# Patient Record
Sex: FEMALE | Race: WHITE | Employment: OTHER | URBAN - NONMETROPOLITAN AREA
[De-identification: names, ages, dates, MRNs, and addresses within clinical notes are randomized per-mention and may not be internally consistent; named-entity substitution may affect disease eponyms.]

---

## 2017-08-02 ENCOUNTER — HOSPITAL ENCOUNTER (OUTPATIENT)
Dept: CT IMAGING | Age: 67
Discharge: HOME | End: 2017-08-02
Payer: MEDICARE

## 2017-08-02 DIAGNOSIS — R10.32 LEFT LOWER QUADRANT PAIN: ICD-10-CM

## 2017-08-02 LAB
ALT SERPL-CCNC: 12 U/L (ref 11–66)
AMORPHOUS: ABNORMAL
ANION GAP SERPL CALCULATED.3IONS-SCNC: 11 MEQ/L (ref 8–16)
ANISOCYTOSIS: ABNORMAL
AST SERPL-CCNC: 12 U/L (ref 5–40)
BACTERIA: ABNORMAL
BASOPHILS # BLD: 0.7 %
BASOPHILS ABSOLUTE: 0 THOU/MM3 (ref 0–0.1)
BILIRUBIN URINE: NEGATIVE
BLOOD, URINE: NEGATIVE
BUN BLDV-MCNC: 15 MG/DL (ref 7–22)
CALCIUM SERPL-MCNC: 9.1 MG/DL (ref 8.5–10.5)
CASTS: ABNORMAL /LPF
CASTS: ABNORMAL /LPF
CHARACTER, URINE: ABNORMAL
CHLORIDE BLD-SCNC: 102 MEQ/L (ref 98–111)
CHOLESTEROL, TOTAL: 164 MG/DL (ref 100–199)
CO2: 28 MEQ/L (ref 23–33)
COLOR: ABNORMAL
CREAT SERPL-MCNC: 0.8 MG/DL (ref 0.4–1.2)
CRYSTALS: ABNORMAL
EOSINOPHIL # BLD: 2.5 %
EOSINOPHILS ABSOLUTE: 0.2 THOU/MM3 (ref 0–0.4)
EPITHELIAL CELLS, UA: ABNORMAL /HPF
GFR SERPL CREATININE-BSD FRML MDRD: 71 ML/MIN/1.73M2
GLUCOSE BLD-MCNC: 133 MG/DL (ref 70–108)
GLUCOSE, URINE: NEGATIVE MG/DL
HCT VFR BLD CALC: 40.1 % (ref 37–47)
HDLC SERPL-MCNC: 39 MG/DL
HEMOGLOBIN: 13.4 GM/DL (ref 12–16)
KETONES, URINE: NEGATIVE
LDL CHOLESTEROL CALCULATED: 78 MG/DL
LEUKOCYTE EST, POC: NEGATIVE
LYMPHOCYTES # BLD: 29.6 %
LYMPHOCYTES ABSOLUTE: 2 THOU/MM3 (ref 1–4.8)
MCH RBC QN AUTO: 27.8 PG (ref 27–31)
MCHC RBC AUTO-ENTMCNC: 33.3 GM/DL (ref 33–37)
MCV RBC AUTO: 83.4 FL (ref 81–99)
MISCELLANEOUS LAB TEST RESULT: ABNORMAL
MONOCYTES # BLD: 7.4 %
MONOCYTES ABSOLUTE: 0.5 THOU/MM3 (ref 0.4–1.3)
MUCUS: ABNORMAL
NITRITE, URINE: NEGATIVE
NUCLEATED RED BLOOD CELLS: 0 /100 WBC
PDW BLD-RTO: 15.1 % (ref 11.5–14.5)
PH UA: 5.5
PLATELET # BLD: 239 THOU/MM3 (ref 130–400)
PMV BLD AUTO: 8.7 MCM (ref 7.4–10.4)
POC CREATININE WHOLE BLOOD: 0.9 MG/DL (ref 0.5–1.2)
POTASSIUM SERPL-SCNC: 4.2 MEQ/L (ref 3.5–5.2)
PROTEIN UA: NEGATIVE MG/DL
RBC # BLD: 4.81 MILL/MM3 (ref 4.2–5.4)
RBC # BLD: NORMAL 10*6/UL
RBC URINE: ABNORMAL /HPF
RENAL EPITHELIAL, UA: ABNORMAL
SEG NEUTROPHILS: 59.8 %
SEGMENTED NEUTROPHILS ABSOLUTE COUNT: 4.1 THOU/MM3 (ref 1.8–7.7)
SODIUM BLD-SCNC: 141 MEQ/L (ref 135–145)
SPECIFIC GRAVITY UA: 1.02 (ref 1–1.03)
TRIGL SERPL-MCNC: 235 MG/DL (ref 0–199)
UROBILINOGEN, URINE: 0.2 EU/DL
WBC # BLD: 6.8 THOU/MM3 (ref 4.8–10.8)
WBC UA: ABNORMAL /HPF
YEAST: ABNORMAL

## 2017-08-02 PROCEDURE — 36415 COLL VENOUS BLD VENIPUNCTURE: CPT

## 2017-08-02 PROCEDURE — 6360000004 HC RX CONTRAST MEDICATION: Performed by: FAMILY MEDICINE

## 2017-08-02 PROCEDURE — 80061 LIPID PANEL: CPT

## 2017-08-02 PROCEDURE — 82565 ASSAY OF CREATININE: CPT

## 2017-08-02 PROCEDURE — 85025 COMPLETE CBC W/AUTO DIFF WBC: CPT

## 2017-08-02 PROCEDURE — 84450 TRANSFERASE (AST) (SGOT): CPT

## 2017-08-02 PROCEDURE — 74177 CT ABD & PELVIS W/CONTRAST: CPT

## 2017-08-02 PROCEDURE — 80048 BASIC METABOLIC PNL TOTAL CA: CPT

## 2017-08-02 PROCEDURE — 84460 ALANINE AMINO (ALT) (SGPT): CPT

## 2017-08-02 PROCEDURE — 81001 URINALYSIS AUTO W/SCOPE: CPT

## 2017-08-07 ENCOUNTER — HOSPITAL ENCOUNTER (OUTPATIENT)
Dept: GENERAL RADIOLOGY | Age: 67
Discharge: HOME | End: 2017-08-07
Payer: MEDICARE

## 2017-08-07 ENCOUNTER — HOSPITAL ENCOUNTER (OUTPATIENT)
Age: 67
Discharge: HOME | End: 2017-08-07
Payer: MEDICARE

## 2017-08-07 DIAGNOSIS — R06.02 SOB (SHORTNESS OF BREATH): ICD-10-CM

## 2017-08-07 PROCEDURE — 71020 XR CHEST STANDARD TWO VW: CPT

## 2017-08-08 ENCOUNTER — TELEPHONE (OUTPATIENT)
Dept: CARDIOLOGY CLINIC | Age: 67
End: 2017-08-08

## 2017-08-10 ENCOUNTER — HOSPITAL ENCOUNTER (OUTPATIENT)
Dept: NON INVASIVE DIAGNOSTICS | Age: 67
Discharge: HOME | End: 2017-08-10
Payer: MEDICARE

## 2017-08-10 LAB
EKG ATRIAL RATE: 67 BPM
EKG P AXIS: 57 DEGREES
EKG P-R INTERVAL: 142 MS
EKG Q-T INTERVAL: 450 MS
EKG QRS DURATION: 142 MS
EKG QTC CALCULATION (BAZETT): 475 MS
EKG R AXIS: -20 DEGREES
EKG T AXIS: 23 DEGREES
EKG VENTRICULAR RATE: 67 BPM
LV EF: 60 %
LVEF MODALITY: NORMAL

## 2017-08-10 PROCEDURE — 93005 ELECTROCARDIOGRAM TRACING: CPT

## 2017-08-10 PROCEDURE — 93306 TTE W/DOPPLER COMPLETE: CPT

## 2017-08-18 ENCOUNTER — OFFICE VISIT (OUTPATIENT)
Dept: CARDIOLOGY CLINIC | Age: 67
End: 2017-08-18
Payer: MEDICARE

## 2017-08-18 VITALS
HEART RATE: 108 BPM | WEIGHT: 293 LBS | DIASTOLIC BLOOD PRESSURE: 90 MMHG | BODY MASS INDEX: 43.4 KG/M2 | HEIGHT: 69 IN | SYSTOLIC BLOOD PRESSURE: 156 MMHG

## 2017-08-18 DIAGNOSIS — I10 ESSENTIAL HYPERTENSION: ICD-10-CM

## 2017-08-18 DIAGNOSIS — R10.9 FLANK PAIN: ICD-10-CM

## 2017-08-18 DIAGNOSIS — E78.01 FAMILIAL HYPERCHOLESTEROLEMIA: ICD-10-CM

## 2017-08-18 DIAGNOSIS — R06.09 DOE (DYSPNEA ON EXERTION): Primary | ICD-10-CM

## 2017-08-18 PROCEDURE — 93000 ELECTROCARDIOGRAM COMPLETE: CPT | Performed by: NUCLEAR MEDICINE

## 2017-08-18 PROCEDURE — 99204 OFFICE O/P NEW MOD 45 MIN: CPT | Performed by: NUCLEAR MEDICINE

## 2017-08-18 ASSESSMENT — ENCOUNTER SYMPTOMS
CHEST TIGHTNESS: 0
BLOOD IN STOOL: 0
DIARRHEA: 0
ANAL BLEEDING: 0
ABDOMINAL DISTENTION: 0
PHOTOPHOBIA: 0
ABDOMINAL PAIN: 0
BACK PAIN: 0
CONSTIPATION: 0
SHORTNESS OF BREATH: 1
RECTAL PAIN: 0
VOMITING: 0
NAUSEA: 0
COLOR CHANGE: 0

## 2017-09-08 ENCOUNTER — HOSPITAL ENCOUNTER (OUTPATIENT)
Dept: PULMONOLOGY | Age: 67
Discharge: HOME | End: 2017-09-08
Payer: MEDICARE

## 2017-09-08 PROCEDURE — 94060 EVALUATION OF WHEEZING: CPT

## 2017-09-08 PROCEDURE — 94729 DIFFUSING CAPACITY: CPT

## 2017-09-08 PROCEDURE — 94726 PLETHYSMOGRAPHY LUNG VOLUMES: CPT

## 2017-12-08 ENCOUNTER — TELEPHONE (OUTPATIENT)
Dept: CARDIOLOGY CLINIC | Age: 67
End: 2017-12-08

## 2017-12-20 ENCOUNTER — OFFICE VISIT (OUTPATIENT)
Dept: CARDIOLOGY CLINIC | Age: 67
End: 2017-12-20
Payer: MEDICARE

## 2017-12-20 ENCOUNTER — HOSPITAL ENCOUNTER (OUTPATIENT)
Dept: NON INVASIVE DIAGNOSTICS | Age: 67
Discharge: HOME | End: 2017-12-20
Payer: MEDICARE

## 2017-12-20 VITALS
DIASTOLIC BLOOD PRESSURE: 67 MMHG | HEART RATE: 103 BPM | BODY MASS INDEX: 44.41 KG/M2 | HEIGHT: 68 IN | SYSTOLIC BLOOD PRESSURE: 124 MMHG | WEIGHT: 293 LBS

## 2017-12-20 DIAGNOSIS — I10 ESSENTIAL HYPERTENSION: ICD-10-CM

## 2017-12-20 DIAGNOSIS — R42 DIZZINESS: ICD-10-CM

## 2017-12-20 DIAGNOSIS — R06.09 DOE (DYSPNEA ON EXERTION): Primary | ICD-10-CM

## 2017-12-20 DIAGNOSIS — E78.5 HYPERLIPIDEMIA, UNSPECIFIED HYPERLIPIDEMIA TYPE: ICD-10-CM

## 2017-12-20 DIAGNOSIS — R06.09 DOE (DYSPNEA ON EXERTION): ICD-10-CM

## 2017-12-20 PROCEDURE — 99214 OFFICE O/P EST MOD 30 MIN: CPT | Performed by: NUCLEAR MEDICINE

## 2017-12-20 PROCEDURE — 93308 TTE F-UP OR LMTD: CPT

## 2017-12-20 PROCEDURE — 93000 ELECTROCARDIOGRAM COMPLETE: CPT | Performed by: NUCLEAR MEDICINE

## 2017-12-20 RX ORDER — NIACIN 250 MG
250 TABLET, EXTENDED RELEASE ORAL NIGHTLY
COMMUNITY
End: 2018-06-01 | Stop reason: ALTCHOICE

## 2017-12-20 NOTE — PROGRESS NOTES
[facility]  [facility]  [address]  [address]  [facility] [place] [address]  Dept: [phone]  Dept Fax: [phone]  Loc: [phone]    Visit Date: 12/20/2017    Clearance Doing is a 79 y.o. female who presents today for:  Chief Complaint   Patient presents with    Check-Up     dizziness    Dizziness    Hypertension    Obesity     Here due to dizziness  Seems orthostatic   Had orthostatic hypotension at PCP  Drop BP by 50 points  Ended up with compression hose  No difference  Mainly orthostatic  No chest pain  Some dyspnea  No syncope  Cath 2014  Known small pericardiac effusion that we are following   Obesity issues     HPI:  HPI  Past Medical History:   Diagnosis Date    Cancer ([address]     skin    SCOTT (dyspnea on exertion)     false positive stress test 2014 with normal cardiac cath 2014.     GERD (gastroesophageal reflux disease)     Headache     Hyperglycemia 2015    Hyperlipidemia     Migraine headache     Morbid obesity (HCC)     Osteopenia     RLS (restless legs syndrome)     UTI (urinary tract infection)     history of      Past Surgical History:   Procedure Laterality Date   Laci Area  3181'Y    CARPAL TUNNEL RELEASE Bilateral 1990 2008 1991 2016     x2 right x2 left    HIP ARTHROPLASTY Left 02/2015    HYSTERECTOMY  1980    KNEE ARTHROPLASTY Left 2007    KNEE ARTHROPLASTY Right 2014     Family History   Problem Relation Age of Onset    Arthritis Mother     High Blood Pressure Mother     Cancer Sister      ovarian    Cancer Maternal Grandmother      breast    Arthritis Maternal Grandmother     Cancer Maternal Grandfather      colon    Arthritis Maternal Grandfather      Social History   Substance Use Topics    Smoking status: Never Smoker    Smokeless tobacco: Never Used    Alcohol use No      Current Outpatient Prescriptions   Medication Sig Dispense Refill    niacin (SLO-NIACIN) 250 MG extended release tablet Take 250 mg by mouth nightly  verapamil (CALAN SR) 180 MG extended release tablet Take 180 mg by mouth daily      metFORMIN (GLUCOPHAGE) 500 MG tablet Take 500 mg by mouth daily (with breakfast)      DULoxetine (CYMBALTA) 30 MG extended release capsule Take 30 mg by mouth daily      SUMAtriptan (IMITREX) 50 MG tablet Take 50 mg by mouth once as needed for Migraine      naproxen (NAPROSYN) 500 MG tablet Take 500 mg by mouth as needed for Pain      alendronate (FOSAMAX) 35 MG tablet Take 35 mg by mouth every 7 days       atorvastatin (LIPITOR) 10 MG tablet Take 10 mg by mouth daily.  rOPINIRole (REQUIP) 0.5 MG tablet Take 0.5 mg by mouth nightly.  omeprazole (PRILOSEC) 20 MG capsule Take 20 mg by mouth daily. No current facility-administered medications for this visit. No Known Allergies  Health Maintenance   Topic Date Due    Hepatitis C screen  1950    DTaP/Tdap/Td vaccine (1 - Tdap) 03/25/1969    Colon cancer screen colonoscopy  03/25/2000    Zostavax vaccine  03/25/2010    DEXA (modify frequency per FRAX score)  03/25/2015    Pneumococcal low/med risk (1 of 2 - PCV13) 03/25/2015    Flu vaccine (1) 09/01/2017    Diabetes screen  09/11/2018    Breast cancer screen  01/09/2019    Lipid screen  08/02/2022       Subjective:  Review of Systems  General:   No fever, no chills, No fatigue or weight loss  Pulmonary:    some dyspnea, no wheezing  Cardiac:    Denies recent chest pain,   GI:     No nausea or vomiting, no abdominal pain  Neuro:     some dizziness or light headedness,   Musculoskeletal:  No recent active issues  Extremities:   No edema, good peripheral pulses      Objective:  Physical Exam  /67 (Position: Standing)   Pulse 103   Ht 5' 7.5\" (1.715 m)   Wt 297 lb (134.7 kg)   BMI 45.83 kg/m²   General:   Well developed, well nourished  Lungs:    Clear to auscultation  Heart:    Normal S1 S2, Slight murmur.  no rubs, no gallops  Abdomen:   Soft, non tender, no organomegalies, positive

## 2017-12-20 NOTE — PROGRESS NOTES
Pt here for check up   States she has had issues with dizziness, more when going from laying to standing   Had positive orthostatic BP at PCP office   Also has been having some SOB more on exertion    Denies chest pain

## 2018-01-29 ENCOUNTER — HOSPITAL ENCOUNTER (OUTPATIENT)
Dept: WOMENS IMAGING | Age: 68
Discharge: HOME | End: 2018-01-29
Payer: MEDICARE

## 2018-01-29 DIAGNOSIS — Z12.31 VISIT FOR SCREENING MAMMOGRAM: ICD-10-CM

## 2018-01-29 PROCEDURE — 77067 SCR MAMMO BI INCL CAD: CPT

## 2018-01-29 PROCEDURE — G0202 SCR MAMMO BI INCL CAD: HCPCS

## 2018-04-18 ENCOUNTER — OFFICE VISIT (OUTPATIENT)
Dept: CARDIOLOGY CLINIC | Age: 68
End: 2018-04-18
Payer: MEDICARE

## 2018-04-18 VITALS
DIASTOLIC BLOOD PRESSURE: 65 MMHG | WEIGHT: 293 LBS | BODY MASS INDEX: 44.41 KG/M2 | HEART RATE: 98 BPM | SYSTOLIC BLOOD PRESSURE: 103 MMHG | HEIGHT: 68 IN

## 2018-04-18 DIAGNOSIS — R06.09 DOE (DYSPNEA ON EXERTION): Primary | ICD-10-CM

## 2018-04-18 DIAGNOSIS — E78.5 HYPERLIPIDEMIA, UNSPECIFIED HYPERLIPIDEMIA TYPE: ICD-10-CM

## 2018-04-18 DIAGNOSIS — I25.10 CORONARY ARTERY DISEASE INVOLVING NATIVE CORONARY ARTERY OF NATIVE HEART WITHOUT ANGINA PECTORIS: ICD-10-CM

## 2018-04-18 DIAGNOSIS — E78.01 FAMILIAL HYPERCHOLESTEROLEMIA: ICD-10-CM

## 2018-04-18 PROCEDURE — 99214 OFFICE O/P EST MOD 30 MIN: CPT | Performed by: NUCLEAR MEDICINE

## 2018-04-18 PROCEDURE — 93000 ELECTROCARDIOGRAM COMPLETE: CPT | Performed by: NUCLEAR MEDICINE

## 2018-04-18 RX ORDER — ATENOLOL 25 MG/1
12.5 TABLET ORAL DAILY
Qty: 45 TABLET | Refills: 3 | Status: SHIPPED | OUTPATIENT
Start: 2018-04-18 | End: 2019-04-26 | Stop reason: SDUPTHER

## 2018-04-18 RX ORDER — ATENOLOL 25 MG/1
12.5 TABLET ORAL DAILY
COMMUNITY
End: 2018-04-18 | Stop reason: SDUPTHER

## 2018-04-23 ENCOUNTER — HOSPITAL ENCOUNTER (OUTPATIENT)
Age: 68
Discharge: HOME | End: 2018-04-23
Payer: MEDICARE

## 2018-04-23 LAB
AVERAGE GLUCOSE: 117 MG/DL (ref 70–126)
BUN BLDV-MCNC: 13 MG/DL (ref 7–22)
CREAT SERPL-MCNC: 0.8 MG/DL (ref 0.4–1.2)
GFR SERPL CREATININE-BSD FRML MDRD: 71 ML/MIN/1.73M2
HBA1C MFR BLD: 5.9 % (ref 4.4–6.4)

## 2018-04-23 PROCEDURE — 83036 HEMOGLOBIN GLYCOSYLATED A1C: CPT

## 2018-04-23 PROCEDURE — 82565 ASSAY OF CREATININE: CPT

## 2018-04-23 PROCEDURE — 84520 ASSAY OF UREA NITROGEN: CPT

## 2018-04-23 PROCEDURE — 36415 COLL VENOUS BLD VENIPUNCTURE: CPT

## 2018-05-22 ENCOUNTER — HOSPITAL ENCOUNTER (OUTPATIENT)
Dept: NON INVASIVE DIAGNOSTICS | Age: 68
Discharge: HOME | End: 2018-05-22
Payer: MEDICARE

## 2018-05-22 ENCOUNTER — TELEPHONE (OUTPATIENT)
Dept: CARDIOLOGY CLINIC | Age: 68
End: 2018-05-22

## 2018-05-22 VITALS — BODY MASS INDEX: 43.95 KG/M2 | HEIGHT: 68 IN | WEIGHT: 290 LBS

## 2018-05-22 DIAGNOSIS — R06.09 DOE (DYSPNEA ON EXERTION): ICD-10-CM

## 2018-05-22 DIAGNOSIS — E78.5 HYPERLIPIDEMIA, UNSPECIFIED HYPERLIPIDEMIA TYPE: ICD-10-CM

## 2018-05-22 PROCEDURE — 93660 TILT TABLE EVALUATION: CPT | Performed by: NUCLEAR MEDICINE

## 2018-06-01 ENCOUNTER — OFFICE VISIT (OUTPATIENT)
Dept: CARDIOLOGY CLINIC | Age: 68
End: 2018-06-01
Payer: MEDICARE

## 2018-06-01 VITALS
DIASTOLIC BLOOD PRESSURE: 60 MMHG | HEART RATE: 80 BPM | BODY MASS INDEX: 44.41 KG/M2 | HEIGHT: 68 IN | SYSTOLIC BLOOD PRESSURE: 132 MMHG | WEIGHT: 293 LBS

## 2018-06-01 DIAGNOSIS — I25.10 CORONARY ARTERY DISEASE INVOLVING NATIVE CORONARY ARTERY OF NATIVE HEART WITHOUT ANGINA PECTORIS: ICD-10-CM

## 2018-06-01 DIAGNOSIS — I10 ESSENTIAL HYPERTENSION: ICD-10-CM

## 2018-06-01 DIAGNOSIS — E78.01 FAMILIAL HYPERCHOLESTEROLEMIA: ICD-10-CM

## 2018-06-01 DIAGNOSIS — R42 DIZZINESS: Primary | ICD-10-CM

## 2018-06-01 PROCEDURE — 99213 OFFICE O/P EST LOW 20 MIN: CPT | Performed by: NUCLEAR MEDICINE

## 2018-07-19 ENCOUNTER — HOSPITAL ENCOUNTER (OUTPATIENT)
Age: 68
Discharge: HOME | End: 2018-07-19
Payer: MEDICARE

## 2018-07-19 LAB
AVERAGE GLUCOSE: 123 MG/DL (ref 70–126)
HBA1C MFR BLD: 6.1 % (ref 4.4–6.4)

## 2018-07-19 PROCEDURE — 36415 COLL VENOUS BLD VENIPUNCTURE: CPT

## 2018-07-19 PROCEDURE — 83036 HEMOGLOBIN GLYCOSYLATED A1C: CPT

## 2018-08-30 ENCOUNTER — HOSPITAL ENCOUNTER (OUTPATIENT)
Age: 68
Discharge: HOME | End: 2018-08-30
Payer: MEDICARE

## 2018-08-30 LAB
ALBUMIN SERPL-MCNC: 3.9 G/DL (ref 3.5–5.1)
ALP BLD-CCNC: 75 U/L (ref 38–126)
ALT SERPL-CCNC: 8 U/L (ref 11–66)
ANION GAP SERPL CALCULATED.3IONS-SCNC: 12 MEQ/L (ref 8–16)
AST SERPL-CCNC: 11 U/L (ref 5–40)
BASOPHILS # BLD: 0.4 %
BASOPHILS ABSOLUTE: 0 THOU/MM3 (ref 0–0.1)
BILIRUB SERPL-MCNC: 0.6 MG/DL (ref 0.3–1.2)
BUN BLDV-MCNC: 13 MG/DL (ref 7–22)
CALCIUM SERPL-MCNC: 9 MG/DL (ref 8.5–10.5)
CHLORIDE BLD-SCNC: 103 MEQ/L (ref 98–111)
CHOLESTEROL, TOTAL: 174 MG/DL (ref 100–199)
CO2: 23 MEQ/L (ref 23–33)
CREAT SERPL-MCNC: 0.8 MG/DL (ref 0.4–1.2)
EOSINOPHIL # BLD: 2.1 %
EOSINOPHILS ABSOLUTE: 0.1 THOU/MM3 (ref 0–0.4)
ERYTHROCYTE [DISTWIDTH] IN BLOOD BY AUTOMATED COUNT: 13.8 % (ref 11.5–14.5)
ERYTHROCYTE [DISTWIDTH] IN BLOOD BY AUTOMATED COUNT: 42.6 FL (ref 35–45)
GFR SERPL CREATININE-BSD FRML MDRD: 71 ML/MIN/1.73M2
GLUCOSE BLD-MCNC: 151 MG/DL (ref 70–108)
HCT VFR BLD CALC: 40.9 % (ref 37–47)
HDLC SERPL-MCNC: 40 MG/DL
HEMOGLOBIN: 13.6 GM/DL (ref 12–16)
IMMATURE GRANS (ABS): 0.01 THOU/MM3 (ref 0–0.07)
IMMATURE GRANULOCYTES: 0.2 %
LDL CHOLESTEROL CALCULATED: 97 MG/DL
LYMPHOCYTES # BLD: 27.5 %
LYMPHOCYTES ABSOLUTE: 1.6 THOU/MM3 (ref 1–4.8)
MCH RBC QN AUTO: 28.2 PG (ref 26–33)
MCHC RBC AUTO-ENTMCNC: 33.3 GM/DL (ref 32.2–35.5)
MCV RBC AUTO: 84.9 FL (ref 81–99)
MONOCYTES # BLD: 7.6 %
MONOCYTES ABSOLUTE: 0.4 THOU/MM3 (ref 0.4–1.3)
NUCLEATED RED BLOOD CELLS: 0 /100 WBC
PLATELET # BLD: 252 THOU/MM3 (ref 130–400)
PMV BLD AUTO: 9.8 FL (ref 9.4–12.4)
POTASSIUM SERPL-SCNC: 4.3 MEQ/L (ref 3.5–5.2)
RBC # BLD: 4.82 MILL/MM3 (ref 4.2–5.4)
SEG NEUTROPHILS: 62.2 %
SEGMENTED NEUTROPHILS ABSOLUTE COUNT: 3.5 THOU/MM3 (ref 1.8–7.7)
SODIUM BLD-SCNC: 138 MEQ/L (ref 135–145)
T4 FREE: 0.93 NG/DL (ref 0.93–1.76)
TOTAL PROTEIN: 6.9 G/DL (ref 6.1–8)
TRIGL SERPL-MCNC: 183 MG/DL (ref 0–199)
TSH SERPL DL<=0.05 MIU/L-ACNC: 1.84 UIU/ML (ref 0.4–4.2)
WBC # BLD: 5.7 THOU/MM3 (ref 4.8–10.8)

## 2018-08-30 PROCEDURE — 36415 COLL VENOUS BLD VENIPUNCTURE: CPT

## 2018-08-30 PROCEDURE — 84439 ASSAY OF FREE THYROXINE: CPT

## 2018-08-30 PROCEDURE — 85025 COMPLETE CBC W/AUTO DIFF WBC: CPT

## 2018-08-30 PROCEDURE — 80061 LIPID PANEL: CPT

## 2018-08-30 PROCEDURE — 80053 COMPREHEN METABOLIC PANEL: CPT

## 2018-08-30 PROCEDURE — 84443 ASSAY THYROID STIM HORMONE: CPT

## 2018-09-24 ENCOUNTER — OFFICE VISIT (OUTPATIENT)
Dept: SURGERY | Age: 68
End: 2018-09-24
Payer: MEDICARE

## 2018-09-24 VITALS
DIASTOLIC BLOOD PRESSURE: 62 MMHG | HEIGHT: 68 IN | HEART RATE: 80 BPM | TEMPERATURE: 97 F | RESPIRATION RATE: 18 BRPM | SYSTOLIC BLOOD PRESSURE: 112 MMHG | OXYGEN SATURATION: 97 % | BODY MASS INDEX: 44.41 KG/M2 | WEIGHT: 293 LBS

## 2018-09-24 DIAGNOSIS — K44.9 HIATAL HERNIA: Primary | ICD-10-CM

## 2018-09-24 DIAGNOSIS — K21.9 GASTROESOPHAGEAL REFLUX DISEASE, ESOPHAGITIS PRESENCE NOT SPECIFIED: ICD-10-CM

## 2018-09-24 PROCEDURE — 99204 OFFICE O/P NEW MOD 45 MIN: CPT | Performed by: SURGERY

## 2018-09-24 NOTE — PROGRESS NOTES
She had a colonoscopy by Dr. Pastor Cardona in 2017. She states she has never had an EGD. CT scan of the abdomen and pelvis performed at [facility] which revealed a moderate sized hiatal hernia with half of her stomach in an intrathoracic location. She denies any recent change in weight. She does complain of chronic fatigue lower back pain and vaginal pain chronic reflux symptoms, malaise, nausea and early satiety. She denies urinary or fecal incontinence issues. She denies any prolapse pelvic contents. Past Medical History  Past Medical History:   Diagnosis Date    Cancer ([address]     skin    SCOTT (dyspnea on exertion)     false positive stress test 2014 with normal cardiac cath 2014.     GERD (gastroesophageal reflux disease)     Headache     Migraines    Hyperglycemia 2015    borderline takes Metformin    Hyperlipidemia     Migraine headache     Morbid obesity (HCC)     Osteopenia     RLS (restless legs syndrome)     UTI (urinary tract infection)     history of       Past Surgical History  Past Surgical History:   Procedure Laterality Date    BUNIONECTOMY Right 1980's    CARPAL TUNNEL RELEASE Bilateral 1990 2008 1991 2016     x2 right x2 left    [facility]    HIP ARTHROPLASTY Left 02/2015    HYSTERECTOMY  1980    JOINT REPLACEMENT Right     KNEE ARTHROPLASTY Left 2007    KNEE ARTHROPLASTY Right 2014       Medications  Current Outpatient Prescriptions   Medication Sig Dispense Refill    atenolol (TENORMIN) 25 MG tablet Take 0.5 tablets by mouth daily 45 tablet 3    metFORMIN (GLUCOPHAGE) 500 MG tablet Take 500 mg by mouth daily (with breakfast)      DULoxetine (CYMBALTA) 30 MG extended release capsule Take 30 mg by mouth daily      SUMAtriptan (IMITREX) 50 MG tablet Take 50 mg by mouth once as needed for Migraine      naproxen (NAPROSYN) 500 MG tablet Take 500 mg by mouth as needed for Pain      alendronate (FOSAMAX) 35 MG tablet Take 35 mg by

## 2018-09-25 ASSESSMENT — ENCOUNTER SYMPTOMS
SORE THROAT: 0
VOMITING: 0
NAUSEA: 0
TROUBLE SWALLOWING: 0
ABDOMINAL PAIN: 0
VOICE CHANGE: 0
SHORTNESS OF BREATH: 0
BACK PAIN: 1
WHEEZING: 0
COUGH: 0
BLOOD IN STOOL: 0
COLOR CHANGE: 0

## 2018-10-16 ENCOUNTER — HOSPITAL ENCOUNTER (OUTPATIENT)
Age: 68
Setting detail: OUTPATIENT SURGERY
Discharge: HOME | End: 2018-10-16
Attending: INTERNAL MEDICINE | Admitting: INTERNAL MEDICINE
Payer: MEDICARE

## 2018-10-16 VITALS
SYSTOLIC BLOOD PRESSURE: 132 MMHG | RESPIRATION RATE: 18 BRPM | WEIGHT: 293 LBS | HEIGHT: 68 IN | OXYGEN SATURATION: 97 % | BODY MASS INDEX: 44.41 KG/M2 | HEART RATE: 94 BPM | DIASTOLIC BLOOD PRESSURE: 97 MMHG

## 2018-10-16 ASSESSMENT — PAIN - FUNCTIONAL ASSESSMENT: PAIN_FUNCTIONAL_ASSESSMENT: 0-10

## 2018-10-30 ENCOUNTER — HOSPITAL ENCOUNTER (OUTPATIENT)
Dept: GENERAL RADIOLOGY | Age: 68
Discharge: HOME | End: 2018-10-30
Payer: MEDICARE

## 2018-10-30 DIAGNOSIS — K21.9 GASTROESOPHAGEAL REFLUX DISEASE, ESOPHAGITIS PRESENCE NOT SPECIFIED: ICD-10-CM

## 2018-10-30 PROCEDURE — A4641 RADIOPHARM DX AGENT NOC: HCPCS | Performed by: SURGERY

## 2018-10-30 PROCEDURE — 74240 X-RAY XM UPR GI TRC 1CNTRST: CPT

## 2018-10-30 PROCEDURE — 6370000000 HC RX 637 (ALT 250 FOR IP): Performed by: SURGERY

## 2018-10-30 PROCEDURE — 74220 X-RAY XM ESOPHAGUS 1CNTRST: CPT

## 2018-10-30 PROCEDURE — 6360000004 HC RX CONTRAST MEDICATION: Performed by: SURGERY

## 2018-10-30 PROCEDURE — 2500000003 HC RX 250 WO HCPCS: Performed by: SURGERY

## 2018-10-30 RX ADMIN — ANTACID/ANTIFLATULENT 1 EACH: 380; 550; 10; 10 GRANULE, EFFERVESCENT ORAL at 08:55

## 2018-10-30 RX ADMIN — BARIUM SULFATE 140 ML: 0.6 SUSPENSION ORAL at 08:55

## 2018-10-30 RX ADMIN — BARIUM SULFATE 140 ML: 980 POWDER, FOR SUSPENSION ORAL at 08:55

## 2018-11-13 ENCOUNTER — ANESTHESIA (OUTPATIENT)
Dept: ENDOSCOPY | Age: 68
End: 2018-11-13
Payer: MEDICARE

## 2018-11-13 ENCOUNTER — HOSPITAL ENCOUNTER (OUTPATIENT)
Age: 68
Setting detail: OUTPATIENT SURGERY
Discharge: HOME | End: 2018-11-13
Attending: INTERNAL MEDICINE | Admitting: INTERNAL MEDICINE
Payer: MEDICARE

## 2018-11-13 ENCOUNTER — ANESTHESIA EVENT (OUTPATIENT)
Dept: ENDOSCOPY | Age: 68
End: 2018-11-13
Payer: MEDICARE

## 2018-11-13 VITALS
WEIGHT: 293 LBS | HEART RATE: 78 BPM | DIASTOLIC BLOOD PRESSURE: 72 MMHG | TEMPERATURE: 97.9 F | RESPIRATION RATE: 18 BRPM | OXYGEN SATURATION: 98 % | HEIGHT: 68 IN | BODY MASS INDEX: 44.41 KG/M2 | SYSTOLIC BLOOD PRESSURE: 148 MMHG

## 2018-11-13 VITALS
OXYGEN SATURATION: 89 % | DIASTOLIC BLOOD PRESSURE: 88 MMHG | RESPIRATION RATE: 10 BRPM | SYSTOLIC BLOOD PRESSURE: 160 MMHG

## 2018-11-13 PROCEDURE — 2580000003 HC RX 258: Performed by: INTERNAL MEDICINE

## 2018-11-13 PROCEDURE — 3700000001 HC ADD 15 MINUTES (ANESTHESIA): Performed by: INTERNAL MEDICINE

## 2018-11-13 PROCEDURE — 6360000002 HC RX W HCPCS: Performed by: NURSE ANESTHETIST, CERTIFIED REGISTERED

## 2018-11-13 PROCEDURE — 7100000000 HC PACU RECOVERY - FIRST 15 MIN: Performed by: INTERNAL MEDICINE

## 2018-11-13 PROCEDURE — 3700000000 HC ANESTHESIA ATTENDED CARE: Performed by: INTERNAL MEDICINE

## 2018-11-13 PROCEDURE — 3609012800 HC EGD DIAGNOSTIC ONLY: Performed by: INTERNAL MEDICINE

## 2018-11-13 PROCEDURE — 2500000003 HC RX 250 WO HCPCS: Performed by: NURSE ANESTHETIST, CERTIFIED REGISTERED

## 2018-11-13 RX ORDER — SODIUM CHLORIDE 450 MG/100ML
INJECTION, SOLUTION INTRAVENOUS CONTINUOUS
Status: DISCONTINUED | OUTPATIENT
Start: 2018-11-13 | End: 2018-11-13 | Stop reason: HOSPADM

## 2018-11-13 RX ORDER — LIDOCAINE HYDROCHLORIDE 20 MG/ML
INJECTION, SOLUTION EPIDURAL; INFILTRATION; INTRACAUDAL; PERINEURAL PRN
Status: DISCONTINUED | OUTPATIENT
Start: 2018-11-13 | End: 2018-11-13 | Stop reason: SDUPTHER

## 2018-11-13 RX ORDER — PROPOFOL 10 MG/ML
INJECTION, EMULSION INTRAVENOUS PRN
Status: DISCONTINUED | OUTPATIENT
Start: 2018-11-13 | End: 2018-11-13 | Stop reason: SDUPTHER

## 2018-11-13 RX ORDER — PANTOPRAZOLE SODIUM 40 MG/1
40 TABLET, DELAYED RELEASE ORAL DAILY
Qty: 30 TABLET | Refills: 3 | Status: SHIPPED | OUTPATIENT
Start: 2018-11-13 | End: 2019-10-20

## 2018-11-13 RX ADMIN — PROPOFOL 50 MG: 10 INJECTION, EMULSION INTRAVENOUS at 09:42

## 2018-11-13 RX ADMIN — LIDOCAINE HYDROCHLORIDE 100 MG: 20 INJECTION, SOLUTION EPIDURAL; INFILTRATION; INTRACAUDAL; PERINEURAL at 09:40

## 2018-11-13 RX ADMIN — SODIUM CHLORIDE: 4.5 INJECTION, SOLUTION INTRAVENOUS at 08:19

## 2018-11-13 RX ADMIN — PROPOFOL 50 MG: 10 INJECTION, EMULSION INTRAVENOUS at 09:40

## 2018-11-13 ASSESSMENT — ENCOUNTER SYMPTOMS: SHORTNESS OF BREATH: 1

## 2018-11-13 ASSESSMENT — PAIN - FUNCTIONAL ASSESSMENT: PAIN_FUNCTIONAL_ASSESSMENT: 0-10

## 2018-11-13 NOTE — OP NOTE
[facility]  EGD Procedure Note    Patient: Jaswinder España  : 1950      Procedure: Esophagogastroduodenoscopy          Date:  2018     Endoscopist:   Danielle Salas MD    Referring Physician:  TIMOTHY Foote CNP, MD    Indications: This is a 76y.o. year old female who presents with dysphagia, GERD. Anesthesia: MAC per Anesthesia. Please see anesthesia report. Consent:  The patient or their legal guardian has signed a consent, and is aware of the potential risks, benefits, alternatives, and potential complications of this procedure. These include, but are not limited to hemorrhage, bleeding, post procedural pain, perforation, phlebitis, aspiration, hypotension, hypoxia, cardiovascular events such as arrhythmia, and possibly death. Description of Procedure: The patient was then taken to the endoscopy suite, placed in the left lateral decubitus position and the above IV sedation was administered. The Olympus video endoscope was placed through the patient's oropharynx without difficulty to the extent of the 2nd portion of the duodenum. Both forward and retroflexed views of the stomach were obtained. Findings:  Esophagus: The GE junction was noted to be at 35cm. The esophagus appeared normal without evidence of Lopez's esophagus or reflux esophagitis. However a 4cm hiatal hernia was noted. Stomach: The stomach appeared normal on forward and retroflexed views excepted for localized erythema in the antrum  Duodenum: The first and 2nd portions of the duodenum appeared normal with normal villous pattern    The scope was then withdrawn back into the stomach, it was decompressed, and the scope was completely withdrawn.       Recommendations:   - Follow up with primary care physician as previously scheduled  - Follow up with Xiomara Ambrocio CNP as previously scheduled  - Will start patient on pantoprazole 40mg daily, 1/2 hour before heaviest meal on an

## 2018-11-13 NOTE — PROGRESS NOTES
Discharge instructions provided, understanding verbalized. Discharged via wheelchair to private vehicle.

## 2018-11-13 NOTE — H&P
[facility]   Pre-Operative History and Physical: EGD    Patient: Griselda Ordoñez  : 1950     History Obtained From:  patient, electronic medical record    HISTORY OF PRESENT ILLNESS:    The patient is a 76 y.o. female who presents for an EGD for dysphagia and GERD     Past Medical History:        Diagnosis Date    [address] [address] [facility]     skin    SCOTT (dyspnea on exertion)     false positive stress test  with normal cardiac cath .  GERD (gastroesophageal reflux disease)     Headache     Migraines    Hyperglycemia 2015    borderline takes Metformin    Hyperlipidemia     Migraine headache     Morbid obesity (HCC)     Osteopenia     RLS (restless legs syndrome)     UTI (urinary tract infection)     history of     Past Surgical History:        Procedure Laterality Date    BUNIONECTOMY Right     CARPAL TUNNEL RELEASE Bilateral 1990 Obdulia Quarry 2016     x2 right x2 left    [facility]    HIP ARTHROPLASTY Left 2015    HYSTERECTOMY  1980    JOINT REPLACEMENT Right     KNEE ARTHROPLASTY Left 2007    KNEE ARTHROPLASTY Right 2014    UPPER GASTROINTESTINAL ENDOSCOPY         Medications Prior to Admission:   No current facility-administered medications on file prior to encounter. Current Outpatient Prescriptions on File Prior to Encounter   Medication Sig Dispense Refill    atenolol (TENORMIN) 25 MG tablet Take 0.5 tablets by mouth daily 45 tablet 3    metFORMIN (GLUCOPHAGE) 500 MG tablet Take 500 mg by mouth daily (with breakfast)      DULoxetine (CYMBALTA) 30 MG extended release capsule Take 30 mg by mouth daily      SUMAtriptan (IMITREX) 50 MG tablet Take 50 mg by mouth once as needed for Migraine      naproxen (NAPROSYN) 500 MG tablet Take 500 mg by mouth as needed for Pain      alendronate (FOSAMAX) 35 MG tablet Take 35 mg by mouth every 7 days       atorvastatin (LIPITOR) 10 MG tablet Take 10 mg by mouth daily.      

## 2018-11-26 ENCOUNTER — OFFICE VISIT (OUTPATIENT)
Dept: SURGERY | Age: 68
End: 2018-11-26
Payer: MEDICARE

## 2018-11-26 VITALS
HEIGHT: 68 IN | TEMPERATURE: 97.8 F | HEART RATE: 80 BPM | SYSTOLIC BLOOD PRESSURE: 138 MMHG | BODY MASS INDEX: 44.41 KG/M2 | WEIGHT: 293 LBS | DIASTOLIC BLOOD PRESSURE: 78 MMHG | RESPIRATION RATE: 18 BRPM

## 2018-11-26 DIAGNOSIS — Z01.818 PRE-OP TESTING: ICD-10-CM

## 2018-11-26 DIAGNOSIS — E66.01 MORBID OBESITY (HCC): ICD-10-CM

## 2018-11-26 DIAGNOSIS — K44.9 HIATAL HERNIA: Primary | ICD-10-CM

## 2018-11-26 DIAGNOSIS — K21.9 GASTROESOPHAGEAL REFLUX DISEASE, ESOPHAGITIS PRESENCE NOT SPECIFIED: ICD-10-CM

## 2018-11-26 PROCEDURE — 99215 OFFICE O/P EST HI 40 MIN: CPT | Performed by: SURGERY

## 2018-11-26 ASSESSMENT — ENCOUNTER SYMPTOMS
WHEEZING: 0
VOICE CHANGE: 0
VOMITING: 0
COUGH: 0
COLOR CHANGE: 0
BACK PAIN: 1
NAUSEA: 0
SHORTNESS OF BREATH: 0
BLOOD IN STOOL: 0
ABDOMINAL PAIN: 0
TROUBLE SWALLOWING: 0
SORE THROAT: 0

## 2018-11-26 NOTE — PROGRESS NOTES
She denies any vomiting any undigested food. She doesn't really describe any chest pressure. She states she has difficulty and is unable to sleep as well. She had a colonoscopy by Dr. Ilia Kaur in 2017. She states she has never had an EGD. CT scan of the abdomen and pelvis performed at [facility] which revealed a moderate sized hiatal hernia with half of her stomach in an intrathoracic location. She denies any recent change in weight. She does complain of chronic fatigue lower back pain and vaginal pain chronic reflux symptoms, malaise, nausea and early satiety. She denies urinary or fecal incontinence issues. She denies any prolapse pelvic contents. Interval history: Deion Roland was unable to have manometry her pH testing performed as they could not get the probe down. She did have an EGD as well as an esophagram and upper GI. She states she has yet to see gynecology. Medication was changed to Protonix and she continues to complain of reflux symptoms and chest discomfort. Swallowing was normal.  She had distention of the distal esophagus and a large hiatal hernia. There were some tertiary peristaltic contractions. Greater than 25% of the stomach was herniated into the chest on exam and did not reduce. There was no evidence of ulcer in the bulbar sweep was normal.  EGD was performed by Dr. Oscar Marshall. The GE junction was at 35 cm. The esophagus appeared normal there was a 4 cm hiatal hernia. There was no signs of Lopez's esophagus. No biopsies were taken. Patient complains of persistent symptoms. She did express a desire to consider a bariatric procedure. However after discussion she declined. She wishes to proceed with repair of her hiatal hernia and fundoplication for persistent symptoms. Past Medical History  Past Medical History:   Diagnosis Date    Cancer ([facility] [address] [facility]     skin    SCOTT (dyspnea on exertion)     false positive stress test 2014 with normal cardiac cath 2014.     GERD Maternal Grandmother         breast    Arthritis Maternal Grandmother     Cancer Maternal Grandfather         colon    Arthritis Maternal Grandfather        Social History  Social History     Social History    Marital status:      Spouse name: N/A    Number of children: N/A    Years of education: N/A     Occupational History    Not on file. Social History Main Topics    Smoking status: Never Smoker    Smokeless tobacco: Never Used    Alcohol use No    Drug use: No    Sexual activity: Not on file     Other Topics Concern    Not on file     Social History Narrative    No narrative on file           Review of Systems  Review of Systems   Constitutional: Positive for fatigue. Negative for chills, fever and unexpected weight change. HENT: Negative for sore throat, trouble swallowing and voice change. Eyes: Negative for visual disturbance. Respiratory: Negative for cough, shortness of breath and wheezing. Cardiovascular: Negative for chest pain and palpitations. Gastrointestinal: Negative for abdominal pain, blood in stool, nausea and vomiting. Endocrine: Negative for cold intolerance, heat intolerance and polydipsia. Genitourinary: Positive for vaginal pain. Negative for dysuria, flank pain and hematuria. Musculoskeletal: Positive for back pain. Negative for gait problem, joint swelling and myalgias. Skin: Negative for color change and rash. Allergic/Immunologic: Negative for immunocompromised state. Neurological: Negative for dizziness, tremors, seizures, speech difficulty, light-headedness and headaches. Hematological: Does not bruise/bleed easily. Psychiatric/Behavioral: Positive for sleep disturbance. Negative for behavioral problems, confusion and suicidal ideas.        OBJECTIVE     /78 (Site: Right Upper Arm, Position: Sitting, Cuff Size: Medium Adult)   Pulse 80   Temp 97.8 °F (36.6 °C) (Tympanic)   Resp 18   Ht 5' 8\" (1.727 m)   Wt (!) 324 lb (147 Images: 18       FINDINGS: Esophagus   Swallowing is normal. Piriform fossa and vallecula are well outlined and no masses are seen. There is a large hilar hernia present. There is a distention of The distal esophagus. There are tertiary peristaltic contractions. There is mucosal irregularity    and enlargement of The mucosal fold pattern compatible with reflux esophagitis. Spontaneous gastroesophageal reflux was seen to distal third esophagus. No strictures are identified. Upper GI   25% of stomach is herniated into The chest this does not reduce during exam. Is marked irregularity of The mucosa, at this upper portion. No evidence of ulcers seen there is normal peristalsis. Duodenal bulb was normally distensible and shows no ulcer. Duodenal sweep is normal.           Impression   1. Large hiatal hernia 25% of stomach within The hemithorax which does not reduce. 2. Marked tertiary peristaltic contractions   3. Mucosal irregularity at The distal esophagus and gastric fundus suggesting reflux esophagitis and gastritis with no evidence of ulcer. 4. Spontaneous gastroesophageal reflux was observed to be distal third esophagus.               **This report has been created using voice recognition software.  It may contain minor errors which are inherent in voice recognition technology. **       Final report electronically signed by Dr. Stephen Ac on 10/30/2018 10:35 AM

## 2018-11-29 ENCOUNTER — HOSPITAL ENCOUNTER (OUTPATIENT)
Age: 68
Discharge: HOME | End: 2018-11-29
Payer: MEDICARE

## 2018-11-29 DIAGNOSIS — Z01.818 PRE-OP TESTING: ICD-10-CM

## 2018-11-29 DIAGNOSIS — K44.9 HIATAL HERNIA: ICD-10-CM

## 2018-11-29 LAB
ANION GAP SERPL CALCULATED.3IONS-SCNC: 14 MEQ/L (ref 8–16)
BUN BLDV-MCNC: 17 MG/DL (ref 7–22)
CALCIUM SERPL-MCNC: 9.3 MG/DL (ref 8.5–10.5)
CHLORIDE BLD-SCNC: 101 MEQ/L (ref 98–111)
CO2: 25 MEQ/L (ref 23–33)
CREAT SERPL-MCNC: 0.9 MG/DL (ref 0.4–1.2)
GFR SERPL CREATININE-BSD FRML MDRD: 62 ML/MIN/1.73M2
GLUCOSE BLD-MCNC: 126 MG/DL (ref 70–108)
HCT VFR BLD CALC: 38.1 % (ref 37–47)
HEMOGLOBIN: 12.4 GM/DL (ref 12–16)
POTASSIUM SERPL-SCNC: 4.2 MEQ/L (ref 3.5–5.2)
SODIUM BLD-SCNC: 140 MEQ/L (ref 135–145)

## 2018-11-29 PROCEDURE — 85014 HEMATOCRIT: CPT

## 2018-11-29 PROCEDURE — 85018 HEMOGLOBIN: CPT

## 2018-11-29 PROCEDURE — 80048 BASIC METABOLIC PNL TOTAL CA: CPT

## 2018-11-29 PROCEDURE — 36415 COLL VENOUS BLD VENIPUNCTURE: CPT

## 2018-12-03 ENCOUNTER — OFFICE VISIT (OUTPATIENT)
Dept: CARDIOLOGY CLINIC | Age: 68
End: 2018-12-03
Payer: MEDICARE

## 2018-12-03 VITALS
DIASTOLIC BLOOD PRESSURE: 82 MMHG | WEIGHT: 293 LBS | HEIGHT: 68 IN | SYSTOLIC BLOOD PRESSURE: 144 MMHG | BODY MASS INDEX: 44.41 KG/M2 | HEART RATE: 73 BPM

## 2018-12-03 DIAGNOSIS — R06.09 DOE (DYSPNEA ON EXERTION): Primary | ICD-10-CM

## 2018-12-03 DIAGNOSIS — I10 ESSENTIAL HYPERTENSION: ICD-10-CM

## 2018-12-03 DIAGNOSIS — Z01.818 PRE-OP EVALUATION: ICD-10-CM

## 2018-12-03 DIAGNOSIS — I25.10 CORONARY ARTERY DISEASE INVOLVING NATIVE CORONARY ARTERY OF NATIVE HEART WITHOUT ANGINA PECTORIS: ICD-10-CM

## 2018-12-03 PROCEDURE — 93000 ELECTROCARDIOGRAM COMPLETE: CPT | Performed by: NUCLEAR MEDICINE

## 2018-12-03 PROCEDURE — 99213 OFFICE O/P EST LOW 20 MIN: CPT | Performed by: NUCLEAR MEDICINE

## 2018-12-03 RX ORDER — LANOLIN ALCOHOL/MO/W.PET/CERES
3 CREAM (GRAM) TOPICAL DAILY
COMMUNITY
End: 2019-05-20 | Stop reason: ALTCHOICE

## 2018-12-03 NOTE — PROGRESS NOTES
[address] CARDIOLOGY  [address]  [address] [facility]  Dept: [phone]  Dept Fax: [phone]  Loc: [phone]    Visit Date: 12/3/2018    Nancy Martin is a 76 y.o. female who presents todayfor:  Chief Complaint   Patient presents with    Check-Up    Pre-op Exam    Hypertension    Coronary Artery Disease    Diabetes   going for hernia surgery   Known hiatal hernia and symptoms  Cath 2014 mild CAd  No more dizziness  No chest pain  No changes in breathing  Severe obesity   DM is fair        HPI:  HPI  Past Medical History:   Diagnosis Date    Cancer ([address]     skin    SCOTT (dyspnea on exertion)     false positive stress test 2014 with normal cardiac cath 2014.     GERD (gastroesophageal reflux disease)     Headache     Migraines    Hyperglycemia 2015    borderline takes Metformin    Hyperlipidemia     Migraine headache     Morbid obesity (HCC)     Osteopenia     RLS (restless legs syndrome)     UTI (urinary tract infection)     history of      Past Surgical History:   Procedure Laterality Date    BUNIONECTOMY Right 1980's    CARPAL TUNNEL RELEASE Bilateral 1990 2008 1991 2016     x2 right x2 left    [facility]  [facility]    [facility]    HIP ARTHROPLASTY Left 02/2015    HYSTERECTOMY  1980    JOINT REPLACEMENT Right     KNEE ARTHROPLASTY Left 2007    KNEE ARTHROPLASTY Right 2014    UPPER GASTROINTESTINAL ENDOSCOPY      UPPER GASTROINTESTINAL ENDOSCOPY N/A 11/13/2018    EGD DIAGNOSTIC ONLY performed by Lambert Ledesma MD at [facility]     Family History   Problem Relation Age of Onset    Arthritis Mother     High Blood Pressure Mother     Cancer Sister         ovarian    Cancer Maternal Grandmother         breast    Arthritis Maternal Grandmother     Cancer Maternal Grandfather         colon    Arthritis Maternal Grandfather      Social History   Substance Use Topics    Smoking status: Never Smoker    pulses      Objective:  Physical Exam  BP (!) 144/82   Pulse 73   Ht 5' 8\" (1.727 m)   Wt (!) 320 lb (145.2 kg)   BMI 48.66 kg/m²   General:   Well developed, well nourished  Lungs:   Clear to auscultation  Heart:    Normal S1 S2, Slight murmur. no rubs, no gallops  Abdomen:   Soft, non tender, no organomegalies, positive bowel sounds  Extremities:   No edema, no cyanosis, good peripheral pulses  Neurological:   Awake, alert, oriented. No obvious focal deficits  Musculoskelatal:  No obvious deformities    Assessment:      Diagnosis Orders   1. SCOTT (dyspnea on exertion)  EKG 12 lead   2. Essential hypertension     3. Coronary artery disease involving native coronary artery of native heart without angina pectoris     4. Pre-op evaluation     cardiac fair for now   No indication to repeat testing   Plan:  No Follow-up on file. As above  Clear for surgery   Continue risk factor modification and medical management  Thank you for allowing me to participate in the care of your patient. Please don't hesitate to contact me regarding any further issues related to the patient care    Orders Placed:  Orders Placed This Encounter   Procedures    EKG 12 lead     Order Specific Question:   Reason for Exam?     Answer: Other       Medications Prescribed:  No orders of the defined types were placed in this encounter. Discussed use, benefit, and side effects of prescribed medications. All patient questions answered. Pt voicedunderstanding. Instructed to continue current medications, diet and exercise. Continue risk factor modification and medical management. Patient agreed with treatment plan. Follow up as directed.     Electronically signedby Stacy Haynes MD on 12/3/2018 at 12:39 PM

## 2018-12-05 ENCOUNTER — ANESTHESIA EVENT (OUTPATIENT)
Dept: OPERATING ROOM | Age: 68
DRG: 327 | End: 2018-12-05
Payer: MEDICARE

## 2018-12-05 ENCOUNTER — ANESTHESIA (OUTPATIENT)
Dept: OPERATING ROOM | Age: 68
DRG: 327 | End: 2018-12-05
Payer: MEDICARE

## 2018-12-05 ENCOUNTER — HOSPITAL ENCOUNTER (INPATIENT)
Age: 68
LOS: 2 days | Discharge: HOME | DRG: 327 | End: 2018-12-07
Attending: SURGERY | Admitting: SURGERY
Payer: MEDICARE

## 2018-12-05 VITALS
RESPIRATION RATE: 6 BRPM | TEMPERATURE: 96.4 F | OXYGEN SATURATION: 90 % | DIASTOLIC BLOOD PRESSURE: 103 MMHG | SYSTOLIC BLOOD PRESSURE: 182 MMHG

## 2018-12-05 DIAGNOSIS — K44.9 HIATAL HERNIA: Primary | ICD-10-CM

## 2018-12-05 LAB
GLUCOSE BLD-MCNC: 131 MG/DL (ref 70–108)
GLUCOSE BLD-MCNC: 170 MG/DL (ref 70–108)
GLUCOSE BLD-MCNC: 201 MG/DL (ref 70–108)

## 2018-12-05 PROCEDURE — 2780000010 HC IMPLANT OTHER: Performed by: SURGERY

## 2018-12-05 PROCEDURE — 2500000003 HC RX 250 WO HCPCS: Performed by: NURSE ANESTHETIST, CERTIFIED REGISTERED

## 2018-12-05 PROCEDURE — 7100000001 HC PACU RECOVERY - ADDTL 15 MIN: Performed by: SURGERY

## 2018-12-05 PROCEDURE — 3600000019 HC SURGERY ROBOT ADDTL 15MIN: Performed by: SURGERY

## 2018-12-05 PROCEDURE — 7100000000 HC PACU RECOVERY - FIRST 15 MIN: Performed by: SURGERY

## 2018-12-05 PROCEDURE — 6370000000 HC RX 637 (ALT 250 FOR IP): Performed by: ANESTHESIOLOGY

## 2018-12-05 PROCEDURE — 6360000002 HC RX W HCPCS: Performed by: NURSE ANESTHETIST, CERTIFIED REGISTERED

## 2018-12-05 PROCEDURE — 2580000003 HC RX 258: Performed by: NURSE ANESTHETIST, CERTIFIED REGISTERED

## 2018-12-05 PROCEDURE — C9113 INJ PANTOPRAZOLE SODIUM, VIA: HCPCS | Performed by: SURGERY

## 2018-12-05 PROCEDURE — 3700000000 HC ANESTHESIA ATTENDED CARE: Performed by: SURGERY

## 2018-12-05 PROCEDURE — 6360000002 HC RX W HCPCS: Performed by: SURGERY

## 2018-12-05 PROCEDURE — 2709999900 HC NON-CHARGEABLE SUPPLY: Performed by: SURGERY

## 2018-12-05 PROCEDURE — C1781 MESH (IMPLANTABLE): HCPCS | Performed by: SURGERY

## 2018-12-05 PROCEDURE — 2700000000 HC OXYGEN THERAPY PER DAY

## 2018-12-05 PROCEDURE — 2500000003 HC RX 250 WO HCPCS: Performed by: SURGERY

## 2018-12-05 PROCEDURE — 2500000003 HC RX 250 WO HCPCS: Performed by: ANESTHESIOLOGY

## 2018-12-05 PROCEDURE — 0DV44ZZ RESTRICTION OF ESOPHAGOGASTRIC JUNCTION, PERCUTANEOUS ENDOSCOPIC APPROACH: ICD-10-PCS | Performed by: SURGERY

## 2018-12-05 PROCEDURE — 2580000003 HC RX 258: Performed by: SURGERY

## 2018-12-05 PROCEDURE — 94760 N-INVAS EAR/PLS OXIMETRY 1: CPT

## 2018-12-05 PROCEDURE — 3700000001 HC ADD 15 MINUTES (ANESTHESIA): Performed by: SURGERY

## 2018-12-05 PROCEDURE — 6370000000 HC RX 637 (ALT 250 FOR IP): Performed by: SURGERY

## 2018-12-05 PROCEDURE — 8E0W4CZ ROBOTIC ASSISTED PROCEDURE OF TRUNK REGION, PERCUTANEOUS ENDOSCOPIC APPROACH: ICD-10-PCS | Performed by: SURGERY

## 2018-12-05 PROCEDURE — 82948 REAGENT STRIP/BLOOD GLUCOSE: CPT

## 2018-12-05 PROCEDURE — 0BUT4JZ SUPPLEMENT DIAPHRAGM WITH SYNTHETIC SUBSTITUTE, PERCUTANEOUS ENDOSCOPIC APPROACH: ICD-10-PCS | Performed by: SURGERY

## 2018-12-05 PROCEDURE — S2900 ROBOTIC SURGICAL SYSTEM: HCPCS | Performed by: SURGERY

## 2018-12-05 PROCEDURE — 43282 LAP PARAESOPH HER RPR W/MESH: CPT | Performed by: SURGERY

## 2018-12-05 PROCEDURE — 94010 BREATHING CAPACITY TEST: CPT

## 2018-12-05 PROCEDURE — 1200000003 HC TELEMETRY R&B

## 2018-12-05 PROCEDURE — 3600000009 HC SURGERY ROBOT BASE: Performed by: SURGERY

## 2018-12-05 PROCEDURE — 6360000002 HC RX W HCPCS: Performed by: ANESTHESIOLOGY

## 2018-12-05 DEVICE — SEALANT HEMSTAT 5ML HUM FIBRIN THROM 2 VI APPL DEV EVICEL: Type: IMPLANTABLE DEVICE | Status: FUNCTIONAL

## 2018-12-05 DEVICE — MESH HERN W10XL10CM MFIL RESRB SQ W/ HYDRGEL BARR PHASIX ST: Type: IMPLANTABLE DEVICE | Status: FUNCTIONAL

## 2018-12-05 RX ORDER — DEXTROSE MONOHYDRATE 50 MG/ML
100 INJECTION, SOLUTION INTRAVENOUS PRN
Status: DISCONTINUED | OUTPATIENT
Start: 2018-12-05 | End: 2018-12-07 | Stop reason: HOSPADM

## 2018-12-05 RX ORDER — ONDANSETRON 2 MG/ML
INJECTION INTRAMUSCULAR; INTRAVENOUS PRN
Status: DISCONTINUED | OUTPATIENT
Start: 2018-12-05 | End: 2018-12-05 | Stop reason: SDUPTHER

## 2018-12-05 RX ORDER — DEXAMETHASONE SODIUM PHOSPHATE 4 MG/ML
INJECTION, SOLUTION INTRA-ARTICULAR; INTRALESIONAL; INTRAMUSCULAR; INTRAVENOUS; SOFT TISSUE PRN
Status: DISCONTINUED | OUTPATIENT
Start: 2018-12-05 | End: 2018-12-06 | Stop reason: SDUPTHER

## 2018-12-05 RX ORDER — LANOLIN ALCOHOL/MO/W.PET/CERES
3 CREAM (GRAM) TOPICAL NIGHTLY
Status: DISCONTINUED | OUTPATIENT
Start: 2018-12-05 | End: 2018-12-07 | Stop reason: HOSPADM

## 2018-12-05 RX ORDER — HYDROCODONE BITARTRATE AND ACETAMINOPHEN 5; 325 MG/1; MG/1
2 TABLET ORAL EVERY 4 HOURS PRN
Status: DISCONTINUED | OUTPATIENT
Start: 2018-12-05 | End: 2018-12-07 | Stop reason: HOSPADM

## 2018-12-05 RX ORDER — SODIUM CHLORIDE 9 MG/ML
INJECTION, SOLUTION INTRAVENOUS CONTINUOUS
Status: DISCONTINUED | OUTPATIENT
Start: 2018-12-05 | End: 2018-12-05

## 2018-12-05 RX ORDER — MORPHINE SULFATE 4 MG/ML
4 INJECTION, SOLUTION INTRAMUSCULAR; INTRAVENOUS
Status: DISCONTINUED | OUTPATIENT
Start: 2018-12-05 | End: 2018-12-07 | Stop reason: HOSPADM

## 2018-12-05 RX ORDER — LABETALOL HYDROCHLORIDE 5 MG/ML
5 INJECTION, SOLUTION INTRAVENOUS EVERY 5 MIN PRN
Status: DISCONTINUED | OUTPATIENT
Start: 2018-12-05 | End: 2018-12-05 | Stop reason: HOSPADM

## 2018-12-05 RX ORDER — PROPOFOL 10 MG/ML
INJECTION, EMULSION INTRAVENOUS PRN
Status: DISCONTINUED | OUTPATIENT
Start: 2018-12-05 | End: 2018-12-06 | Stop reason: SDUPTHER

## 2018-12-05 RX ORDER — 0.9 % SODIUM CHLORIDE 0.9 %
10 VIAL (ML) INJECTION DAILY
Status: DISCONTINUED | OUTPATIENT
Start: 2018-12-05 | End: 2018-12-07 | Stop reason: HOSPADM

## 2018-12-05 RX ORDER — DULOXETIN HYDROCHLORIDE 30 MG/1
30 CAPSULE, DELAYED RELEASE ORAL DAILY
Status: DISCONTINUED | OUTPATIENT
Start: 2018-12-05 | End: 2018-12-07 | Stop reason: HOSPADM

## 2018-12-05 RX ORDER — SODIUM CHLORIDE 9 MG/ML
INJECTION, SOLUTION INTRAVENOUS CONTINUOUS
Status: DISCONTINUED | OUTPATIENT
Start: 2018-12-05 | End: 2018-12-07

## 2018-12-05 RX ORDER — ATENOLOL 25 MG/1
12.5 TABLET ORAL DAILY
Status: DISCONTINUED | OUTPATIENT
Start: 2018-12-06 | End: 2018-12-07 | Stop reason: HOSPADM

## 2018-12-05 RX ORDER — NEOSTIGMINE METHYLSULFATE 5 MG/5 ML
SYRINGE (ML) INTRAVENOUS PRN
Status: DISCONTINUED | OUTPATIENT
Start: 2018-12-05 | End: 2018-12-06 | Stop reason: SDUPTHER

## 2018-12-05 RX ORDER — ACETAMINOPHEN 325 MG/1
650 TABLET ORAL EVERY 4 HOURS PRN
Status: DISCONTINUED | OUTPATIENT
Start: 2018-12-05 | End: 2018-12-07 | Stop reason: HOSPADM

## 2018-12-05 RX ORDER — ATORVASTATIN CALCIUM 10 MG/1
10 TABLET, FILM COATED ORAL DAILY
Status: DISCONTINUED | OUTPATIENT
Start: 2018-12-05 | End: 2018-12-07 | Stop reason: HOSPADM

## 2018-12-05 RX ORDER — MORPHINE SULFATE 2 MG/ML
2 INJECTION, SOLUTION INTRAMUSCULAR; INTRAVENOUS
Status: DISCONTINUED | OUTPATIENT
Start: 2018-12-05 | End: 2018-12-07 | Stop reason: HOSPADM

## 2018-12-05 RX ORDER — SODIUM CHLORIDE 0.9 % (FLUSH) 0.9 %
10 SYRINGE (ML) INJECTION PRN
Status: DISCONTINUED | OUTPATIENT
Start: 2018-12-05 | End: 2018-12-05 | Stop reason: SDUPTHER

## 2018-12-05 RX ORDER — ROPINIROLE 0.5 MG/1
0.5 TABLET, FILM COATED ORAL NIGHTLY
Status: DISCONTINUED | OUTPATIENT
Start: 2018-12-05 | End: 2018-12-07 | Stop reason: HOSPADM

## 2018-12-05 RX ORDER — FENTANYL CITRATE 50 UG/ML
50 INJECTION, SOLUTION INTRAMUSCULAR; INTRAVENOUS EVERY 5 MIN PRN
Status: DISCONTINUED | OUTPATIENT
Start: 2018-12-05 | End: 2018-12-05 | Stop reason: HOSPADM

## 2018-12-05 RX ORDER — SODIUM CHLORIDE 9 MG/ML
INJECTION, SOLUTION INTRAVENOUS CONTINUOUS PRN
Status: DISCONTINUED | OUTPATIENT
Start: 2018-12-05 | End: 2018-12-06 | Stop reason: SDUPTHER

## 2018-12-05 RX ORDER — FENTANYL CITRATE 50 UG/ML
INJECTION, SOLUTION INTRAMUSCULAR; INTRAVENOUS
Status: DISPENSED
Start: 2018-12-05 | End: 2018-12-06

## 2018-12-05 RX ORDER — FENTANYL CITRATE 50 UG/ML
INJECTION, SOLUTION INTRAMUSCULAR; INTRAVENOUS PRN
Status: DISCONTINUED | OUTPATIENT
Start: 2018-12-05 | End: 2018-12-06 | Stop reason: SDUPTHER

## 2018-12-05 RX ORDER — DEXTROSE MONOHYDRATE 25 G/50ML
12.5 INJECTION, SOLUTION INTRAVENOUS PRN
Status: DISCONTINUED | OUTPATIENT
Start: 2018-12-05 | End: 2018-12-07 | Stop reason: HOSPADM

## 2018-12-05 RX ORDER — NICOTINE POLACRILEX 4 MG
15 LOZENGE BUCCAL PRN
Status: DISCONTINUED | OUTPATIENT
Start: 2018-12-05 | End: 2018-12-07 | Stop reason: HOSPADM

## 2018-12-05 RX ORDER — ROCURONIUM BROMIDE 10 MG/ML
INJECTION, SOLUTION INTRAVENOUS PRN
Status: DISCONTINUED | OUTPATIENT
Start: 2018-12-05 | End: 2018-12-06 | Stop reason: SDUPTHER

## 2018-12-05 RX ORDER — ACETAMINOPHEN 650 MG/1
650 SUPPOSITORY RECTAL EVERY 4 HOURS PRN
Status: DISCONTINUED | OUTPATIENT
Start: 2018-12-05 | End: 2018-12-07 | Stop reason: HOSPADM

## 2018-12-05 RX ORDER — HYDROCODONE BITARTRATE AND ACETAMINOPHEN 5; 325 MG/1; MG/1
1 TABLET ORAL EVERY 4 HOURS PRN
Status: DISCONTINUED | OUTPATIENT
Start: 2018-12-05 | End: 2018-12-07 | Stop reason: HOSPADM

## 2018-12-05 RX ORDER — GLYCOPYRROLATE 1 MG/5 ML
SYRINGE (ML) INTRAVENOUS PRN
Status: DISCONTINUED | OUTPATIENT
Start: 2018-12-05 | End: 2018-12-06 | Stop reason: SDUPTHER

## 2018-12-05 RX ORDER — LABETALOL HYDROCHLORIDE 5 MG/ML
5 INJECTION, SOLUTION INTRAVENOUS EVERY 10 MIN PRN
Status: DISCONTINUED | OUTPATIENT
Start: 2018-12-05 | End: 2018-12-05 | Stop reason: HOSPADM

## 2018-12-05 RX ORDER — SUCCINYLCHOLINE/SOD CL,ISO/PF 100 MG/5ML
SYRINGE (ML) INTRAVENOUS PRN
Status: DISCONTINUED | OUTPATIENT
Start: 2018-12-05 | End: 2018-12-06 | Stop reason: SDUPTHER

## 2018-12-05 RX ORDER — PANTOPRAZOLE SODIUM 40 MG/10ML
40 INJECTION, POWDER, LYOPHILIZED, FOR SOLUTION INTRAVENOUS DAILY
Status: DISCONTINUED | OUTPATIENT
Start: 2018-12-05 | End: 2018-12-07 | Stop reason: HOSPADM

## 2018-12-05 RX ORDER — MEPERIDINE HYDROCHLORIDE 25 MG/ML
12.5 INJECTION INTRAMUSCULAR; INTRAVENOUS; SUBCUTANEOUS EVERY 5 MIN PRN
Status: DISCONTINUED | OUTPATIENT
Start: 2018-12-05 | End: 2018-12-05 | Stop reason: HOSPADM

## 2018-12-05 RX ORDER — SUMATRIPTAN 50 MG/1
50 TABLET, FILM COATED ORAL
Status: DISPENSED | OUTPATIENT
Start: 2018-12-05 | End: 2018-12-05

## 2018-12-05 RX ORDER — ONDANSETRON 2 MG/ML
4 INJECTION INTRAMUSCULAR; INTRAVENOUS EVERY 6 HOURS PRN
Status: DISCONTINUED | OUTPATIENT
Start: 2018-12-05 | End: 2018-12-07 | Stop reason: HOSPADM

## 2018-12-05 RX ORDER — ONDANSETRON 2 MG/ML
4 INJECTION INTRAMUSCULAR; INTRAVENOUS
Status: DISCONTINUED | OUTPATIENT
Start: 2018-12-05 | End: 2018-12-05 | Stop reason: HOSPADM

## 2018-12-05 RX ORDER — SODIUM CHLORIDE 0.9 % (FLUSH) 0.9 %
10 SYRINGE (ML) INJECTION EVERY 12 HOURS SCHEDULED
Status: DISCONTINUED | OUTPATIENT
Start: 2018-12-05 | End: 2018-12-05 | Stop reason: SDUPTHER

## 2018-12-05 RX ORDER — EPHEDRINE SULFATE/0.9% NACL/PF 50 MG/5 ML
SYRINGE (ML) INTRAVENOUS PRN
Status: DISCONTINUED | OUTPATIENT
Start: 2018-12-05 | End: 2018-12-06 | Stop reason: SDUPTHER

## 2018-12-05 RX ORDER — BUPIVACAINE HYDROCHLORIDE AND EPINEPHRINE 5; 5 MG/ML; UG/ML
INJECTION, SOLUTION EPIDURAL; INTRACAUDAL; PERINEURAL PRN
Status: DISCONTINUED | OUTPATIENT
Start: 2018-12-05 | End: 2018-12-05 | Stop reason: HOSPADM

## 2018-12-05 RX ORDER — SODIUM CHLORIDE 0.9 % (FLUSH) 0.9 %
10 SYRINGE (ML) INJECTION EVERY 12 HOURS SCHEDULED
Status: DISCONTINUED | OUTPATIENT
Start: 2018-12-05 | End: 2018-12-07 | Stop reason: HOSPADM

## 2018-12-05 RX ORDER — SODIUM CHLORIDE 0.9 % (FLUSH) 0.9 %
10 SYRINGE (ML) INJECTION PRN
Status: DISCONTINUED | OUTPATIENT
Start: 2018-12-05 | End: 2018-12-07 | Stop reason: HOSPADM

## 2018-12-05 RX ADMIN — FENTANYL CITRATE 100 MCG: 50 INJECTION, SOLUTION INTRAMUSCULAR; INTRAVENOUS at 12:44

## 2018-12-05 RX ADMIN — Medication 3 MG: at 21:59

## 2018-12-05 RX ADMIN — ONDANSETRON HYDROCHLORIDE 4 MG: 2 SOLUTION INTRAMUSCULAR; INTRAVENOUS at 14:43

## 2018-12-05 RX ADMIN — CEFOXITIN SODIUM 2 G: 1 POWDER, FOR SOLUTION INTRAVENOUS at 12:59

## 2018-12-05 RX ADMIN — Medication 10 MG: at 13:03

## 2018-12-05 RX ADMIN — DEXAMETHASONE SODIUM PHOSPHATE 12 MG: 4 INJECTION, SOLUTION INTRAMUSCULAR; INTRAVENOUS at 13:15

## 2018-12-05 RX ADMIN — PANTOPRAZOLE SODIUM 40 MG: 40 INJECTION, POWDER, FOR SOLUTION INTRAVENOUS at 17:44

## 2018-12-05 RX ADMIN — FENTANYL CITRATE 50 MCG: 50 INJECTION, SOLUTION INTRAMUSCULAR; INTRAVENOUS at 14:30

## 2018-12-05 RX ADMIN — Medication 1 MG: at 14:30

## 2018-12-05 RX ADMIN — ATORVASTATIN CALCIUM 10 MG: 10 TABLET, FILM COATED ORAL at 17:44

## 2018-12-05 RX ADMIN — Medication 10 MG: at 12:56

## 2018-12-05 RX ADMIN — INSULIN HUMAN 4 UNITS: 100 INJECTION, SOLUTION PARENTERAL at 15:40

## 2018-12-05 RX ADMIN — SODIUM CHLORIDE: 9 INJECTION, SOLUTION INTRAVENOUS at 17:27

## 2018-12-05 RX ADMIN — Medication 5 MG: at 14:30

## 2018-12-05 RX ADMIN — PROPOFOL 200 MG: 10 INJECTION, EMULSION INTRAVENOUS at 12:44

## 2018-12-05 RX ADMIN — SODIUM CHLORIDE: 9 INJECTION, SOLUTION INTRAVENOUS at 11:33

## 2018-12-05 RX ADMIN — Medication 10 MG: at 13:05

## 2018-12-05 RX ADMIN — HYDROCODONE BITARTRATE AND ACETAMINOPHEN 2 TABLET: 5; 325 TABLET ORAL at 21:59

## 2018-12-05 RX ADMIN — Medication 10 ML: at 17:44

## 2018-12-05 RX ADMIN — FENTANYL CITRATE 50 MCG: 50 INJECTION, SOLUTION INTRAMUSCULAR; INTRAVENOUS at 15:33

## 2018-12-05 RX ADMIN — ROPINIROLE HYDROCHLORIDE 0.5 MG: 0.5 TABLET, FILM COATED ORAL at 19:59

## 2018-12-05 RX ADMIN — SODIUM CHLORIDE: 9 INJECTION, SOLUTION INTRAVENOUS at 16:02

## 2018-12-05 RX ADMIN — LABETALOL 20 MG/4 ML (5 MG/ML) INTRAVENOUS SYRINGE 5 MG: at 15:26

## 2018-12-05 RX ADMIN — Medication 10 MG: at 12:45

## 2018-12-05 RX ADMIN — ROCURONIUM BROMIDE 10 MG: 10 INJECTION, SOLUTION INTRAVENOUS at 14:05

## 2018-12-05 RX ADMIN — Medication 20 MG: at 13:20

## 2018-12-05 RX ADMIN — DULOXETINE HYDROCHLORIDE 30 MG: 30 CAPSULE, DELAYED RELEASE ORAL at 17:44

## 2018-12-05 RX ADMIN — ROCURONIUM BROMIDE 50 MG: 10 INJECTION, SOLUTION INTRAVENOUS at 12:55

## 2018-12-05 RX ADMIN — HYDROCODONE BITARTRATE AND ACETAMINOPHEN 2 TABLET: 5; 325 TABLET ORAL at 17:33

## 2018-12-05 RX ADMIN — SODIUM CHLORIDE: 9 INJECTION, SOLUTION INTRAVENOUS at 12:48

## 2018-12-05 RX ADMIN — Medication 10 MG: at 13:10

## 2018-12-05 RX ADMIN — Medication 160 MG: at 12:44

## 2018-12-05 RX ADMIN — CEFOXITIN SODIUM 1 G: 1 POWDER, FOR SOLUTION INTRAVENOUS at 19:59

## 2018-12-05 ASSESSMENT — PAIN SCALES - GENERAL
PAINLEVEL_OUTOF10: 5
PAINLEVEL_OUTOF10: 7
PAINLEVEL_OUTOF10: 3
PAINLEVEL_OUTOF10: 7
PAINLEVEL_OUTOF10: 3
PAINLEVEL_OUTOF10: 7

## 2018-12-05 ASSESSMENT — PULMONARY FUNCTION TESTS
PIF_VALUE: 27
PIF_VALUE: 20
PIF_VALUE: 27
PIF_VALUE: 25
PIF_VALUE: 21
PIF_VALUE: 23
PIF_VALUE: 25
PIF_VALUE: 1
PIF_VALUE: 27
PIF_VALUE: 25
PIF_VALUE: 23
PIF_VALUE: 25
PIF_VALUE: 23
PIF_VALUE: 23
PIF_VALUE: 27
PIF_VALUE: 25
PIF_VALUE: 24
PIF_VALUE: 27
PIF_VALUE: 25
PIF_VALUE: 25
PIF_VALUE: 23
PIF_VALUE: 23
PIF_VALUE: 5
PIF_VALUE: 25
PIF_VALUE: 27
PIF_VALUE: 25
PIF_VALUE: 23
PIF_VALUE: 2
PIF_VALUE: 23
PIF_VALUE: 25
PIF_VALUE: 23
PIF_VALUE: 27
PIF_VALUE: 25
PIF_VALUE: 27
PIF_VALUE: 25
PIF_VALUE: 23
PIF_VALUE: 12
PIF_VALUE: 23
PIF_VALUE: 27
PIF_VALUE: 25
PIF_VALUE: 25
PIF_VALUE: 27
PIF_VALUE: 23
PIF_VALUE: 25
PIF_VALUE: 27
PIF_VALUE: 23
PIF_VALUE: 25
PIF_VALUE: 27
PIF_VALUE: 23
PIF_VALUE: 25
PIF_VALUE: 25
PIF_VALUE: 20
PIF_VALUE: 25
PIF_VALUE: 27
PIF_VALUE: 25
PIF_VALUE: 27
PIF_VALUE: 27
PIF_VALUE: 25
PIF_VALUE: 27
PIF_VALUE: 25
PIF_VALUE: 25
PIF_VALUE: 23
PIF_VALUE: 27
PIF_VALUE: 25
PIF_VALUE: 27
PIF_VALUE: 2
PIF_VALUE: 25
PIF_VALUE: 26
PIF_VALUE: 23
PIF_VALUE: 25
PIF_VALUE: 23
PIF_VALUE: 25
PIF_VALUE: 23
PIF_VALUE: 27
PIF_VALUE: 25
PIF_VALUE: 27
PIF_VALUE: 25
PIF_VALUE: 10
PIF_VALUE: 2
PIF_VALUE: 27
PIF_VALUE: 25
PIF_VALUE: 27
PIF_VALUE: 27
PIF_VALUE: 23
PIF_VALUE: 27
PIF_VALUE: 23
PIF_VALUE: 23
PIF_VALUE: 25
PIF_VALUE: 23
PIF_VALUE: 27
PIF_VALUE: 25
PIF_VALUE: 25
PIF_VALUE: 2
PIF_VALUE: 25
PIF_VALUE: 27
PIF_VALUE: 25
PIF_VALUE: 25
PIF_VALUE: 23
PIF_VALUE: 27
PIF_VALUE: 25
PIF_VALUE: 2
PIF_VALUE: 27
PIF_VALUE: 8
PIF_VALUE: 10
PIF_VALUE: 24
PIF_VALUE: 25
PIF_VALUE: 23
PIF_VALUE: 25
PIF_VALUE: 23
PIF_VALUE: 2
PIF_VALUE: 25
PIF_VALUE: 24
PIF_VALUE: 24
PIF_VALUE: 23
PIF_VALUE: 25
PIF_VALUE: 23
PIF_VALUE: 25
PIF_VALUE: 25
PIF_VALUE: 27
PIF_VALUE: 27
PIF_VALUE: 25
PIF_VALUE: 25
PIF_VALUE: 24
PIF_VALUE: 25
PIF_VALUE: 2

## 2018-12-05 ASSESSMENT — PAIN DESCRIPTION - PROGRESSION
CLINICAL_PROGRESSION: NOT CHANGED
CLINICAL_PROGRESSION: GRADUALLY WORSENING

## 2018-12-05 ASSESSMENT — PAIN SCALES - WONG BAKER
WONGBAKER_NUMERICALRESPONSE: 0
WONGBAKER_NUMERICALRESPONSE: 0

## 2018-12-05 ASSESSMENT — PAIN DESCRIPTION - DESCRIPTORS
DESCRIPTORS: CONSTANT;DISCOMFORT
DESCRIPTORS: DISCOMFORT;CONSTANT

## 2018-12-05 ASSESSMENT — PAIN DESCRIPTION - ONSET
ONSET: ON-GOING
ONSET: PROGRESSIVE

## 2018-12-05 ASSESSMENT — ENCOUNTER SYMPTOMS
DYSPNEA ACTIVITY LEVEL: AFTER AMBULATING 1 FLIGHT OF STAIRS
SHORTNESS OF BREATH: 1

## 2018-12-05 ASSESSMENT — PAIN DESCRIPTION - LOCATION
LOCATION: ABDOMEN

## 2018-12-05 ASSESSMENT — PAIN DESCRIPTION - FREQUENCY
FREQUENCY: CONTINUOUS
FREQUENCY: CONTINUOUS

## 2018-12-05 ASSESSMENT — PAIN DESCRIPTION - PAIN TYPE
TYPE: ACUTE PAIN;SURGICAL PAIN
TYPE: ACUTE PAIN;SURGICAL PAIN

## 2018-12-05 NOTE — PROGRESS NOTES
1507: Patient arrived to PACU. Report received from Titi Diaz and Christelle Corona. Patient placed on cardiac monitor. Patient drowsy, but wakes up when name is called. 1525: notified Dr. Anne Vazquez that patient is having pain and that blood pressures are elevated. He will place orders. Ok to give labetalol, but not to drop pressure too much. 1533: patient starting to grimace and states she is having \"bad pain\". Pt medicated with fentanyl. See mar. 1535: patient's blood sugar 201. Notified Dr. Anne Vazquez. Orders to cover SS per NPO which is 4 units. 1555: patient denies nausea. Given ice chips which she tolerated. 1605: patient meets pacu discharge criteria. Report was given to [facility], RN on [facility]. Patient transported to [facility] in stable condition on 2 liters. Patient's family notified. Dentures and chart on bed.

## 2018-12-05 NOTE — ANESTHESIA PRE PROCEDURE
injection 10 mL  10 mL Intravenous PRN Margarita Davalos MD        cefOXitin (MEFOXIN) 2 g in dextrose 5% 50 mL (mini-bag)  2 g Intravenous On Call to [address] [place] [address] [place], MD           Allergies:  No Known Allergies    Problem List:    Patient Active Problem List   Diagnosis Code    Hyperglycemia R73.9    GERD (gastroesophageal reflux disease) K21.9    SCOTT (dyspnea on exertion) R06.09    Hyperlipidemia E78.5    Morbid obesity (HCC) E66.01    RLS (restless legs syndrome) G25.81    Osteopenia M85.80    Hiatal hernia K44.9       Past Medical History:        Diagnosis Date    Cancer ([facility] [address]     skin    SCOTT (dyspnea on exertion)     false positive stress test 2014 with normal cardiac cath 2014.     GERD (gastroesophageal reflux disease)     Headache     Migraines    Hyperglycemia 2015    borderline takes Metformin    Hyperlipidemia     Migraine headache     Morbid obesity (HCC)     Osteopenia     RLS (restless legs syndrome)     UTI (urinary tract infection)     history of       Past Surgical History:        Procedure Laterality Date    BUNIONECTOMY Right 1980's    CARPAL TUNNEL RELEASE Bilateral 1990 2008 [address] 2016     x2 right x2 left    [facility]  [facility]    [facility]    HIP ARTHROPLASTY Left 02/2015    HYSTERECTOMY  1980    JOINT REPLACEMENT Right     KNEE ARTHROPLASTY Left 2007    KNEE ARTHROPLASTY Right 2014    UPPER GASTROINTESTINAL ENDOSCOPY      UPPER GASTROINTESTINAL ENDOSCOPY N/A 11/13/2018    EGD DIAGNOSTIC ONLY performed by Luis Chavez MD at [facility]       Social History:    Social History   Substance Use Topics    Smoking status: Never Smoker    Smokeless tobacco: Never Used    Alcohol use No                                Counseling given: Not Answered      Vital Signs (Current):   Vitals:    12/05/18 1059   BP: (!) 164/89   Pulse: 75   Resp: 18   Temp: 97.2 °F (36.2 °C)   TempSrc: Temporal   SpO2: 96%   Weight: (!) 320 lb 5.3 oz (145.3 kg)   Height: 5' 8\" (1.727

## 2018-12-05 NOTE — H&P
[facility]  History and Physical Update    Pt Name: Juancarlos Narvaez  MRN: [id number]  YOB: 1950  Date of evaluation: 12/5/2018    [x] I have examined the patient and reviewed the H&P/Consult and there are no changes to the patient or plans.     [] I have examined the patient and reviewed the H&P/Consult and have noted the following changes:        Angus Casas MD  Electronically signed 12/5/2018 at 12:23 PM

## 2018-12-06 LAB
ANION GAP SERPL CALCULATED.3IONS-SCNC: 12 MEQ/L (ref 8–16)
BASOPHILS # BLD: 0.1 %
BASOPHILS ABSOLUTE: 0 THOU/MM3 (ref 0–0.1)
BUN BLDV-MCNC: 20 MG/DL (ref 7–22)
CALCIUM SERPL-MCNC: 7.9 MG/DL (ref 8.5–10.5)
CHLORIDE BLD-SCNC: 105 MEQ/L (ref 98–111)
CO2: 19 MEQ/L (ref 23–33)
CREAT SERPL-MCNC: 0.9 MG/DL (ref 0.4–1.2)
EOSINOPHIL # BLD: 0 %
EOSINOPHILS ABSOLUTE: 0 THOU/MM3 (ref 0–0.4)
ERYTHROCYTE [DISTWIDTH] IN BLOOD BY AUTOMATED COUNT: 14.4 % (ref 11.5–14.5)
ERYTHROCYTE [DISTWIDTH] IN BLOOD BY AUTOMATED COUNT: 47.8 FL (ref 35–45)
GFR SERPL CREATININE-BSD FRML MDRD: 62 ML/MIN/1.73M2
GLUCOSE BLD-MCNC: 118 MG/DL (ref 70–108)
GLUCOSE BLD-MCNC: 122 MG/DL (ref 70–108)
GLUCOSE BLD-MCNC: 139 MG/DL (ref 70–108)
GLUCOSE BLD-MCNC: 181 MG/DL (ref 70–108)
GLUCOSE BLD-MCNC: 185 MG/DL (ref 70–108)
HCT VFR BLD CALC: 38 % (ref 37–47)
HEMOGLOBIN: 11.9 GM/DL (ref 12–16)
IMMATURE GRANS (ABS): 0.06 THOU/MM3 (ref 0–0.07)
IMMATURE GRANULOCYTES: 0.5 %
LYMPHOCYTES # BLD: 6 %
LYMPHOCYTES ABSOLUTE: 0.8 THOU/MM3 (ref 1–4.8)
MCH RBC QN AUTO: 28.4 PG (ref 26–33)
MCHC RBC AUTO-ENTMCNC: 31.3 GM/DL (ref 32.2–35.5)
MCV RBC AUTO: 90.7 FL (ref 81–99)
MONOCYTES # BLD: 3.6 %
MONOCYTES ABSOLUTE: 0.5 THOU/MM3 (ref 0.4–1.3)
NUCLEATED RED BLOOD CELLS: 0 /100 WBC
PLATELET # BLD: 233 THOU/MM3 (ref 130–400)
PMV BLD AUTO: 9.6 FL (ref 9.4–12.4)
POTASSIUM SERPL-SCNC: 4.6 MEQ/L (ref 3.5–5.2)
POTASSIUM SERPL-SCNC: 5.3 MEQ/L (ref 3.5–5.2)
RBC # BLD: 4.19 MILL/MM3 (ref 4.2–5.4)
SEG NEUTROPHILS: 89.8 %
SEGMENTED NEUTROPHILS ABSOLUTE COUNT: 11.6 THOU/MM3 (ref 1.8–7.7)
SODIUM BLD-SCNC: 136 MEQ/L (ref 135–145)
WBC # BLD: 12.9 THOU/MM3 (ref 4.8–10.8)

## 2018-12-06 PROCEDURE — 6360000002 HC RX W HCPCS: Performed by: SURGERY

## 2018-12-06 PROCEDURE — 85025 COMPLETE CBC W/AUTO DIFF WBC: CPT

## 2018-12-06 PROCEDURE — 84132 ASSAY OF SERUM POTASSIUM: CPT

## 2018-12-06 PROCEDURE — C9113 INJ PANTOPRAZOLE SODIUM, VIA: HCPCS | Performed by: SURGERY

## 2018-12-06 PROCEDURE — 6370000000 HC RX 637 (ALT 250 FOR IP): Performed by: SURGERY

## 2018-12-06 PROCEDURE — 99024 POSTOP FOLLOW-UP VISIT: CPT | Performed by: SURGERY

## 2018-12-06 PROCEDURE — 80048 BASIC METABOLIC PNL TOTAL CA: CPT

## 2018-12-06 PROCEDURE — 36415 COLL VENOUS BLD VENIPUNCTURE: CPT

## 2018-12-06 PROCEDURE — 1200000003 HC TELEMETRY R&B

## 2018-12-06 PROCEDURE — 82948 REAGENT STRIP/BLOOD GLUCOSE: CPT

## 2018-12-06 PROCEDURE — 2700000000 HC OXYGEN THERAPY PER DAY

## 2018-12-06 PROCEDURE — 94760 N-INVAS EAR/PLS OXIMETRY 1: CPT

## 2018-12-06 PROCEDURE — 2580000003 HC RX 258: Performed by: SURGERY

## 2018-12-06 RX ORDER — POLYETHYLENE GLYCOL 3350 17 G/17G
17 POWDER, FOR SOLUTION ORAL DAILY
Status: DISCONTINUED | OUTPATIENT
Start: 2018-12-06 | End: 2018-12-07 | Stop reason: HOSPADM

## 2018-12-06 RX ORDER — MORPHINE SULFATE 2 MG/ML
2 INJECTION, SOLUTION INTRAMUSCULAR; INTRAVENOUS ONCE
Status: DISCONTINUED | OUTPATIENT
Start: 2018-12-06 | End: 2018-12-06

## 2018-12-06 RX ADMIN — CEFOXITIN SODIUM 1 G: 1 POWDER, FOR SOLUTION INTRAVENOUS at 06:37

## 2018-12-06 RX ADMIN — CEFOXITIN SODIUM 1 G: 1 POWDER, FOR SOLUTION INTRAVENOUS at 13:11

## 2018-12-06 RX ADMIN — DULOXETINE HYDROCHLORIDE 30 MG: 30 CAPSULE, DELAYED RELEASE ORAL at 11:09

## 2018-12-06 RX ADMIN — HYDROCODONE BITARTRATE AND ACETAMINOPHEN 1 TABLET: 5; 325 TABLET ORAL at 18:02

## 2018-12-06 RX ADMIN — CEFOXITIN SODIUM 1 G: 1 POWDER, FOR SOLUTION INTRAVENOUS at 19:49

## 2018-12-06 RX ADMIN — HYDROCODONE BITARTRATE AND ACETAMINOPHEN 2 TABLET: 5; 325 TABLET ORAL at 06:36

## 2018-12-06 RX ADMIN — ATENOLOL 12.5 MG: 25 TABLET ORAL at 11:08

## 2018-12-06 RX ADMIN — PANTOPRAZOLE SODIUM 40 MG: 40 INJECTION, POWDER, FOR SOLUTION INTRAVENOUS at 11:10

## 2018-12-06 RX ADMIN — SODIUM CHLORIDE: 9 INJECTION, SOLUTION INTRAVENOUS at 02:00

## 2018-12-06 RX ADMIN — ENOXAPARIN SODIUM 40 MG: 40 INJECTION SUBCUTANEOUS at 11:09

## 2018-12-06 RX ADMIN — ENOXAPARIN SODIUM 40 MG: 40 INJECTION SUBCUTANEOUS at 19:51

## 2018-12-06 RX ADMIN — Medication 3 MG: at 19:52

## 2018-12-06 RX ADMIN — POLYETHYLENE GLYCOL 3350 17 G: 17 POWDER, FOR SOLUTION ORAL at 11:09

## 2018-12-06 RX ADMIN — SODIUM CHLORIDE: 9 INJECTION, SOLUTION INTRAVENOUS at 15:09

## 2018-12-06 RX ADMIN — Medication 10 ML: at 11:10

## 2018-12-06 RX ADMIN — ROPINIROLE HYDROCHLORIDE 0.5 MG: 0.5 TABLET, FILM COATED ORAL at 19:50

## 2018-12-06 RX ADMIN — CEFOXITIN SODIUM 1 G: 1 POWDER, FOR SOLUTION INTRAVENOUS at 00:30

## 2018-12-06 RX ADMIN — ATORVASTATIN CALCIUM 10 MG: 10 TABLET, FILM COATED ORAL at 11:08

## 2018-12-06 ASSESSMENT — PAIN SCALES - GENERAL
PAINLEVEL_OUTOF10: 0
PAINLEVEL_OUTOF10: 0
PAINLEVEL_OUTOF10: 4
PAINLEVEL_OUTOF10: 0
PAINLEVEL_OUTOF10: 7
PAINLEVEL_OUTOF10: 0
PAINLEVEL_OUTOF10: 4
PAINLEVEL_OUTOF10: 0
PAINLEVEL_OUTOF10: 4

## 2018-12-06 NOTE — CARE COORDINATION
12/6/18, 7:31 AM      Clover Horner       Admitted from: PACU 12/5/2018/ [address] day: 1   Location: [facility]24/024-A Reason for admit: Hiatal hernia [K44.9]  Hiatal hernia [K44.9] Status: IP  Admit order signed?: yes  PMH:  has a past medical history of Cancer ([facility] [address] SCOTT (dyspnea on exertion); GERD (gastroesophageal reflux disease); Headache; Hyperglycemia; Hyperlipidemia; Migraine headache; Morbid obesity ([address] Osteopenia; RLS (restless legs syndrome); and UTI (urinary tract infection). Procedure: 12/5/2018 PROCEDURE:  Robotic-assisted laparoscopic repair of      Esophageal hiatal hernia with  Ventrio ST absorbable mesh and Nissen fundoplication.     Pertinent abnormal Imaging:none  Medications:  Scheduled Meds:   atenolol  12.5 mg Oral Daily    DULoxetine  30 mg Oral Daily    atorvastatin  10 mg Oral Daily    melatonin  3 mg Oral Nightly    rOPINIRole  0.5 mg Oral Nightly    sodium chloride flush  10 mL Intravenous 2 times per day    enoxaparin  40 mg Subcutaneous Daily    pantoprazole  40 mg Intravenous Daily    And    sodium chloride (PF)  10 mL Intravenous Daily    cefOXitin  1 g Intravenous Q6H    insulin lispro  0-6 Units Subcutaneous TID WC    insulin lispro  0-3 Units Subcutaneous Nightly     Continuous Infusions:   sodium chloride 100 mL/hr at 12/06/18 0200    dextrose        Pertinent Info/Orders/Treatment Plan:  IV fluids, diabetes management, pain control. Diet: Diet NPO Effective Now Exceptions are: Sips with Meds, Ice Chips, Popsicles   Smoking status:  reports that she has never smoked.  She has never used smokeless tobacco.   PCP: TIMOTHY Lawson - CNP  Readmission: no  Readmission Risk Score: 7%    Discharge Planning  Current Residence:  Private Residence  Living Arrangements:  Spouse/Significant Other   Support Systems:  Spouse/Significant Other, Children, Family Members  Current Services PTA:     Potential Assistance Needed:  N/A  Potential Assistance Purchasing Medications:  No  Does patient want to participate in local refill/ meds to beds program?  Yes  Type of Home Care Services:  None  Patient expects to be discharged to:  Home with   Expected Discharge date:  12/06/18  Follow Up Appointment: Best Day/ Time: Tuesday AM    Discharge Plan: Met with Isabel Mcneal. She currently lives at home with her . Plan is to return home at discharge. Denies need for DME or HH.      Evaluation: no

## 2018-12-06 NOTE — PLAN OF CARE
Problem: Pain:  Goal: Pain level will decrease  Pain level will decrease   Outcome: Ongoing  Patient has PRN pain medications if needed    Problem: Safety:  Goal: Free from accidental physical injury  Free from accidental physical injury   Outcome: Ongoing  Fall interventions in place    Problem: Skin Integrity:  Goal: Skin integrity will stabilize  Skin integrity will stabilize   Outcome: Ongoing  Pt with 6 incision sites that are glued, well approximated     Problem: Discharge Planning:  Goal: Patients continuum of care needs are met  Patients continuum of care needs are met   Outcome: Ongoing  Pt plans to discharge home with     Comments: Care plan reviewed with patient. Patient verbalize understanding of the plan of care and contribute to goal setting.

## 2018-12-07 VITALS
HEART RATE: 81 BPM | OXYGEN SATURATION: 97 % | DIASTOLIC BLOOD PRESSURE: 66 MMHG | BODY MASS INDEX: 44.41 KG/M2 | SYSTOLIC BLOOD PRESSURE: 144 MMHG | RESPIRATION RATE: 17 BRPM | TEMPERATURE: 98.2 F | HEIGHT: 68 IN | WEIGHT: 293 LBS

## 2018-12-07 LAB
GLUCOSE BLD-MCNC: 123 MG/DL (ref 70–108)
GLUCOSE BLD-MCNC: 129 MG/DL (ref 70–108)

## 2018-12-07 PROCEDURE — 6370000000 HC RX 637 (ALT 250 FOR IP): Performed by: SURGERY

## 2018-12-07 PROCEDURE — 2580000003 HC RX 258: Performed by: SURGERY

## 2018-12-07 PROCEDURE — 82948 REAGENT STRIP/BLOOD GLUCOSE: CPT

## 2018-12-07 PROCEDURE — C9113 INJ PANTOPRAZOLE SODIUM, VIA: HCPCS | Performed by: SURGERY

## 2018-12-07 PROCEDURE — 99024 POSTOP FOLLOW-UP VISIT: CPT | Performed by: SURGERY

## 2018-12-07 PROCEDURE — 6360000002 HC RX W HCPCS: Performed by: SURGERY

## 2018-12-07 RX ORDER — HYDROCODONE BITARTRATE AND ACETAMINOPHEN 5; 325 MG/1; MG/1
1 TABLET ORAL EVERY 4 HOURS PRN
Qty: 32 TABLET | Refills: 0 | Status: SHIPPED | OUTPATIENT
Start: 2018-12-07 | End: 2018-12-10

## 2018-12-07 RX ADMIN — POLYETHYLENE GLYCOL 3350 17 G: 17 POWDER, FOR SOLUTION ORAL at 08:06

## 2018-12-07 RX ADMIN — DULOXETINE HYDROCHLORIDE 30 MG: 30 CAPSULE, DELAYED RELEASE ORAL at 08:06

## 2018-12-07 RX ADMIN — Medication 10 ML: at 08:08

## 2018-12-07 RX ADMIN — HYDROCODONE BITARTRATE AND ACETAMINOPHEN 1 TABLET: 5; 325 TABLET ORAL at 00:48

## 2018-12-07 RX ADMIN — CEFOXITIN SODIUM 1 G: 1 POWDER, FOR SOLUTION INTRAVENOUS at 06:36

## 2018-12-07 RX ADMIN — ATORVASTATIN CALCIUM 10 MG: 10 TABLET, FILM COATED ORAL at 08:06

## 2018-12-07 RX ADMIN — ENOXAPARIN SODIUM 40 MG: 40 INJECTION SUBCUTANEOUS at 08:06

## 2018-12-07 RX ADMIN — CEFOXITIN SODIUM 1 G: 1 POWDER, FOR SOLUTION INTRAVENOUS at 00:55

## 2018-12-07 RX ADMIN — Medication 10 ML: at 08:07

## 2018-12-07 RX ADMIN — ATENOLOL 12.5 MG: 25 TABLET ORAL at 08:06

## 2018-12-07 RX ADMIN — PANTOPRAZOLE SODIUM 40 MG: 40 INJECTION, POWDER, FOR SOLUTION INTRAVENOUS at 08:07

## 2018-12-07 RX ADMIN — SODIUM CHLORIDE: 9 INJECTION, SOLUTION INTRAVENOUS at 05:26

## 2018-12-07 ASSESSMENT — PAIN SCALES - GENERAL
PAINLEVEL_OUTOF10: 0
PAINLEVEL_OUTOF10: 0
PAINLEVEL_OUTOF10: 4

## 2018-12-07 ASSESSMENT — PAIN DESCRIPTION - LOCATION: LOCATION: ABDOMEN

## 2018-12-07 ASSESSMENT — PAIN DESCRIPTION - PAIN TYPE: TYPE: SURGICAL PAIN

## 2018-12-07 ASSESSMENT — PAIN DESCRIPTION - DESCRIPTORS: DESCRIPTORS: ACHING;DISCOMFORT

## 2018-12-07 NOTE — CARE COORDINATION
12/7/18, 2:00 PM    Discharge plan discussed by  and . Discharge plan reviewed with patient/ family. Patient/ family verbalize understanding of discharge plan and are in agreement with plan. Understanding was demonstrated using the teach back method. Patient planning home with . Denies discharge needs.         IMM Letter  IMM Letter date given[de-identified] 12/06/18

## 2018-12-07 NOTE — PLAN OF CARE
Problem: Pain:  Goal: Pain level will decrease  Pain level will decrease   Outcome: Met This Shift  Patient with pain this shift. Pain medication given per MAR. Non-pharmacological pain mgmt - reposition, rest, distraction, elevation, emotional support, ice provided. Pain goal: no pain. Patient satisfied with pain mgmt. Will continue to monitor for pain. Problem: Safety:  Goal: Free from accidental physical injury  Free from accidental physical injury   Outcome: Met This Shift  Patient remained free from accidental physical injury this shift. Used call light appropriately. Wore nonskid socks when ambulating with assistance. Bed alarm on. Pathway clear. Goal: Free from intentional harm  Free from intentional harm   Outcome: Met This Shift  Patient remained free from intentional harm this shift. Denies feelings of self harm. Problem: Skin Integrity:  Goal: Skin integrity will stabilize  Skin integrity will stabilize   Outcome: Met This Shift  Patient with no skin breakdown noted. Patient with 6 surgical incisions from abdominal surgery. Closed with surgical glue, open to air. No redness noted. Healing well. Patient turns self in bed. Pillow support provided. Will continue to monitor skin integrity and encourage repositioning Q2H and PRN to prevent skin breakdown. Problem: Discharge Planning:  Goal: Patients continuum of care needs are met  Patients continuum of care needs are met   Outcome: Ongoing  Patient from home with family. Plans to return home with family at discharge. Possible discharge 1-2 days. Will continue to monitor for discharge needs. Problem: Bowel/Gastric:  Goal: Ability to achieve a regular elimination pattern will improve  Ability to achieve a regular elimination pattern will improve   Outcome: Ongoing  Patient denies having BM this shift. Bowel sounds active. Patient denies passing any gas. Denies nausea or vomiting. Tolerating a full liquid diet.  Patient ambulated in room this

## 2018-12-07 NOTE — DISCHARGE SUMMARY
tablet  Take 500 mg by mouth daily (with breakfast)             naproxen (NAPROSYN) 500 MG tablet  Take 500 mg by mouth as needed for Pain             pantoprazole (PROTONIX) 40 MG tablet  Take 1 tablet by mouth daily 30-60\" before heaviest meal on an empty stomach             rOPINIRole (REQUIP) 0.5 MG tablet  Take 0.5 mg by mouth nightly. SUMAtriptan (IMITREX) 50 MG tablet  Take 50 mg by mouth once as needed for Migraine                 Patient Instructions:    Discharge lab work: None  Activity: no heavy lifting for 12 weeks  Diet: DIET FULL LIQUID;    Code Status: Full Code    Follow-up visits:   José Miguel Garcia, APRN - CNP  [id number] [address]          Karri Gomez MD  [address] [facility].  [place] [phone]    Schedule an appointment as soon as possible for a visit in 2 weeks         Procedures: Robotic-assisted laparoscopic repair of paraesophageal hernia with mesh and Nissen fundoplication    Consults:   None    Examination:  Vitals:  Vitals:    12/06/18 1945 12/07/18 0045 12/07/18 0345 12/07/18 0759   BP: (!) 158/69 (!) 140/63 (!) 166/77 (!) 162/70   Pulse: 77 68 84 83   Resp: 18 18 18 18   Temp: 98.3 °F (36.8 °C) 97.8 °F (36.6 °C) 97.7 °F (36.5 °C) 97.8 °F (36.6 °C)   TempSrc: Oral Oral Oral Oral   SpO2: 95% 92% 93% 95%   Weight:   (!) 326 lb 9.6 oz (148.1 kg)    Height:         Weight: Weight: (!) 326 lb 9.6 oz (148.1 kg)     24 hour intake/output:  Intake/Output Summary (Last 24 hours) at 12/07/18 1211  Last data filed at 12/07/18 5176   Gross per 24 hour   Intake           2426.5 ml   Output                0 ml   Net           2426.5 ml       General appearance - alert, well appearing, and in no distress  Chest - clear to auscultation, no wheezes, rales or rhonchi, symmetric air entry  Heart - normal rate and regular rhythm  Abdomen - soft, nontender, nondistended, no masses or organomegaly  Expected incisional tenderness  Obese:

## 2018-12-09 DIAGNOSIS — Z98.890 S/P NISSEN FUNDOPLICATION (WITHOUT GASTROSTOMY TUBE) PROCEDURE: Primary | ICD-10-CM

## 2018-12-09 RX ORDER — HYDROCODONE BITARTRATE AND ACETAMINOPHEN 5; 325 MG/1; MG/1
1 TABLET ORAL EVERY 4 HOURS PRN
Qty: 32 TABLET | Refills: 0 | Status: SHIPPED | OUTPATIENT
Start: 2018-12-09 | End: 2018-12-12

## 2018-12-20 ENCOUNTER — OFFICE VISIT (OUTPATIENT)
Dept: SURGERY | Age: 68
End: 2018-12-20

## 2018-12-20 VITALS
HEIGHT: 68 IN | BODY MASS INDEX: 44.41 KG/M2 | HEART RATE: 94 BPM | RESPIRATION RATE: 18 BRPM | SYSTOLIC BLOOD PRESSURE: 136 MMHG | WEIGHT: 293 LBS | DIASTOLIC BLOOD PRESSURE: 82 MMHG | OXYGEN SATURATION: 97 % | TEMPERATURE: 98.3 F

## 2018-12-20 DIAGNOSIS — E66.01 MORBID OBESITY (HCC): ICD-10-CM

## 2018-12-20 DIAGNOSIS — K44.9 HIATAL HERNIA: ICD-10-CM

## 2018-12-20 DIAGNOSIS — K21.9 GASTROESOPHAGEAL REFLUX DISEASE, ESOPHAGITIS PRESENCE NOT SPECIFIED: ICD-10-CM

## 2018-12-20 DIAGNOSIS — Z98.890 POSTOPERATIVE STATE: ICD-10-CM

## 2018-12-20 DIAGNOSIS — Z98.890 S/P NISSEN FUNDOPLICATION (WITHOUT GASTROSTOMY TUBE) PROCEDURE: Primary | ICD-10-CM

## 2018-12-20 PROCEDURE — 99024 POSTOP FOLLOW-UP VISIT: CPT | Performed by: SURGERY

## 2018-12-20 NOTE — PROGRESS NOTES
by mouth daily, Disp: 45 tablet, Rfl: 3    metFORMIN (GLUCOPHAGE) 500 MG tablet, Take 500 mg by mouth daily (with breakfast), Disp: , Rfl:     DULoxetine (CYMBALTA) 30 MG extended release capsule, Take 30 mg by mouth daily, Disp: , Rfl:     SUMAtriptan (IMITREX) 50 MG tablet, Take 50 mg by mouth once as needed for Migraine, Disp: , Rfl:     naproxen (NAPROSYN) 500 MG tablet, Take 500 mg by mouth as needed for Pain, Disp: , Rfl:     alendronate (FOSAMAX) 35 MG tablet, Take 35 mg by mouth every 7 days , Disp: , Rfl:     atorvastatin (LIPITOR) 10 MG tablet, Take 10 mg by mouth daily. , Disp: , Rfl:     rOPINIRole (REQUIP) 0.5 MG tablet, Take 0.5 mg by mouth nightly., Disp: , Rfl:     Allergies  No Known Allergies    Review of Systems  History obtained from the patient. Constitutional: Denies any fever, chills, fatigue. Wound: Denies any rash, skin color changes or wound problems. Resp: Denies any cough, shortness of breath. CV: Denies any chest pain, orthopnea or syncope. GI: Positive for incisional discomfort only. Denies any nausea, vomiting, blood in the stool, constipation or diarrhea. : Denies any hematuria, hesitancy or dysuria. OBJECTIVE     VITALS: /82 (Site: Left Lower Arm, Position: Sitting, Cuff Size: Medium Adult)   Pulse 94   Temp 98.3 °F (36.8 °C) (Tympanic)   Resp 18   Ht 5' 8\" (1.727 m)   Wt (!) 324 lb (147 kg)   SpO2 97%   BMI 49.26 kg/m²     CONSTITUTIONAL: Alert and oriented times 3, no acute distress and cooperative to examination. SKIN: Skin color, texture, turgor normal. No rashes or lesions. INCISION: wound margins intact and healing well. No signs of infection. No drainage. LUNGS: Lungs Clear  CARDIOVASCULAR: Normal Rate  ABDOMEN: soft, nontender, nondistended, no masses or organomegaly  Incisional tenderness only  NEUROLOGIC: No gross deficits.

## 2019-01-08 ENCOUNTER — HOSPITAL ENCOUNTER (OUTPATIENT)
Age: 69
Discharge: HOME | End: 2019-01-08
Payer: MEDICARE

## 2019-01-08 LAB
AVERAGE GLUCOSE: 132 MG/DL (ref 70–126)
HBA1C MFR BLD: 6.4 % (ref 4.4–6.4)

## 2019-01-08 PROCEDURE — 36415 COLL VENOUS BLD VENIPUNCTURE: CPT

## 2019-01-08 PROCEDURE — 83036 HEMOGLOBIN GLYCOSYLATED A1C: CPT

## 2019-04-29 RX ORDER — ATENOLOL 25 MG/1
TABLET ORAL
Qty: 45 TABLET | Refills: 3 | Status: SHIPPED | OUTPATIENT
Start: 2019-04-29 | End: 2020-07-13 | Stop reason: SDUPTHER

## 2019-05-20 ENCOUNTER — OFFICE VISIT (OUTPATIENT)
Dept: SURGERY | Age: 69
End: 2019-05-20
Payer: MEDICARE

## 2019-05-20 ENCOUNTER — HOSPITAL ENCOUNTER (OUTPATIENT)
Dept: WOMENS IMAGING | Age: 69
Discharge: HOME | End: 2019-05-20
Payer: MEDICARE

## 2019-05-20 VITALS
TEMPERATURE: 97 F | BODY MASS INDEX: 44.41 KG/M2 | WEIGHT: 293 LBS | SYSTOLIC BLOOD PRESSURE: 120 MMHG | OXYGEN SATURATION: 96 % | DIASTOLIC BLOOD PRESSURE: 66 MMHG | HEART RATE: 85 BPM | HEIGHT: 68 IN | RESPIRATION RATE: 18 BRPM

## 2019-05-20 DIAGNOSIS — Z98.890 S/P NISSEN FUNDOPLICATION (WITHOUT GASTROSTOMY TUBE) PROCEDURE: ICD-10-CM

## 2019-05-20 DIAGNOSIS — Z12.31 VISIT FOR SCREENING MAMMOGRAM: ICD-10-CM

## 2019-05-20 DIAGNOSIS — K44.9 HIATAL HERNIA: Primary | ICD-10-CM

## 2019-05-20 DIAGNOSIS — E66.01 MORBID OBESITY (HCC): ICD-10-CM

## 2019-05-20 PROCEDURE — 77063 BREAST TOMOSYNTHESIS BI: CPT

## 2019-05-20 PROCEDURE — 99213 OFFICE O/P EST LOW 20 MIN: CPT | Performed by: SURGERY

## 2019-05-20 NOTE — PROGRESS NOTES
Terell Bowen MD   General Surgery  Follow up Patient Evaluation in Office  Pt Name: Soheila Zaldivar  Date of Birth 1950   Today's Date: 5/20/2019  Medical Record Number: [id number]  Referring Provider: No ref. provider found  Primary Care Provider: TIMOTHY Mart CNP  Chief Complaint:  Chief Complaint   Patient presents with    Follow-up     5 month f/u--s/p Robotic-assisted laparoscopic repair of  Esophageal hiatal hernia with mesh and Nissen fundoplication 63/0/93       ASSESSMENT      1. Hiatal hernia    2. S/P Nissen fundoplication (without gastrostomy tube) procedure    3. Morbid obesity ([address]    4. Reflux symptoms resolved     PLANS      1. Return to surgical clinic as needed. 2.  [facility] She will inquire if she still needs to take her Protonix from their standpoint. Patrick Brunner is a 71y.o. year old female who is presenting today in the office for follow-up evaluation after robotic-assisted laparoscopic hiatal hernia repair and Nissen fundoplication for reflux disease. She is doing well. Her incisions are all well-healed. She has no reflux symptoms whatsoever at the present time. She has been continuing her proton except present. She has no dysphagia and no nausea or vomiting. She denies any change in bowel habits. Past Medical History  Past Medical History:   Diagnosis Date    Cancer ([address]     skin    SCOTT (dyspnea on exertion)     false positive stress test 2014 with normal cardiac cath 2014.     GERD (gastroesophageal reflux disease)     Headache     Migraines    Hyperglycemia 2015    borderline takes Metformin    Hyperlipidemia     Migraine headache     Morbid obesity (HCC)     Osteopenia     RLS (restless legs syndrome)     UTI (urinary tract infection)     history of       Past Surgical History  Past Surgical History:   Procedure Laterality Date    BUNIONECTOMY Right 1980's    CARPAL TUNNEL RELEASE Bilateral 1990 2008 1991 2016     x2 right x2 left    [facility]OS[facility]  2017    [facility]    GASTRIC FUNDOPLICATION N/A 34/9/1912    ROBOTIC HIATAL HERNIA WITH NISSEN FUNDOPLICATION performed by Juma Cadena [facility] at [facility] Left 02/2015    HYSTERECTOMY  1980    JOINT REPLACEMENT Right     KNEE ARTHROPLASTY Left 2007    KNEE ARTHROPLASTY Right 2014    UPPER GASTROINTESTINAL ENDOSCOPY      UPPER GASTROINTESTINAL ENDOSCOPY N/A 11/13/2018    EGD DIAGNOSTIC ONLY performed by Kyrie Roberts MD at [facility]       Medications  Current Outpatient Medications   Medication Sig Dispense Refill    atenolol (TENORMIN) 25 MG tablet take 1/2 tablet by mouth once daily 45 tablet 3    linagliptin (TRADJENTA) 5 MG tablet Take 5 mg by mouth daily      pantoprazole (PROTONIX) 40 MG tablet Take 1 tablet by mouth daily 30-60\" before heaviest meal on an empty stomach 30 tablet 3    metFORMIN (GLUCOPHAGE) 500 MG tablet Take 500 mg by mouth daily (with breakfast)      DULoxetine (CYMBALTA) 30 MG extended release capsule Take 30 mg by mouth daily      SUMAtriptan (IMITREX) 50 MG tablet Take 50 mg by mouth once as needed for Migraine      naproxen (NAPROSYN) 500 MG tablet Take 500 mg by mouth as needed for Pain      alendronate (FOSAMAX) 35 MG tablet Take 35 mg by mouth every 7 days       atorvastatin (LIPITOR) 10 MG tablet Take 10 mg by mouth daily.  rOPINIRole (REQUIP) 0.5 MG tablet Take 0.5 mg by mouth nightly. No current facility-administered medications for this visit.       Allergies   No Known Allergies    Family History  Family History   Problem Relation Age of Onset    Arthritis Mother     High Blood Pressure Mother     Cancer Sister         ovarian    Cancer Maternal Grandmother         breast    Arthritis Maternal Grandmother     Cancer Maternal Grandfather         colon    Arthritis Maternal Grandfather        SocialHistory  Social History     Socioeconomic History    Marital Negative for gait problem, joint swelling and myalgias. Skin: Negative for color change and rash. Allergic/Immunologic: Negative for immunocompromised state. Neurological: Negative for dizziness, tremors, seizures and speech difficulty. Hematological: Does not bruise/bleed easily. Psychiatric/Behavioral: Negative for behavioral problems, confusion and suicidal ideas. OBJECTIVE     /66 (Site: Left Lower Arm, Position: Sitting, Cuff Size: Medium Adult)   Pulse 85   Temp 97 °F (36.1 °C) (Tympanic)   Resp 18   Ht 5' 8\" (1.727 m)   Wt (!) 320 lb (145.2 kg)   SpO2 96%   Breastfeeding? No   BMI 48.66 kg/m²      Physical Exam   Constitutional: She is oriented to person, place, and time. She appears well-developed and well-nourished. No distress. HENT:   Head: Normocephalic and atraumatic. Mouth/Throat: Oropharynx is clear and moist.   Eyes: Pupils are equal, round, and reactive to light. EOM are normal.   Neck: Normal range of motion. Neck supple. No JVD present. No tracheal deviation present. Cardiovascular: Normal rate and regular rhythm. Pulmonary/Chest: Breath sounds normal. No respiratory distress. She has no wheezes. Abdominal: Soft. Bowel sounds are normal. She exhibits no distension. There is no tenderness. There is no rebound and no guarding. No hernia. Musculoskeletal: Normal range of motion. She exhibits no edema. Neurological: She is alert and oriented to person, place, and time. No cranial nerve deficit. Skin: Skin is warm and dry. No rash noted. She is not diaphoretic. Psychiatric: She has a normal mood and affect. Thought content normal.   Vitals reviewed.       Lab Results   Component Value Date    WBC 12.9 (H) 12/06/2018    HGB 11.9 (L) 12/06/2018    HCT 38.0 12/06/2018     12/06/2018    CHOL 174 08/30/2018    TRIG 183 08/30/2018    HDL 40 08/30/2018    ALT 8 (L) 08/30/2018    AST 11 08/30/2018     12/06/2018    K 4.6 12/06/2018     12/06/2018    CREATININE 0.9 12/06/2018    BUN 20 12/06/2018    CO2 19 (L) 12/06/2018    TSH 1.840 08/30/2018    LABA1C 6.4 01/08/2019    LABMICR < 1.20 11/12/2015

## 2019-05-22 ASSESSMENT — ENCOUNTER SYMPTOMS
BLOOD IN STOOL: 0
COUGH: 0
WHEEZING: 0
SHORTNESS OF BREATH: 0
BACK PAIN: 1
ABDOMINAL PAIN: 0
NAUSEA: 0
COLOR CHANGE: 0
VOMITING: 0
TROUBLE SWALLOWING: 0
VOICE CHANGE: 0
SORE THROAT: 0

## 2019-06-04 ENCOUNTER — HOSPITAL ENCOUNTER (OUTPATIENT)
Age: 69
Discharge: HOME | End: 2019-06-04
Payer: MEDICARE

## 2019-06-04 LAB
ALBUMIN SERPL-MCNC: 3.9 G/DL (ref 3.5–5.1)
ALP BLD-CCNC: 76 U/L (ref 38–126)
ALT SERPL-CCNC: 10 U/L (ref 11–66)
ANION GAP SERPL CALCULATED.3IONS-SCNC: 12 MEQ/L (ref 8–16)
AST SERPL-CCNC: 12 U/L (ref 5–40)
BILIRUB SERPL-MCNC: 0.4 MG/DL (ref 0.3–1.2)
BILIRUBIN DIRECT: < 0.2 MG/DL (ref 0–0.3)
BUN BLDV-MCNC: 13 MG/DL (ref 7–22)
C-REACTIVE PROTEIN: 0.39 MG/DL (ref 0–1)
CALCIUM SERPL-MCNC: 9.3 MG/DL (ref 8.5–10.5)
CHLORIDE BLD-SCNC: 102 MEQ/L (ref 98–111)
CO2: 26 MEQ/L (ref 23–33)
CREAT SERPL-MCNC: 0.8 MG/DL (ref 0.4–1.2)
ERYTHROCYTE [DISTWIDTH] IN BLOOD BY AUTOMATED COUNT: 14 % (ref 11.5–14.5)
ERYTHROCYTE [DISTWIDTH] IN BLOOD BY AUTOMATED COUNT: 45 FL (ref 35–45)
GFR SERPL CREATININE-BSD FRML MDRD: 71 ML/MIN/1.73M2
GLUCOSE BLD-MCNC: 132 MG/DL (ref 70–108)
HCT VFR BLD CALC: 38.9 % (ref 37–47)
HEMOGLOBIN: 12.6 GM/DL (ref 12–16)
MCH RBC QN AUTO: 28.5 PG (ref 26–33)
MCHC RBC AUTO-ENTMCNC: 32.4 GM/DL (ref 32.2–35.5)
MCV RBC AUTO: 88 FL (ref 81–99)
PLATELET # BLD: 232 THOU/MM3 (ref 130–400)
PMV BLD AUTO: 9.7 FL (ref 9.4–12.4)
POTASSIUM SERPL-SCNC: 4.6 MEQ/L (ref 3.5–5.2)
RBC # BLD: 4.42 MILL/MM3 (ref 4.2–5.4)
SODIUM BLD-SCNC: 140 MEQ/L (ref 135–145)
TOTAL PROTEIN: 7 G/DL (ref 6.1–8)
WBC # BLD: 6.7 THOU/MM3 (ref 4.8–10.8)

## 2019-06-04 PROCEDURE — 82784 ASSAY IGA/IGD/IGG/IGM EACH: CPT

## 2019-06-04 PROCEDURE — 36415 COLL VENOUS BLD VENIPUNCTURE: CPT

## 2019-06-04 PROCEDURE — 83516 IMMUNOASSAY NONANTIBODY: CPT

## 2019-06-04 PROCEDURE — 82248 BILIRUBIN DIRECT: CPT

## 2019-06-04 PROCEDURE — 80053 COMPREHEN METABOLIC PANEL: CPT

## 2019-06-04 PROCEDURE — 85027 COMPLETE CBC AUTOMATED: CPT

## 2019-06-04 PROCEDURE — 86140 C-REACTIVE PROTEIN: CPT

## 2019-06-04 NOTE — PROGRESS NOTES
[place] [facility] [address] Initial Consultation    David Krause                                             Chief Complaint:  Marisabel Esquivel is here for a sleep consult ref by Lorin Negro for trouble sleeping, some snoring. No prior sleep studies. Chief complaint: David Krause is a 71 y.o.oldfemale came for further evaluation regarding her insomnia and ?sleep apnea  with referral from Ms. Lorin Negro. Solomon:    Sleep/Wake schedule:  Usual time to go to bed during the work/regular day of week: 12:00 to 1:00 AM.  Usual time to wake up during the work//regular day of week: 10:00 to 11:00 AM.  Over the weekends her sleep schedule: [x] Remain same. She usually falls a sleep in less than: 1 to 4 hours  She takes naps: Yes. Number of naps per week:  7 times. During each nap she spends a total of: ~2hours  The naps were reported as refreshing: No.     Sleep Hygiene:    Is the temperature and evironment in her bed room is acceptable to her: Yes. She watches Television in her bed room: Yes. She read books, study, pay bills etc in the bed: No.  Frequency She wake up during night/sleep: 2  Majority of nocturnal awakenings are for urination: Yes. Difficulty in falling back to sleep after nocturnal awakenings: Yes  . Do you drink decaffeinated coffee: Yes. 3 cup/s per day in the morning. Do you drink caffeinated beverages i.e sodas: Yes. 2 can/s per week. Do you drink tea:Yes. 1 to 2 cup/s/glasse/s of iced tea per day. Do you drink alcoholic beverages: Very rarely    History of recreational drug use: No.     Sleep apnea symptoms:  Noticed to have loud snoring:Yes- rarely.  Noted by her family member-   Witnessed apneas during sleep noticed: No.  History of choking and gasping sensation at night time: Yes. History of headaches in the morning:Yes. History of dry mouth in the morning: Yes. History of palpitations during night time/nocturnal awakenings: No.  History of sweating during night time/nocturnal awakenings: Yes    General:  History of head injury in the past: No.    Associated loss of consciousness with head injury: No.  History of seizures: No.   Rest less legs syndrome symptoms:Yes. She is currently taking 0.5mg PO Qhs.   History suggestive of periodic limb movements during sleep: NO  History suggestive of hypnagogic hallucinations: NO  History suggestive of hypnopompic hallucinations: NO  History suggestive of sleep talking:NO  History suggestive of sleep walking:NO  History suggestive of bruxism: No.    History suggestive of cataplexy: NO  History suggestive of sleep paralysis: NO    Family history of sleep disorders:  Family history of obstructive sleep apnea: No.   Family history of Narcolepsy: No.   Family history of Rest less legs syndrome : Yes. Family member diagnosed with Restless legs syndrome Cousin    History regarding old sleep studies:  Prior history of sleep study: No.  Using CPAP device: No.  Currently using home Oxygen: NO.      Patient considerations:  Is the patient is ambulatory: Yes  Patient is currently using: None of these Wheelchair, Klaudia Sidle or Verba Buchanan. Para/Quadriplegic: NO  Hearing deficit : NO  Claustrophobic: NO  MDD : NO  Blind: NO  Incontinent: NO  Para/Quadraplegi: NO.   Need transportation to and from Sleep Center:NO      Social History:  Social History     Tobacco Use    Smoking status: Never Smoker    Smokeless tobacco: Never Used   Substance Use Topics    Alcohol use: No    Drug use: No   .  She is currently working: No.       [x] Retired. Past Medical History:   Diagnosis Date    Cancer ([address]     skin    SCOTT (dyspnea on exertion)     false positive stress test 2014 with normal cardiac cath 2014.     GERD (gastroesophageal reflux disease)     Headache     Migraines    Hyperglycemia 2015    borderline takes Metformin    Hyperlipidemia     Migraine headache     Morbid obesity (HCC)     Osteopenia     RLS (restless legs syndrome)     UTI (urinary tract infection)     history of       Past Surgical History:   Procedure Laterality Date    BUNIONECTOMY Right 1980's    CARPAL TUNNEL RELEASE Bilateral 1990 2008 12 2016     x2 right x2 left    [facility]OS[facility]  [facility]    GASTRIC FUNDOPLICATION N/A 55/2/0327    ROBOTIC HIATAL HERNIA WITH NISSEN FUNDOPLICATION performed by Jen Owens MD at [facility] Left 02/2015   [address]    JOINT REPLACEMENT Right     KNEE ARTHROPLASTY Left 2007    KNEE ARTHROPLASTY Right 2014    UPPER GASTROINTESTINAL ENDOSCOPY      UPPER GASTROINTESTINAL ENDOSCOPY N/A 11/13/2018    EGD DIAGNOSTIC ONLY performed by Lizzie Schmidt MD at [facility]       No Known Allergies    Current Outpatient Medications   Medication Sig Dispense Refill    DICYCLOMINE HCL PO Take by mouth 3 times daily      atenolol (TENORMIN) 25 MG tablet take 1/2 tablet by mouth once daily 45 tablet 3    linagliptin (TRADJENTA) 5 MG tablet Take 5 mg by mouth daily      pantoprazole (PROTONIX) 40 MG tablet Take 1 tablet by mouth daily 30-60\" before heaviest meal on an empty stomach 30 tablet 3    metFORMIN (GLUCOPHAGE) 500 MG tablet Take 500 mg by mouth daily (with breakfast)      DULoxetine (CYMBALTA) 30 MG extended release capsule Take 30 mg by mouth daily      SUMAtriptan (IMITREX) 50 MG tablet Take 50 mg by mouth once as needed for Migraine      naproxen (NAPROSYN) 500 MG tablet Take 500 mg by mouth as needed for Pain      alendronate (FOSAMAX) 35 MG tablet Take 35 mg by mouth every 7 days       atorvastatin (LIPITOR) 10 MG tablet Take 10 mg by mouth daily.  rOPINIRole (REQUIP) 0.5 MG tablet Take 0.5 mg by mouth nightly.        No current facility-administered medications for this visit. Family History   Problem Relation Age of Onset    Arthritis Mother     High Blood Pressure Mother     Cancer Sister         ovarian    Cancer Maternal Grandmother         breast    Arthritis Maternal Grandmother     Cancer Maternal Grandfather         colon    Arthritis Maternal Grandfather         Review of Systems:   General/Constitutional: She gained ~30lbs weight in the last 1year with normal appetite. No fever or chills. HENT: Negative. Eyes: Negative. Upper respiratory tract: No nasal stuffiness or post nasal drip. Lower respiratory tract/ lungs: Occasional cough with no sputum production. No hemoptysis. Cardiovascular: No palpitations or chest pain. Gastrointestinal: No nausea or vomiting. Neurological: No focal neurologiacal weakness. Extremities: No edema. Musculoskeletal: No complaints. Genitourinary: No complaints. Hematological: Negative. Psychiatric/Behavioral: Negative. Skin: No itching. /72 (Site: Left Lower Arm, Position: Sitting, Cuff Size: Large Adult)   Pulse 78   Ht 5' 8\" (1.727 m)   Wt (!) 327 lb 9.6 oz (148.6 kg)   SpO2 98% Comment: on RA  BMI 49.81 kg/m²   Mallampati airway Class:  4  Neck Circumference:  15 Inches  [place] sleepiness score 6/5/19:  10  SAQLI 53    Physical Exam   Nursing note and vitals reviewed. Constitutional: Patient appears well built and wellnourished. No distress. Patient is oriented to person, place, and time. HENT:   Head: Normocephalic and atraumatic. Right Ear: External ear normal.   Left Ear: External ear normal.   Mouth/Throat: Oropharynx is clear and moist.  No oral thrush. Dentures present in both upper and lower jaw. Eyes: Conjunctivae are normal. Pupils are equal, round, and reactive to light. No scleral icterus. Neck: Neck supple. No JVD present. No tracheal deviation present. Cardiovascular: Normal rate, regular rhythm, normal heart sounds.  No murmur heard.   Pulmonary/Chest: Effort normal and breath sounds normal. No stridor. No respiratory distress. No wheezes. No rales. Patient exhibits no tenderness. Abdominal: Soft. Patient exhibits no distension. No tenderness. Musculoskeletal: Normal range of motion. Extremities: Patient exhibits no edema and no tenderness. Lymphadenopathy:  No cervical adenopathy. Neurological: Patient is alert and oriented to person, place, and time. Skin: Skin is warm and dry. Patient is not diaphoretic. Psychiatric: Patient  has a normal mood and affect. Patient behavior is normal.     Diagnostic Data:  None related sleep.     6/4/2019  9:58 AM - [facility] Incoming Lab Results From Soft     Component Value Ref Range & Units Status Collected Lab   WBC 6.7  4.8 - 10.8 thou/mm3 Final 06/04/2019  9:37 AM  - [facility]   RBC 4.42  4.20 - 5.40 mill/mm3 Final 06/04/2019  9:37 AM  - [facility]   Hemoglobin 12.6  12.0 - 16.0 gm/dl Final 06/04/2019  9:37 AM [facility] - [facility]   Hematocrit 38.9  37.0 - 47.0 % Final 06/04/2019  9:37 AM [facility]   MCV 88.0  81.0 - 99.0 fL Final 06/04/2019  9:37 AM [facility]   MCH 28.5  26.0 - 33.0 pg Final 06/04/2019  9:37 AM [facility]   MCHC 32.4  32.2 - 35.5 gm/dl Final 06/04/2019  9:37 AM [facility]   RDW-CV 14.0  11.5 - 14.5 % Final 06/04/2019  9:37 AM [facility]   RDW-SD 45.0  35.0 - 45.0 fL Final 06/04/2019  9:37 AM  - [address] Lab   Platelets 372  952 - 400 thou/mm3 Final 06/04/2019  9:37 AM [facility]   MPV 9.7  9.4 - 12.4 fL Final       6/4/2019 10:22 AM - [facility] Incoming Lab Results From Soft     Component Value Ref Range & Units Status Collected Lab   Alb 3.9  3.5 - 5.1 g/dL Final 06/04/2019  9:37 AM [facility]   Total Bilirubin 0.4  0.3 - 1.2 mg/dL Final 06/04/2019  9:37 AM [facility]   Bilirubin, Direct <0.2  0.0 - 0.3 mg/dL Final 06/04/2019 detailed about mechanism of action of drug along with associated side effects. She agreed to take the risk and medication. She verbalizes understanding.  -Ashley Amaya was educated about Chronotherapy for Circadian sleep disorder with phase delay and mechanism. She was advised to start Chronotherapy to maintain desired sleep schedule.  -She was educated about sleep restriction therapy and advised to practice to improve her insomnia. -She was educated about stimulus control therapy and advise to practice to improve her insomnia. -Send serum ferritin level before clinic visit.   -Patient advised and instructed to call my office with in 1 week after giving blood samples to go over the above test results and management. She verbalizes understanding.  -Will schedule patient for polysomnogram in the sleep lab once her insomnia got better. .   -I had a discussion with patient regarding avialable treatment options for her sleep disorder breathing including but not limited to CPAP titration in the sleep lab Vs.Dental appliance placement with referral to a local dentist Vs other available surgical options including Uvulopalatopharyngoplasty, maxillomandibular ostomy and tracheostomy as last option. At the end of discussion, she is not decided on her   treatment if she found to have obstructive sleep apnea at this time.  -We will see Nargis Chan back in 1week after the sleep study to go over the sleep study results and further management options.  -She was educated to practice good sleep hygiene practices. She  was provided with a good sleep hygiene hand out. Benoit Otero was advised to make earlier appointment with my clinic if she develops any worsening of sleep symptoms. She verbalizes understanding.  -Lennie Schmidt was advised to not to drive any motor vehicles or operate heavy equipment until her sleep symptoms are under good control. Nargis Chan verbalizes understanding.  -She was advised to loose weight by controlling diet and doing exercise once cleared by her family physician. Aleksandar Martinez was educated about my impression and plan. She verbalizes understanding. Addendum done on 6/5/19 at 9:12 AM:  -Patient refused to have her serum Ferritin level checked. It is not covered by her medical insurance. She verbalizes understanding of consequences of her decision including non-diagnosis.

## 2019-06-05 ENCOUNTER — INITIAL CONSULT (OUTPATIENT)
Dept: PULMONOLOGY | Age: 69
End: 2019-06-05
Payer: MEDICARE

## 2019-06-05 VITALS
OXYGEN SATURATION: 98 % | HEART RATE: 78 BPM | SYSTOLIC BLOOD PRESSURE: 124 MMHG | DIASTOLIC BLOOD PRESSURE: 72 MMHG | HEIGHT: 68 IN | WEIGHT: 293 LBS | BODY MASS INDEX: 44.41 KG/M2

## 2019-06-05 DIAGNOSIS — R06.83 SNORING: ICD-10-CM

## 2019-06-05 DIAGNOSIS — G47.10 HYPERSOMNIA: ICD-10-CM

## 2019-06-05 DIAGNOSIS — G25.81 RLS (RESTLESS LEGS SYNDROME): Primary | ICD-10-CM

## 2019-06-05 DIAGNOSIS — G47.21 CIRCADIAN RHYTHM SLEEP DISORDER, DELAYED SLEEP PHASE TYPE: ICD-10-CM

## 2019-06-05 PROCEDURE — 99204 OFFICE O/P NEW MOD 45 MIN: CPT | Performed by: INTERNAL MEDICINE

## 2019-06-05 RX ORDER — LANOLIN ALCOHOL/MO/W.PET/CERES
3 CREAM (GRAM) TOPICAL NIGHTLY PRN
Qty: 30 TABLET | Refills: 11 | Status: SHIPPED | OUTPATIENT
Start: 2019-06-05 | End: 2021-09-20

## 2019-06-05 NOTE — PATIENT INSTRUCTIONS
Recommendations/Plan:  -Start patient on Melatonin 3mg PO daily at bed time PRN. She was instructed to not to drive any motor vehicles or operate heavy equipment after taking the pill until sleep symptoms are under control. She was detailed about mechanism of action of drug along with associated side effects. She agreed to take the risk and medication. She verbalizes understanding.  -Deb Torres was educated about Chronotherapy for Circadian sleep disorder with phase delay and mechanism. She was advised to start Chronotherapy to maintain desired sleep schedule.  -She was educated about sleep restriction therapy and advised to practice to improve her insomnia. -She was educated about stimulus control therapy and advise to practice to improve her insomnia. -Send serum ferritin level before clinic visit.   -Patient advised and instructed to call my office with in 1 week after giving blood samples to go over the above test results and management. She verbalizes understanding.  -Will schedule patient for polysomnogram in the sleep lab once her insomnia got better. .   -I had a discussion with patient regarding avialable treatment options for her sleep disorder breathing including but not limited to CPAP titration in the sleep lab Vs.Dental appliance placement with referral to a local dentist Vs other available surgical options including Uvulopalatopharyngoplasty, maxillomandibular ostomy and tracheostomy as last option. At the end of discussion, she is not decided on her   treatment if she found to have obstructive sleep apnea at this time.  -We will see Nargis Chan back in 1week after the sleep study to go over the sleep study results and further management options.  -She was educated to practice good sleep hygiene practices. She  was provided with a good sleep hygiene hand out. Aliya Denny was advised to make earlier appointment with my clinic if she develops any worsening of sleep symptoms.  She verbalizes understanding.  -Margy Otto was advised to not to drive any motor vehicles or operate heavy equipment until her sleep symptoms are under good control. Nargis Chan verbalizes understanding.  -She was advised to loose weight by controlling diet and doing exercise once cleared by her family physician. Handy Cline was educated about my impression and plan. She verbalizes understanding.

## 2019-06-06 LAB
GLIADIN PEPTIDE IGG, IGA: NORMAL
IGA: 164 MG/DL (ref 70–400)
TISSUE TRANSGLUTAMINASE ANTIBODY: 1 U/ML (ref 0–5)
TISSUE TRANSGLUTAMINASE IGA: 1 U/ML (ref 0–3)

## 2019-06-14 ENCOUNTER — HOSPITAL ENCOUNTER (OUTPATIENT)
Dept: SLEEP CENTER | Age: 69
Discharge: HOME | End: 2019-06-16
Payer: MEDICARE

## 2019-06-14 DIAGNOSIS — G25.81 RLS (RESTLESS LEGS SYNDROME): ICD-10-CM

## 2019-06-14 DIAGNOSIS — G47.21 CIRCADIAN RHYTHM SLEEP DISORDER, DELAYED SLEEP PHASE TYPE: ICD-10-CM

## 2019-06-14 DIAGNOSIS — G47.10 HYPERSOMNIA: ICD-10-CM

## 2019-06-14 DIAGNOSIS — R06.83 SNORING: ICD-10-CM

## 2019-06-14 PROCEDURE — 95810 POLYSOM 6/> YRS 4/> PARAM: CPT

## 2019-06-17 LAB — STATUS: NORMAL

## 2019-06-19 NOTE — PROGRESS NOTES
ROBOTIC HIATAL HERNIA WITH NISSEN FUNDOPLICATION performed by Pankaj Abdul MD at [facility] Left 02/2015    HYSTERECTOMY  1980    JOINT REPLACEMENT Right     KNEE ARTHROPLASTY Left 2007    KNEE ARTHROPLASTY Right 2014    UPPER GASTROINTESTINAL ENDOSCOPY      UPPER GASTROINTESTINAL ENDOSCOPY N/A 11/13/2018    EGD DIAGNOSTIC ONLY performed by Julee Bui MD at [facility]     Social History     Tobacco Use    Smoking status: Never Smoker    Smokeless tobacco: Never Used   Substance Use Topics    Alcohol use: No    Drug use: No     Family History   Problem Relation Age of Onset    Arthritis Mother     High Blood Pressure Mother     Cancer Sister         ovarian    Cancer Maternal Grandmother         breast    Arthritis Maternal Grandmother     Cancer Maternal Grandfather         colon    Arthritis Maternal Grandfather      Allergies  No Known Allergies  Meds  Current Outpatient Medications   Medication Sig Dispense Refill    DICYCLOMINE HCL PO Take by mouth 3 times daily      melatonin 3 MG TABS tablet Take 1 tablet by mouth nightly as needed (insomnia) 30 tablet 11    atenolol (TENORMIN) 25 MG tablet take 1/2 tablet by mouth once daily 45 tablet 3    linagliptin (TRADJENTA) 5 MG tablet Take 5 mg by mouth daily      pantoprazole (PROTONIX) 40 MG tablet Take 1 tablet by mouth daily 30-60\" before heaviest meal on an empty stomach 30 tablet 3    metFORMIN (GLUCOPHAGE) 500 MG tablet Take 500 mg by mouth daily (with breakfast)      DULoxetine (CYMBALTA) 30 MG extended release capsule Take 30 mg by mouth daily      SUMAtriptan (IMITREX) 50 MG tablet Take 50 mg by mouth once as needed for Migraine      naproxen (NAPROSYN) 500 MG tablet Take 500 mg by mouth as needed for Pain      alendronate (FOSAMAX) 35 MG tablet Take 35 mg by mouth every 7 days       atorvastatin (LIPITOR) 10 MG tablet Take 10 mg by mouth daily.       rOPINIRole (REQUIP) 0.5 MG tablet Take 0.5 mg by mouth nightly. No current facility-administered medications for this visit. ROS  Review of Systems  General/Constitutional: No recent loss of weight or appetite changes. No fever or chills. HENT: Negative. Eyes: Negative. Upper respiratory tract: No nasal stuffiness or post nasal drip. Lower respiratory tract/ lungs: No cough or sputum production. No hemoptysis. Cardiovascular: No palpitations or chest pain. Gastrointestinal: No nausea or vomiting. Neurological: No focal neurologiacal weakness. Extremities: No edema. Musculoskeletal: No complaints. Genitourinary: No complaints. Hematological: Negative. Psychiatric/Behavioral: Negative. Skin: No itching. Exam  Vitals -  /74 (Site: Left Upper Arm, Position: Sitting, Cuff Size: Large Adult)   Pulse 78   Ht 5' 8\" (1.727 m)   Wt (!) 326 lb 3.2 oz (148 kg)   SpO2 96% Comment: on RA  BMI 49.60 kg/m²    Body mass index is 49.6 kg/m². Oxygen level - Room Air  Physical Exam     General Appearance Moderately built, moderately nourished, in no acute distress. HEENT - Head is normocephalic, atraumatic. PERRL. Oral mucosa pink and moist, no oral thrush. Mallampati Score - IV (only hard palate visible). Neck - Supple, symmetrical, trachea midline and soft. Lungs - Chest symmetric with normal A/P diameter, normal respiratory rate and rhythm, lungs clear to auscultation, no wheezes, rales or rhonchi, aeration good. Cardiovascular - Heart sounds are normal.  Regular rhythm normal rate without murmur, gallop or rub. Abdomen - Soft, nontender, non-distended. Neurologic - Alert and oriented x 3. Skin - No bruising or bleeding. Extremities - No cyanosis, clubbing or edema. Assessment   Diagnosis Orders   1. TRINITY (obstructive sleep apnea)     2. RLS (restless legs syndrome)     3. Circadian rhythm sleep disorder     4. Psychophysiological insomnia      Sleep onset and maintenance   5. Poor sleep hygiene     6.  Class 3 severe obesity due to excess calories with serious comorbidity and body mass index (BMI) of 45.0 to 49.9 in adult [facility])        Recommendations  I reviewed the sleep study results in detail with Gabe Moreno and her . We discussed various treatment option of positional therapy, OAT and cpap along with the benefits and limitations of each. We discussed the poor sleep efficiency that may underestimate her results and severity of TRINITY. Based on her symptoms and medical issues, I advise CPAP but she is hesitant. She is not interested in OAT as she does wear dentures. She would like to try positional therapy and sleeping on her left side. We discussed tennis ball approach concept. Will try for 3 months, if no improvement, she will proceed with CPAP titration at that time. Her RLS symptoms are controlled with Requip. She defers having Ferritin level checked. We further discussed sleep hygiene and chronotherapy/sleep restriction to treat her insomnia. I emphasized the importance of complying with these recommendations if she wants to improve her insomnia. I do not recommend sleep aids with exception to continuation of Melatonin. Will follow up in 3 months to re-assess her status. She will call if she decides to proceed with CPAP titration before then.       Electronically signed by TIMOTHY Bedolla CNP on 6/20/2019 at 5:07 PM

## 2019-06-20 ENCOUNTER — OFFICE VISIT (OUTPATIENT)
Dept: PULMONOLOGY | Age: 69
End: 2019-06-20
Payer: MEDICARE

## 2019-06-20 VITALS
HEIGHT: 68 IN | BODY MASS INDEX: 44.41 KG/M2 | OXYGEN SATURATION: 96 % | HEART RATE: 78 BPM | DIASTOLIC BLOOD PRESSURE: 74 MMHG | SYSTOLIC BLOOD PRESSURE: 126 MMHG | WEIGHT: 293 LBS

## 2019-06-20 DIAGNOSIS — G25.81 RLS (RESTLESS LEGS SYNDROME): ICD-10-CM

## 2019-06-20 DIAGNOSIS — G47.33 OSA (OBSTRUCTIVE SLEEP APNEA): Primary | ICD-10-CM

## 2019-06-20 DIAGNOSIS — Z72.821 POOR SLEEP HYGIENE: ICD-10-CM

## 2019-06-20 DIAGNOSIS — G47.20 CIRCADIAN RHYTHM SLEEP DISORDER: ICD-10-CM

## 2019-06-20 DIAGNOSIS — E66.01 CLASS 3 SEVERE OBESITY DUE TO EXCESS CALORIES WITH SERIOUS COMORBIDITY AND BODY MASS INDEX (BMI) OF 45.0 TO 49.9 IN ADULT (HCC): ICD-10-CM

## 2019-06-20 DIAGNOSIS — F51.04 PSYCHOPHYSIOLOGICAL INSOMNIA: ICD-10-CM

## 2019-06-20 PROCEDURE — 99214 OFFICE O/P EST MOD 30 MIN: CPT | Performed by: NURSE PRACTITIONER

## 2019-07-03 ENCOUNTER — OFFICE VISIT (OUTPATIENT)
Dept: BARIATRICS/WEIGHT MGMT | Age: 69
End: 2019-07-03
Payer: MEDICARE

## 2019-07-03 VITALS
HEIGHT: 67 IN | DIASTOLIC BLOOD PRESSURE: 76 MMHG | TEMPERATURE: 98.2 F | RESPIRATION RATE: 18 BRPM | HEART RATE: 77 BPM | WEIGHT: 293 LBS | SYSTOLIC BLOOD PRESSURE: 125 MMHG | BODY MASS INDEX: 45.99 KG/M2

## 2019-07-03 DIAGNOSIS — K21.9 GASTROESOPHAGEAL REFLUX DISEASE, ESOPHAGITIS PRESENCE NOT SPECIFIED: ICD-10-CM

## 2019-07-03 DIAGNOSIS — E66.01 MORBID OBESITY WITH BMI OF 50.0-59.9, ADULT (HCC): Primary | ICD-10-CM

## 2019-07-03 DIAGNOSIS — E66.9 DIABETES MELLITUS TYPE 2 IN OBESE (HCC): ICD-10-CM

## 2019-07-03 DIAGNOSIS — E78.5 HYPERLIPIDEMIA, UNSPECIFIED HYPERLIPIDEMIA TYPE: ICD-10-CM

## 2019-07-03 DIAGNOSIS — I10 ESSENTIAL HYPERTENSION: ICD-10-CM

## 2019-07-03 DIAGNOSIS — G25.81 RLS (RESTLESS LEGS SYNDROME): ICD-10-CM

## 2019-07-03 DIAGNOSIS — E11.69 DIABETES MELLITUS TYPE 2 IN OBESE (HCC): ICD-10-CM

## 2019-07-03 PROCEDURE — 99204 OFFICE O/P NEW MOD 45 MIN: CPT | Performed by: PHYSICIAN ASSISTANT

## 2019-07-03 NOTE — PROGRESS NOTES
25 MG tablet take 1/2 tablet by mouth once daily 45 tablet 3    linagliptin (TRADJENTA) 5 MG tablet Take 5 mg by mouth daily      pantoprazole (PROTONIX) 40 MG tablet Take 1 tablet by mouth daily 30-60\" before heaviest meal on an empty stomach 30 tablet 3    metFORMIN (GLUCOPHAGE) 500 MG tablet Take 500 mg by mouth daily (with breakfast)      DULoxetine (CYMBALTA) 30 MG extended release capsule Take 30 mg by mouth daily      SUMAtriptan (IMITREX) 50 MG tablet Take 50 mg by mouth once as needed for Migraine      naproxen (NAPROSYN) 500 MG tablet Take 500 mg by mouth as needed for Pain      alendronate (FOSAMAX) 35 MG tablet Take 35 mg by mouth every 7 days       atorvastatin (LIPITOR) 10 MG tablet Take 10 mg by mouth daily.  rOPINIRole (REQUIP) 0.5 MG tablet Take 0.5 mg by mouth nightly. 24 hour diet recall and physical activity reviewed. Review of Systems:   Constitutional: Denies any fever, chills, (+)fatigue. Wound: Denies any rash, skin color changes or wound problems. Resp: Denies any cough, (+)shortness of breath. CV: Denies any chest pain, orthopnea or syncope. Endocrine: Denies heat intolerance, cold intolerance,polydipsia, polyuria  MS: Denies any myalgias, (+)arthralgias  GI: Denies any nausea, vomiting, diarrhea, constipation. : Denies any hematuria, hesitancy or dysuria. Neuro: Denies dizziness, syncope, headaches. Objective:    /76 (Site: Right Upper Arm, Position: Sitting, Cuff Size: Large Adult)   Pulse 77   Temp 98.2 °F (36.8 °C) (Oral)   Resp 18   Ht 5' 6.5\" (1.689 m)   Wt (!) 323 lb (146.5 kg)   BMI 51.35 kg/m²   Body mass index is 51.35 kg/m². Physical Exam:  CONSTITUTIONAL: alert, cooperative, no apparent distress. Alert and oriented x 3. SKIN:  No visible rashes or lesions. HEENT: Head is normocephalic, atraumatic. EOMI b/l  NECK: Supple  CHEST/LUNGS: respirations unlabored. CARDIOVASCULAR:Regular rate and rhythm.   NEUROLOGIC: There are

## 2019-08-02 ENCOUNTER — OFFICE VISIT (OUTPATIENT)
Dept: BARIATRICS/WEIGHT MGMT | Age: 69
End: 2019-08-02

## 2019-08-02 VITALS — WEIGHT: 293 LBS | BODY MASS INDEX: 45.99 KG/M2 | HEIGHT: 67 IN

## 2019-08-02 DIAGNOSIS — E66.01 MORBID OBESITY WITH BMI OF 50.0-59.9, ADULT (HCC): Primary | ICD-10-CM

## 2019-08-19 ENCOUNTER — OFFICE VISIT (OUTPATIENT)
Dept: CARDIOLOGY CLINIC | Age: 69
End: 2019-08-19
Payer: MEDICARE

## 2019-08-19 VITALS
HEART RATE: 92 BPM | WEIGHT: 293 LBS | HEIGHT: 68 IN | SYSTOLIC BLOOD PRESSURE: 119 MMHG | BODY MASS INDEX: 44.41 KG/M2 | DIASTOLIC BLOOD PRESSURE: 77 MMHG

## 2019-08-19 DIAGNOSIS — E78.01 FAMILIAL HYPERCHOLESTEROLEMIA: ICD-10-CM

## 2019-08-19 DIAGNOSIS — I10 ESSENTIAL HYPERTENSION: ICD-10-CM

## 2019-08-19 DIAGNOSIS — I25.10 CORONARY ARTERY DISEASE INVOLVING NATIVE CORONARY ARTERY OF NATIVE HEART WITHOUT ANGINA PECTORIS: Primary | ICD-10-CM

## 2019-08-19 PROCEDURE — 99213 OFFICE O/P EST LOW 20 MIN: CPT | Performed by: NUCLEAR MEDICINE

## 2019-08-19 NOTE — PROGRESS NOTES
[address] [facility],[place] [address] [address]  [facility] [address]  Dept: [phone]  Dept Fax: [phone]  Loc: [phone]    Visit Date: 8/19/2019    Marv Robbins is a 71 y.o. female who presents todayfor:  Chief Complaint   Patient presents with    Check-Up    Coronary Artery Disease    Hypertension    Hyperlipidemia   known CAd and cath before  Obesity   No chest pain  No changes in breathing  No new issues  Stable   Bp is better at home  Sleep apnea       HPI:  HPI  Past Medical History:   Diagnosis Date    Abdominal hernia     Cancer ([facility] [address]     skin    SCOTT (dyspnea on exertion)     false positive stress test 2014 with normal cardiac cath 2014.     GERD (gastroesophageal reflux disease)     Headache     Migraines    Hyperglycemia 2015    borderline takes Metformin    Hyperlipidemia     Migraine headache     Morbid obesity (HCC)     Osteopenia     RLS (restless legs syndrome)     Type 1 diabetes mellitus ([facility] [address]     UTI (urinary tract infection)     history of      Past Surgical History:   Procedure Laterality Date    BUNIONECTOMY Right 1980's    CARPAL TUNNEL RELEASE Bilateral 1990 [facility] 2016     x2 right x2 left    [facility]  [facility]    GASTRIC FUNDOPLICATION N/A 56/3/1682    ROBOTIC HIATAL HERNIA WITH NISSEN FUNDOPLICATION performed by Kayla Luciano MD at [facility] Left 02/2015     [facility]    JOINT REPLACEMENT Right     KNEE ARTHROPLASTY Left 2007    KNEE ARTHROPLASTY Right 2014    UPPER GASTROINTESTINAL ENDOSCOPY      UPPER GASTROINTESTINAL ENDOSCOPY N/A 11/13/2018    EGD DIAGNOSTIC ONLY performed by Diana Agarwal MD at [address] [facility]    VEIN SURGERY Bilateral 2019     Family History   Problem Relation Age of Onset    Arthritis Mother     High Blood Pressure Mother     Cancer Mother     Obesity Mother     Cancer Sister Subjective:  Review of Systems  General:   No fever, no chills, No fatigue or weight loss  Pulmonary:    No dyspnea, no wheezing  Cardiac:    Denies recent chest pain,   GI:     No nausea or vomiting, no abdominal pain  Neuro:    No dizziness or light headedness,   Musculoskeletal:  No recent active issues  Extremities:   No edema, good peripheral pulses      Objective:  Physical Exam  BP (!) 150/85   Pulse 92   Ht 5' 8\" (1.727 m)   Wt (!) 320 lb (145.2 kg)   BMI 48.66 kg/m²   General:   Well developed, well nourished  Lungs:   Clear to auscultation  Heart:    Normal S1 S2, Slight murmur. no rubs, no gallops  Abdomen:   Soft, non tender, no organomegalies, positive bowel sounds  Extremities:   No edema, no cyanosis, good peripheral pulses  Neurological:   Awake, alert, oriented. No obvious focal deficits  Musculoskelatal:  No obvious deformities    Assessment:      Diagnosis Orders   1. Coronary artery disease involving native coronary artery of native heart without angina pectoris     2. Essential hypertension     3. Familial hypercholesterolemia     cardiac fair for now     Plan:  No follow-ups on file. As above  Continue risk factor modification and medical management  Thank you for allowing me to participate in the care of your patient. Please don't hesitate to contact me regarding any further issues related to the patient care    Orders Placed:  No orders of the defined types were placed in this encounter. Medications Prescribed:  No orders of the defined types were placed in this encounter. Discussed use, benefit, and side effects of prescribed medications. All patient questions answered. Pt voicedunderstanding. Instructed to continue current medications, diet and exercise. Continue risk factor modification and medical management. Patient agreed with treatment plan. Follow up as directed.     Electronically signedby Gunnar Paez MD on 8/19/2019 at 10:34 AM

## 2019-08-26 ENCOUNTER — OFFICE VISIT (OUTPATIENT)
Dept: BARIATRICS/WEIGHT MGMT | Age: 69
End: 2019-08-26
Payer: MEDICARE

## 2019-08-26 VITALS
HEIGHT: 67 IN | RESPIRATION RATE: 18 BRPM | WEIGHT: 293 LBS | TEMPERATURE: 98.1 F | HEART RATE: 66 BPM | SYSTOLIC BLOOD PRESSURE: 126 MMHG | BODY MASS INDEX: 45.99 KG/M2 | DIASTOLIC BLOOD PRESSURE: 70 MMHG

## 2019-08-26 DIAGNOSIS — E11.69 DIABETES MELLITUS TYPE 2 IN OBESE (HCC): ICD-10-CM

## 2019-08-26 DIAGNOSIS — G25.81 RLS (RESTLESS LEGS SYNDROME): ICD-10-CM

## 2019-08-26 DIAGNOSIS — I10 ESSENTIAL HYPERTENSION: ICD-10-CM

## 2019-08-26 DIAGNOSIS — E66.01 MORBID OBESITY WITH BMI OF 45.0-49.9, ADULT (HCC): Primary | ICD-10-CM

## 2019-08-26 DIAGNOSIS — E78.5 HYPERLIPIDEMIA, UNSPECIFIED HYPERLIPIDEMIA TYPE: ICD-10-CM

## 2019-08-26 DIAGNOSIS — E66.9 DIABETES MELLITUS TYPE 2 IN OBESE (HCC): ICD-10-CM

## 2019-08-26 DIAGNOSIS — K21.9 GASTROESOPHAGEAL REFLUX DISEASE, ESOPHAGITIS PRESENCE NOT SPECIFIED: ICD-10-CM

## 2019-08-26 PROCEDURE — 99214 OFFICE O/P EST MOD 30 MIN: CPT | Performed by: PHYSICIAN ASSISTANT

## 2019-08-26 NOTE — PROGRESS NOTES
skin    SCOTT (dyspnea on exertion)     false positive stress test 2014 with normal cardiac cath 2014.     GERD (gastroesophageal reflux disease)     Headache     Migraines    Hyperglycemia 2015    borderline takes Metformin    Hyperlipidemia     Migraine headache     Morbid obesity (HCC)     Osteopenia     RLS (restless legs syndrome)     Type 1 diabetes mellitus ([facility] [address]     UTI (urinary tract infection)     history of       Past Surgical History:  Past Surgical History:   Procedure Laterality Date    BUNIONECTOMY Right 1980's    CARPAL TUNNEL RELEASE Bilateral 1990 2008 1991 2016     x2 right x2 left    [facility]OSCOPY  [facility]    [facility]    GASTRIC FUNDOPLICATION N/A 94/1/9582    ROBOTIC HIATAL HERNIA WITH NISSEN FUNDOPLICATION performed by K[facility]ia Collazo MD at [facility] Left 02/2015     [facility] [address]    JOINT REPLACEMENT Right     KNEE ARTHROPLASTY Left 2007    KNEE ARTHROPLASTY Right 2014    UPPER GASTROINTESTINAL ENDOSCOPY      UPPER GASTROINTESTINAL ENDOSCOPY N/A 11/13/2018    EGD DIAGNOSTIC ONLY performed by [facility]rena Horne [facility] at [facility]    VEIN SURGERY Bilateral 2019       Past Social History:  Social History     Socioeconomic History    Marital status:      Spouse name: Not on file    Number of children: Not on file    Years of education: Not on file    Highest education level: Not on file   Occupational History    Not on file   Social Needs    Financial resource strain: Not on file    Food insecurity:     Worry: Not on file     Inability: Not on file    Transportation needs:     Medical: Not on file     Non-medical: Not on file   Tobacco Use    Smoking status: Never Smoker    Smokeless tobacco: Never Used   Substance and Sexual Activity    Alcohol use: No    Drug use: No    Sexual activity: Not on file   Lifestyle    Physical activity:     Days per week: Not on file     Minutes per session: Not

## 2019-09-24 ENCOUNTER — OFFICE VISIT (OUTPATIENT)
Dept: PULMONOLOGY | Age: 69
End: 2019-09-24
Payer: MEDICARE

## 2019-09-24 VITALS
HEIGHT: 67 IN | SYSTOLIC BLOOD PRESSURE: 122 MMHG | HEART RATE: 91 BPM | DIASTOLIC BLOOD PRESSURE: 68 MMHG | BODY MASS INDEX: 45.99 KG/M2 | WEIGHT: 293 LBS | OXYGEN SATURATION: 97 %

## 2019-09-24 DIAGNOSIS — G25.81 RLS (RESTLESS LEGS SYNDROME): ICD-10-CM

## 2019-09-24 DIAGNOSIS — G47.00 INSOMNIA, UNSPECIFIED TYPE: ICD-10-CM

## 2019-09-24 DIAGNOSIS — G47.10 HYPERSOMNIA: Primary | ICD-10-CM

## 2019-09-24 PROCEDURE — 99212 OFFICE O/P EST SF 10 MIN: CPT | Performed by: NURSE PRACTITIONER

## 2019-09-24 NOTE — PROGRESS NOTES
Nihcole Arrieta         [id number]  9/24/2019   Chief Complaint   Patient presents with    Follow-up     TRINITY follow up with no download        Pt of Dr. Johana Cavazos    Neck Size: 15  Mallampati Mallampati 4  ESS:  5 (down from 10)      Presentation:   Constantino presents for sleep medicine follow up for insomnia, poor sleep hygiene. Since the last visit, Constantino has been focusing on positional therapy for treatment of TRINITY with left lateral position. Although she did not qualify for treatment based on overall AHI, she had poor sleep efficiency with elevated supine AHI of 8.8 and right side of 9.5, left side of 2.7. Her snoring has resolved. She is taking Melatonin 3 mg 2 hours before bedtime which has helped tremendously as well, along with sleep hygiene measures. ESS is down from 10 to 5. She continues on Requip with controlled symptoms of RLS. Deferred checking Ferritin level. Is now following with bariatrics for weight loss. Sleep issues:  Do you feel better? Yes  More rested? Yes   Better concentration? Yes    Progress History:   Since last visit any new medical issues? No  New ER or hospitlal visits? No  Any new or changes in medicines? No  Any new sleep medicines? No      Past Medical History:   Diagnosis Date    Abdominal hernia     Cancer ([address]     skin    SCOTT (dyspnea on exertion)     false positive stress test 2014 with normal cardiac cath 2014.     GERD (gastroesophageal reflux disease)     Headache     Migraines    Hyperglycemia 2015    borderline takes Metformin    Hyperlipidemia     Migraine headache     Morbid obesity (HCC)     Osteopenia     RLS (restless legs syndrome)     Type 1 diabetes mellitus ([address]     UTI (urinary tract infection)     history of       Past Surgical History:   Procedure Laterality Date    BUNIONECTOMY Right 1980's    CARPAL TUNNEL RELEASE Bilateral 1990 2008 [facility] [address] 2016     x2 right x2 left    [facility]OSCOPY  2017    [facility]    GASTRIC FUNDOPLICATION N/A (restless legs syndrome)            Plan   Continue with left lateral positional treatment. Continue Melatonin. Continue with sleep hygiene measures. Continue Requip, managed by PCP. Following with bariatrics weight loss program.      We will see Nargis DOMINGON. To call with any worsening of symptoms.     Electronically signed by TIMOTHY Colón CNP on 9/24/2019 at 10:35 AM

## 2019-10-14 ENCOUNTER — HOSPITAL ENCOUNTER (OUTPATIENT)
Age: 69
Discharge: HOME | End: 2019-10-14
Payer: MEDICARE

## 2019-10-14 LAB
ALBUMIN SERPL-MCNC: 4.1 G/DL (ref 3.5–5.1)
ALP BLD-CCNC: 75 U/L (ref 38–126)
ALT SERPL-CCNC: 12 U/L (ref 11–66)
ANION GAP SERPL CALCULATED.3IONS-SCNC: 13 MEQ/L (ref 8–16)
AST SERPL-CCNC: 14 U/L (ref 5–40)
BACTERIA: ABNORMAL
BASOPHILS # BLD: 0.4 %
BASOPHILS ABSOLUTE: 0 THOU/MM3 (ref 0–0.1)
BILIRUB SERPL-MCNC: 0.6 MG/DL (ref 0.3–1.2)
BILIRUBIN URINE: NEGATIVE
BLOOD, URINE: NEGATIVE
BUN BLDV-MCNC: 11 MG/DL (ref 7–22)
CALCIUM SERPL-MCNC: 9.7 MG/DL (ref 8.5–10.5)
CASTS: ABNORMAL /LPF
CASTS: ABNORMAL /LPF
CHARACTER, URINE: CLEAR
CHLORIDE BLD-SCNC: 103 MEQ/L (ref 98–111)
CHOLESTEROL, TOTAL: 137 MG/DL (ref 100–199)
CO2: 27 MEQ/L (ref 23–33)
COLOR: YELLOW
CREAT SERPL-MCNC: 0.8 MG/DL (ref 0.4–1.2)
CREATININE, URINE: 58.6 MG/DL
CRYSTALS: ABNORMAL
EOSINOPHIL # BLD: 3.9 %
EOSINOPHILS ABSOLUTE: 0.2 THOU/MM3 (ref 0–0.4)
EPITHELIAL CELLS, UA: ABNORMAL /HPF
ERYTHROCYTE [DISTWIDTH] IN BLOOD BY AUTOMATED COUNT: 13.5 % (ref 11.5–14.5)
ERYTHROCYTE [DISTWIDTH] IN BLOOD BY AUTOMATED COUNT: 43.1 FL (ref 35–45)
GFR SERPL CREATININE-BSD FRML MDRD: 71 ML/MIN/1.73M2
GLUCOSE BLD-MCNC: 142 MG/DL (ref 70–108)
GLUCOSE, URINE: NEGATIVE MG/DL
HCT VFR BLD CALC: 42.5 % (ref 37–47)
HDLC SERPL-MCNC: 42 MG/DL
HEMOGLOBIN: 13.9 GM/DL (ref 12–16)
IMMATURE GRANS (ABS): 0.01 THOU/MM3 (ref 0–0.07)
IMMATURE GRANULOCYTES: 0.2 %
KETONES, URINE: NEGATIVE
LDL CHOLESTEROL CALCULATED: 60 MG/DL
LEUKOCYTE EST, POC: ABNORMAL
LYMPHOCYTES # BLD: 29.2 %
LYMPHOCYTES ABSOLUTE: 1.7 THOU/MM3 (ref 1–4.8)
MCH RBC QN AUTO: 28.6 PG (ref 26–33)
MCHC RBC AUTO-ENTMCNC: 32.7 GM/DL (ref 32.2–35.5)
MCV RBC AUTO: 87.4 FL (ref 81–99)
MICROALBUMIN UR-MCNC: < 1.2 MG/DL
MICROALBUMIN/CREAT UR-RTO: 20 MG/G (ref 0–30)
MISCELLANEOUS LAB TEST RESULT: ABNORMAL
MONOCYTES # BLD: 7.4 %
MONOCYTES ABSOLUTE: 0.4 THOU/MM3 (ref 0.4–1.3)
NITRITE, URINE: NEGATIVE
NUCLEATED RED BLOOD CELLS: 0 /100 WBC
PH UA: 7.5 (ref 5–9)
PLATELET # BLD: 248 THOU/MM3 (ref 130–400)
PMV BLD AUTO: 10.1 FL (ref 9.4–12.4)
POTASSIUM SERPL-SCNC: 4.7 MEQ/L (ref 3.5–5.2)
PROTEIN UA: NEGATIVE MG/DL
RBC # BLD: 4.86 MILL/MM3 (ref 4.2–5.4)
RBC URINE: ABNORMAL /HPF
RENAL EPITHELIAL, UA: ABNORMAL
SEG NEUTROPHILS: 58.9 %
SEGMENTED NEUTROPHILS ABSOLUTE COUNT: 3.4 THOU/MM3 (ref 1.8–7.7)
SODIUM BLD-SCNC: 143 MEQ/L (ref 135–145)
SPECIFIC GRAVITY UA: 1.01 (ref 1–1.03)
T4 FREE: 0.99 NG/DL (ref 0.93–1.76)
TOTAL PROTEIN: 7.1 G/DL (ref 6.1–8)
TRIGL SERPL-MCNC: 176 MG/DL (ref 0–199)
TSH SERPL DL<=0.05 MIU/L-ACNC: 1.41 UIU/ML (ref 0.4–4.2)
UROBILINOGEN, URINE: 0.2 EU/DL (ref 0–1)
VITAMIN D 25-HYDROXY: 15 NG/ML (ref 30–100)
WBC # BLD: 5.7 THOU/MM3 (ref 4.8–10.8)
WBC UA: ABNORMAL /HPF
YEAST: ABNORMAL

## 2019-10-14 PROCEDURE — 84443 ASSAY THYROID STIM HORMONE: CPT

## 2019-10-14 PROCEDURE — 80053 COMPREHEN METABOLIC PANEL: CPT

## 2019-10-14 PROCEDURE — 82043 UR ALBUMIN QUANTITATIVE: CPT

## 2019-10-14 PROCEDURE — 80061 LIPID PANEL: CPT

## 2019-10-14 PROCEDURE — 85025 COMPLETE CBC W/AUTO DIFF WBC: CPT

## 2019-10-14 PROCEDURE — 81001 URINALYSIS AUTO W/SCOPE: CPT

## 2019-10-14 PROCEDURE — 84439 ASSAY OF FREE THYROXINE: CPT

## 2019-10-14 PROCEDURE — 82306 VITAMIN D 25 HYDROXY: CPT

## 2019-10-14 PROCEDURE — 36415 COLL VENOUS BLD VENIPUNCTURE: CPT

## 2019-10-20 ENCOUNTER — HOSPITAL ENCOUNTER (EMERGENCY)
Age: 69
Discharge: HOME | End: 2019-10-20
Payer: MEDICARE

## 2019-10-20 VITALS
HEIGHT: 68 IN | WEIGHT: 293 LBS | DIASTOLIC BLOOD PRESSURE: 84 MMHG | SYSTOLIC BLOOD PRESSURE: 138 MMHG | OXYGEN SATURATION: 95 % | BODY MASS INDEX: 44.41 KG/M2 | RESPIRATION RATE: 16 BRPM | TEMPERATURE: 97.5 F | HEART RATE: 77 BPM

## 2019-10-20 DIAGNOSIS — R22.0 RIGHT FACIAL SWELLING: Primary | ICD-10-CM

## 2019-10-20 LAB
ALBUMIN SERPL-MCNC: 3.7 G/DL (ref 3.5–5.1)
ALP BLD-CCNC: 72 U/L (ref 38–126)
ALT SERPL-CCNC: 11 U/L (ref 11–66)
ANION GAP SERPL CALCULATED.3IONS-SCNC: 10 MEQ/L (ref 8–16)
AST SERPL-CCNC: 13 U/L (ref 5–40)
BILIRUB SERPL-MCNC: 0.5 MG/DL (ref 0.3–1.2)
BUN BLDV-MCNC: 10 MG/DL (ref 7–22)
CALCIUM SERPL-MCNC: 9.2 MG/DL (ref 8.5–10.5)
CHLORIDE BLD-SCNC: 104 MEQ/L (ref 98–111)
CO2: 25 MEQ/L (ref 23–33)
CREAT SERPL-MCNC: 0.7 MG/DL (ref 0.4–1.2)
GFR SERPL CREATININE-BSD FRML MDRD: 83 ML/MIN/1.73M2
GLUCOSE BLD-MCNC: 147 MG/DL (ref 70–108)
OSMOLALITY CALCULATION: 279.3 MOSMOL/KG (ref 275–300)
POTASSIUM SERPL-SCNC: 4.2 MEQ/L (ref 3.5–5.2)
SODIUM BLD-SCNC: 139 MEQ/L (ref 135–145)
TOTAL PROTEIN: 6.7 G/DL (ref 6.1–8)

## 2019-10-20 PROCEDURE — 6370000000 HC RX 637 (ALT 250 FOR IP): Performed by: STUDENT IN AN ORGANIZED HEALTH CARE EDUCATION/TRAINING PROGRAM

## 2019-10-20 PROCEDURE — 36415 COLL VENOUS BLD VENIPUNCTURE: CPT

## 2019-10-20 PROCEDURE — 99283 EMERGENCY DEPT VISIT LOW MDM: CPT

## 2019-10-20 PROCEDURE — 80053 COMPREHEN METABOLIC PANEL: CPT

## 2019-10-20 RX ORDER — VALACYCLOVIR HYDROCHLORIDE 500 MG/1
1000 TABLET, FILM COATED ORAL ONCE
Status: COMPLETED | OUTPATIENT
Start: 2019-10-20 | End: 2019-10-20

## 2019-10-20 RX ORDER — VALACYCLOVIR HYDROCHLORIDE 1 G/1
1000 TABLET, FILM COATED ORAL 3 TIMES DAILY
Qty: 21 TABLET | Refills: 0 | Status: SHIPPED | OUTPATIENT
Start: 2019-10-20 | End: 2019-10-27

## 2019-10-20 RX ORDER — AMOXICILLIN AND CLAVULANATE POTASSIUM 875; 125 MG/1; MG/1
1 TABLET, FILM COATED ORAL ONCE
Status: COMPLETED | OUTPATIENT
Start: 2019-10-20 | End: 2019-10-20

## 2019-10-20 RX ORDER — AMOXICILLIN AND CLAVULANATE POTASSIUM 875; 125 MG/1; MG/1
1 TABLET, FILM COATED ORAL 2 TIMES DAILY
Qty: 20 TABLET | Refills: 0 | Status: SHIPPED | OUTPATIENT
Start: 2019-10-20 | End: 2019-10-30

## 2019-10-20 RX ORDER — TRAMADOL HYDROCHLORIDE 50 MG/1
50 TABLET ORAL EVERY 12 HOURS PRN
Qty: 12 TABLET | Refills: 0 | Status: SHIPPED | OUTPATIENT
Start: 2019-10-20 | End: 2019-10-26

## 2019-10-20 RX ADMIN — VALACYCLOVIR HYDROCHLORIDE 1000 MG: 500 TABLET, FILM COATED ORAL at 13:40

## 2019-10-20 RX ADMIN — AMOXICILLIN AND CLAVULANATE POTASSIUM 1 TABLET: 875; 125 TABLET, FILM COATED ORAL at 13:40

## 2019-10-20 ASSESSMENT — ENCOUNTER SYMPTOMS
ABDOMINAL PAIN: 0
RHINORRHEA: 0
VOMITING: 0
EYE DISCHARGE: 0
NAUSEA: 0
FACIAL SWELLING: 1
SHORTNESS OF BREATH: 0
SORE THROAT: 0
COUGH: 0
EYE PAIN: 0
BACK PAIN: 0
DIARRHEA: 0
WHEEZING: 0

## 2019-10-20 ASSESSMENT — PAIN SCALES - GENERAL
PAINLEVEL_OUTOF10: 2
PAINLEVEL_OUTOF10: 2

## 2019-10-20 ASSESSMENT — PAIN DESCRIPTION - PAIN TYPE
TYPE: ACUTE PAIN
TYPE: ACUTE PAIN

## 2019-10-20 ASSESSMENT — PAIN DESCRIPTION - LOCATION
LOCATION: FACE
LOCATION: FACE

## 2019-11-05 ENCOUNTER — HOSPITAL ENCOUNTER (OUTPATIENT)
Age: 69
Discharge: HOME | End: 2019-11-05
Payer: MEDICARE

## 2019-11-05 LAB
ADENOVIRUS F 40 41 PCR: NOT DETECTED
ALBUMIN SERPL-MCNC: 4.3 G/DL (ref 3.5–5.1)
ALP BLD-CCNC: 84 U/L (ref 38–126)
ALT SERPL-CCNC: 10 U/L (ref 11–66)
ANION GAP SERPL CALCULATED.3IONS-SCNC: 13 MEQ/L (ref 8–16)
AST SERPL-CCNC: 14 U/L (ref 5–40)
ASTROVIRUS PCR: NOT DETECTED
BACTERIA: ABNORMAL
BASOPHILS # BLD: 0.6 %
BASOPHILS ABSOLUTE: 0 THOU/MM3 (ref 0–0.1)
BILIRUB SERPL-MCNC: 0.6 MG/DL (ref 0.3–1.2)
BILIRUBIN URINE: ABNORMAL
BLOOD, URINE: NEGATIVE
BUN BLDV-MCNC: 9 MG/DL (ref 7–22)
CALCIUM SERPL-MCNC: 9.4 MG/DL (ref 8.5–10.5)
CAMPYLOBACTER PCR: NOT DETECTED
CASTS: ABNORMAL /LPF
CASTS: ABNORMAL /LPF
CHARACTER, URINE: ABNORMAL
CHLORIDE BLD-SCNC: 101 MEQ/L (ref 98–111)
CHOLESTEROL, TOTAL: 143 MG/DL (ref 100–199)
CLOSTRIDIUM DIFFICILE, PCR: NOT DETECTED
CO2: 27 MEQ/L (ref 23–33)
COLOR: YELLOW
CREAT SERPL-MCNC: 0.8 MG/DL (ref 0.4–1.2)
CREATININE, URINE: 634 MG/DL
CRYPTOSPORIDIUM PCR: NOT DETECTED
CRYSTALS: ABNORMAL
CYCLOSPORA CAYETANENSIS PCR: NOT DETECTED
E COLI 0157 PCR: NORMAL
E COLI ENTEROAGGREGATIVE PCR: NOT DETECTED
E COLI ENTEROPATHOGENIC PCR: NOT DETECTED
E COLI ENTEROTOXIGENIC PCR: NOT DETECTED
E COLI SHIGA LIKE TOXIN PCR: NOT DETECTED
E COLI SHIGELLA/ENTEROINVASIVE PCR: NOT DETECTED
E HISTOLYTICA GI FILM ARRAY: NOT DETECTED
EOSINOPHIL # BLD: 1.9 %
EOSINOPHILS ABSOLUTE: 0.1 THOU/MM3 (ref 0–0.4)
EPITHELIAL CELLS, UA: ABNORMAL /HPF
ERYTHROCYTE [DISTWIDTH] IN BLOOD BY AUTOMATED COUNT: 13.8 % (ref 11.5–14.5)
ERYTHROCYTE [DISTWIDTH] IN BLOOD BY AUTOMATED COUNT: 43.8 FL (ref 35–45)
GFR SERPL CREATININE-BSD FRML MDRD: 71 ML/MIN/1.73M2
GIARDIA LAMBLIA PCR: NOT DETECTED
GLUCOSE BLD-MCNC: 130 MG/DL (ref 70–108)
GLUCOSE, URINE: NEGATIVE MG/DL
HCT VFR BLD CALC: 42.2 % (ref 37–47)
HDLC SERPL-MCNC: 37 MG/DL
HEMOGLOBIN: 13.9 GM/DL (ref 12–16)
ICTOTEST: NEGATIVE
IMMATURE GRANS (ABS): 0.02 THOU/MM3 (ref 0–0.07)
IMMATURE GRANULOCYTES: 0.4 %
KETONES, URINE: ABNORMAL
LDL CHOLESTEROL CALCULATED: 73 MG/DL
LEUKOCYTE EST, POC: ABNORMAL
LYMPHOCYTES # BLD: 27.5 %
LYMPHOCYTES ABSOLUTE: 1.5 THOU/MM3 (ref 1–4.8)
MCH RBC QN AUTO: 28.9 PG (ref 26–33)
MCHC RBC AUTO-ENTMCNC: 32.9 GM/DL (ref 32.2–35.5)
MCV RBC AUTO: 87.7 FL (ref 81–99)
MICROALBUMIN UR-MCNC: 7.5 MG/DL
MICROALBUMIN/CREAT UR-RTO: 12 MG/G (ref 0–30)
MISCELLANEOUS LAB TEST RESULT: ABNORMAL
MONOCYTES # BLD: 8 %
MONOCYTES ABSOLUTE: 0.4 THOU/MM3 (ref 0.4–1.3)
NITRITE, URINE: NEGATIVE
NOROVIRUS GI GII PCR: NOT DETECTED
NUCLEATED RED BLOOD CELLS: 0 /100 WBC
PH UA: 6 (ref 5–9)
PLATELET # BLD: 243 THOU/MM3 (ref 130–400)
PLESIOMONAS SHIGELLOIDES PCR: NOT DETECTED
PMV BLD AUTO: 10 FL (ref 9.4–12.4)
POTASSIUM SERPL-SCNC: 4.9 MEQ/L (ref 3.5–5.2)
PROTEIN UA: 30 MG/DL
RBC # BLD: 4.81 MILL/MM3 (ref 4.2–5.4)
RBC URINE: ABNORMAL /HPF
RENAL EPITHELIAL, UA: ABNORMAL
ROTAVIRUS A PCR: NOT DETECTED
SALMONELLA PCR: NOT DETECTED
SAPOVIRUS PCR: NOT DETECTED
SEG NEUTROPHILS: 61.6 %
SEGMENTED NEUTROPHILS ABSOLUTE COUNT: 3.3 THOU/MM3 (ref 1.8–7.7)
SODIUM BLD-SCNC: 141 MEQ/L (ref 135–145)
SPECIFIC GRAVITY UA: >= 1.03 (ref 1–1.03)
T4 FREE: 1.01 NG/DL (ref 0.93–1.76)
TOTAL PROTEIN: 7.4 G/DL (ref 6.1–8)
TRIGL SERPL-MCNC: 166 MG/DL (ref 0–199)
TSH SERPL DL<=0.05 MIU/L-ACNC: 1.58 UIU/ML (ref 0.4–4.2)
UROBILINOGEN, URINE: 0.2 EU/DL (ref 0–1)
VIBRIO CHOLERAE PCR: NOT DETECTED
VIBRIO PCR: NOT DETECTED
VITAMIN D 25-HYDROXY: 18 NG/ML (ref 30–100)
WBC # BLD: 5.4 THOU/MM3 (ref 4.8–10.8)
WBC UA: ABNORMAL /HPF
YEAST: ABNORMAL
YERSINIA ENTEROCOLITICA PCR: NOT DETECTED

## 2019-11-05 PROCEDURE — 82705 FATS/LIPIDS FECES QUAL: CPT

## 2019-11-05 PROCEDURE — 84443 ASSAY THYROID STIM HORMONE: CPT

## 2019-11-05 PROCEDURE — 82306 VITAMIN D 25 HYDROXY: CPT

## 2019-11-05 PROCEDURE — 36415 COLL VENOUS BLD VENIPUNCTURE: CPT

## 2019-11-05 PROCEDURE — 82043 UR ALBUMIN QUANTITATIVE: CPT

## 2019-11-05 PROCEDURE — 80061 LIPID PANEL: CPT

## 2019-11-05 PROCEDURE — 80053 COMPREHEN METABOLIC PANEL: CPT

## 2019-11-05 PROCEDURE — 83520 IMMUNOASSAY QUANT NOS NONAB: CPT

## 2019-11-05 PROCEDURE — 87507 IADNA-DNA/RNA PROBE TQ 12-25: CPT

## 2019-11-05 PROCEDURE — 85025 COMPLETE CBC W/AUTO DIFF WBC: CPT

## 2019-11-05 PROCEDURE — 84439 ASSAY OF FREE THYROXINE: CPT

## 2019-11-05 PROCEDURE — 81001 URINALYSIS AUTO W/SCOPE: CPT

## 2019-11-07 LAB — FECAL FAT, QUALITATIVE: NORMAL

## 2019-11-08 LAB — PANCREATIC ELASTASE, FECAL: < 15 UG/G

## 2019-11-12 ENCOUNTER — HOSPITAL ENCOUNTER (OUTPATIENT)
Age: 69
Discharge: HOME | End: 2019-11-12
Payer: MEDICARE

## 2019-11-12 LAB
BACTERIA: ABNORMAL
BILIRUBIN URINE: NEGATIVE
BLOOD, URINE: NEGATIVE
CASTS: ABNORMAL /LPF
CHARACTER, URINE: ABNORMAL
COLOR: YELLOW
CRYSTALS: ABNORMAL
EPITHELIAL CELLS, UA: ABNORMAL /HPF
GLUCOSE, URINE: NEGATIVE MG/DL
KETONES, URINE: NEGATIVE
LEUKOCYTE EST, POC: ABNORMAL
MISCELLANEOUS LAB TEST RESULT: ABNORMAL
NITRITE, URINE: NEGATIVE
PH UA: 6.5 (ref 5–9)
PROTEIN UA: NEGATIVE MG/DL
RBC URINE: ABNORMAL /HPF
RENAL EPITHELIAL, UA: ABNORMAL
SPECIFIC GRAVITY UA: 1.02 (ref 1–1.03)
UROBILINOGEN, URINE: 1 EU/DL (ref 0–1)
WBC UA: ABNORMAL /HPF
YEAST: ABNORMAL

## 2019-11-12 PROCEDURE — 81001 URINALYSIS AUTO W/SCOPE: CPT

## 2019-12-01 ENCOUNTER — HOSPITAL ENCOUNTER (OUTPATIENT)
Age: 69
Discharge: HOME | End: 2019-12-01
Payer: MEDICARE

## 2019-12-01 PROCEDURE — 83520 IMMUNOASSAY QUANT NOS NONAB: CPT

## 2019-12-01 PROCEDURE — 82705 FATS/LIPIDS FECES QUAL: CPT

## 2019-12-05 LAB — FECAL FAT, QUALITATIVE: NORMAL

## 2019-12-06 LAB — PANCREATIC ELASTASE, FECAL: 275 UG/G

## 2019-12-22 ENCOUNTER — HOSPITAL ENCOUNTER (EMERGENCY)
Age: 69
Discharge: HOME | End: 2019-12-22
Payer: MEDICARE

## 2019-12-22 VITALS
DIASTOLIC BLOOD PRESSURE: 65 MMHG | HEART RATE: 101 BPM | SYSTOLIC BLOOD PRESSURE: 132 MMHG | RESPIRATION RATE: 16 BRPM | OXYGEN SATURATION: 98 % | BODY MASS INDEX: 44.41 KG/M2 | WEIGHT: 293 LBS | TEMPERATURE: 98.5 F | HEIGHT: 68 IN

## 2019-12-22 DIAGNOSIS — J06.9 ACUTE UPPER RESPIRATORY INFECTION: ICD-10-CM

## 2019-12-22 DIAGNOSIS — R05.9 COUGH: Primary | ICD-10-CM

## 2019-12-22 DIAGNOSIS — J01.90 ACUTE SINUSITIS, RECURRENCE NOT SPECIFIED, UNSPECIFIED LOCATION: ICD-10-CM

## 2019-12-22 PROCEDURE — 99212 OFFICE O/P EST SF 10 MIN: CPT

## 2019-12-22 PROCEDURE — 99213 OFFICE O/P EST LOW 20 MIN: CPT | Performed by: NURSE PRACTITIONER

## 2019-12-22 RX ORDER — AMOXICILLIN AND CLAVULANATE POTASSIUM 875; 125 MG/1; MG/1
1 TABLET, FILM COATED ORAL 2 TIMES DAILY
Qty: 20 TABLET | Refills: 0 | Status: SHIPPED | OUTPATIENT
Start: 2019-12-22 | End: 2020-01-01

## 2019-12-22 ASSESSMENT — ENCOUNTER SYMPTOMS
SINUS PRESSURE: 1
SORE THROAT: 1
NAUSEA: 0
WHEEZING: 0
DIARRHEA: 0
VOMITING: 0
SINUS PAIN: 1
RHINORRHEA: 1
COUGH: 1

## 2019-12-22 ASSESSMENT — PAIN DESCRIPTION - LOCATION: LOCATION: THROAT

## 2019-12-22 ASSESSMENT — PAIN DESCRIPTION - PAIN TYPE: TYPE: ACUTE PAIN

## 2019-12-22 ASSESSMENT — PAIN SCALES - GENERAL: PAINLEVEL_OUTOF10: 6

## 2020-06-18 ENCOUNTER — HOSPITAL ENCOUNTER (OUTPATIENT)
Dept: WOMENS IMAGING | Age: 70
Discharge: HOME | End: 2020-06-18
Payer: MEDICARE

## 2020-06-18 PROCEDURE — 77063 BREAST TOMOSYNTHESIS BI: CPT

## 2020-07-03 ENCOUNTER — HOSPITAL ENCOUNTER (OUTPATIENT)
Age: 70
Discharge: HOME | End: 2020-07-03
Payer: MEDICARE

## 2020-07-03 ENCOUNTER — HOSPITAL ENCOUNTER (OUTPATIENT)
Dept: GENERAL RADIOLOGY | Age: 70
Discharge: HOME | End: 2020-07-03
Payer: MEDICARE

## 2020-07-03 PROCEDURE — 73502 X-RAY EXAM HIP UNI 2-3 VIEWS: CPT

## 2020-07-13 RX ORDER — ATENOLOL 25 MG/1
TABLET ORAL
Qty: 45 TABLET | Refills: 0 | Status: SHIPPED | OUTPATIENT
Start: 2020-07-13 | End: 2020-08-24 | Stop reason: SDUPTHER

## 2020-07-13 NOTE — TELEPHONE ENCOUNTER
Nargis Chan called requesting a refill on the following medications:  Requested Prescriptions     Pending Prescriptions Disp Refills    atenolol (TENORMIN) 25 MG tablet 45 tablet 3     Pharmacy verified: [address] [place] [address]  .       Date of last visit: 8/19/2019  Date of next visit (if applicable): 3/48/0682

## 2020-07-21 ENCOUNTER — TELEPHONE (OUTPATIENT)
Dept: CASE MANAGEMENT | Age: 70
End: 2020-07-21

## 2020-07-21 NOTE — TELEPHONE ENCOUNTER
Name: Gloria Spencer  : 1950  MRN: [id number]    Oncology Navigation Follow-Up Note    Contact Type:  Telephone    Notes: Left message for Edith Israel to call back re: her request for genetic evaluation, indicated on [facility] breast cancer risk assessment form.     Electronically signed by Paulina Steele RN on 2020 at 9:41 AM

## 2020-07-29 ENCOUNTER — TELEPHONE (OUTPATIENT)
Dept: CASE MANAGEMENT | Age: 70
End: 2020-07-29

## 2020-07-29 NOTE — TELEPHONE ENCOUNTER
Name: Estelle La  : 1950  MRN: [id number]    Oncology Navigation Follow-Up Note    Contact Type:  Telephone    Notes: Called patient regarding request for genetic evaluation. Breast Cancer Risk Assessment form reviewed, patient originally completed at [facility] 2020. Verbalizes understanding of appointment with Effie Davis, genetic counselor in [facility], approximately Sept/Oct, genetic office will call her with appointment date/time. 0349 Basil Shah's office with request for referral and faxed form to be completed. Instructed office to fax directly to [place]. Will follow. Verified family history with patient as follows: Mother pancreatic cancer, age 80; sister ovarian cancer, age 46; maternal grandmother breast cancer, age 68; maternal grandfather colon cancer, age [de-identified].     Electronically signed by Giorgi Guzman RN on 2020 at 10:59 AM

## 2020-08-24 ENCOUNTER — OFFICE VISIT (OUTPATIENT)
Dept: CARDIOLOGY CLINIC | Age: 70
End: 2020-08-24
Payer: MEDICARE

## 2020-08-24 VITALS
HEIGHT: 68 IN | HEART RATE: 72 BPM | DIASTOLIC BLOOD PRESSURE: 79 MMHG | WEIGHT: 293 LBS | SYSTOLIC BLOOD PRESSURE: 124 MMHG | BODY MASS INDEX: 44.41 KG/M2

## 2020-08-24 PROCEDURE — 99213 OFFICE O/P EST LOW 20 MIN: CPT | Performed by: NUCLEAR MEDICINE

## 2020-08-24 PROCEDURE — 93000 ELECTROCARDIOGRAM COMPLETE: CPT | Performed by: NUCLEAR MEDICINE

## 2020-08-24 RX ORDER — ATENOLOL 25 MG/1
TABLET ORAL
Qty: 45 TABLET | Refills: 3 | Status: SHIPPED | OUTPATIENT
Start: 2020-08-24 | End: 2020-11-06 | Stop reason: SDUPTHER

## 2020-08-24 NOTE — PROGRESS NOTES
[address] [facility],[place] [address] [address]  [facility] [address]  Dept: [phone]  Dept Fax: [phone]  Loc: [phone]    Visit Date: 8/24/2020    Stephanie Khanna is a 79 y.o. female who presents todayfor:  Chief Complaint   Patient presents with    Check-Up    Coronary Artery Disease    Hypertension    Hyperlipidemia   some dyspnea  No changes in breathing  Known mild CAd on a cath before  BP is stable  No dizziness  No syncope  No palpitation  No other issues       HPI:  HPI  Past Medical History:   Diagnosis Date    Abdominal hernia     Cancer ([facility] [address]     skin    SCOTT (dyspnea on exertion)     false positive stress test 2014 with normal cardiac cath 2014.     GERD (gastroesophageal reflux disease)     Headache     Migraines    Hyperglycemia 2015    borderline takes Metformin    Hyperlipidemia     Migraine headache     Morbid obesity (HCC)     Osteopenia     RLS (restless legs syndrome)     Type 1 diabetes mellitus ([address]     UTI (urinary tract infection)     history of      Past Surgical History:   Procedure Laterality Date    BUNIONECTOMY Right 1980's    CARPAL TUNNEL RELEASE Bilateral 1990 2008 [facility] 2016     x2 right x2 left    [facility]OSCOPY  2017    [facility]    GASTRIC FUNDOPLICATION N/A 05/5/2592    ROBOTIC HIATAL HERNIA WITH NISSEN FUNDOPLICATION performed by Corky Sanchez MD at [facility] Left 02/2015     [facility]    JOINT REPLACEMENT Right     KNEE ARTHROPLASTY Left 2007    KNEE ARTHROPLASTY Right 2014    UPPER GASTROINTESTINAL ENDOSCOPY      UPPER GASTROINTESTINAL ENDOSCOPY N/A 11/13/2018    EGD DIAGNOSTIC ONLY performed by Jordyn Mai MD at [address] [facility]    VEIN SURGERY Bilateral 2019     Family History   Problem Relation Age of Onset    Arthritis Mother     High Blood Pressure Mother     Cancer Mother     Obesity Mother     Cancer Sister ovarian    Cancer Maternal Grandmother         breast    Arthritis Maternal Grandmother     Cancer Maternal Grandfather         colon    Arthritis Maternal Grandfather      Social History     Tobacco Use    Smoking status: Never Smoker    Smokeless tobacco: Never Used   Substance Use Topics    Alcohol use: No      Current Outpatient Medications   Medication Sig Dispense Refill    Multiple Vitamins-Minerals (PRESERVISION AREDS 2 PO) Take by mouth daily      Probiotic Product (PROBIOTIC DAILY PO) Take by mouth daily      VITAMIN D PO Take by mouth daily      atenolol (TENORMIN) 25 MG tablet take 1/2 tablet by mouth once daily 45 tablet 0    linagliptin (TRADJENTA) 5 MG tablet Take 5 mg by mouth daily      metFORMIN (GLUCOPHAGE) 500 MG tablet Take 500 mg by mouth 2 times daily (with meals)       DULoxetine (CYMBALTA) 30 MG extended release capsule Take 30 mg by mouth daily      SUMAtriptan (IMITREX) 50 MG tablet Take 50 mg by mouth once as needed for Migraine      naproxen (NAPROSYN) 500 MG tablet Take 500 mg by mouth as needed for Pain      atorvastatin (LIPITOR) 10 MG tablet Take 10 mg by mouth daily.  rOPINIRole (REQUIP) 0.5 MG tablet Take 0.5 mg by mouth nightly.  melatonin 3 MG TABS tablet Take 1 tablet by mouth nightly as needed (insomnia) 30 tablet 11     No current facility-administered medications for this visit.       No Known Allergies  Health Maintenance   Topic Date Due    Hepatitis C screen  1950    Diabetic foot exam  03/25/1960    Diabetic retinal exam  03/25/1960    DTaP/Tdap/Td vaccine (1 - Tdap) 03/25/1969    Shingles Vaccine (1 of 2) 03/25/2000    Colon cancer screen colonoscopy  03/25/2000    DEXA (modify frequency per FRAX score)  03/25/2005    Pneumococcal 65+ years Vaccine (1 of 1 - PPSV23) 03/25/2015    Annual Wellness Visit (AWV)  06/20/2019    A1C test (Diabetic or Prediabetic)  01/08/2020    Flu vaccine (1) 09/01/2020    Diabetic microalbuminuria test  11/05/2020    Lipid screen  11/05/2020    Breast cancer screen  06/18/2022    Hepatitis A vaccine  Aged Out    Hib vaccine  Aged Out    Meningococcal (ACWY) vaccine  Aged Out       Subjective:  Review of Systems  General:   No fever, no chills, No fatigue or weight loss  Pulmonary:    baseline dyspnea, no wheezing  Cardiac:    Denies recent chest pain,   GI:     No nausea or vomiting, no abdominal pain  Neuro:    No dizziness or light headedness,   Musculoskeletal:  No recent active issues  Extremities:   No edema, no obvious claudication       Objective:  Physical Exam  /79   Pulse 72   Ht 5' 8\" (1.727 m)   Wt (!) 310 lb 9.6 oz (140.9 kg)   BMI 47.23 kg/m²   General:   Well developed, well nourished  Lungs:   Clear to auscultation  Heart:    Normal S1 S2, Slight murmur. no rubs, no gallops  Abdomen:   Soft, non tender, no organomegalies, positive bowel sounds  Extremities:   No edema, no cyanosis, good peripheral pulses  Neurological:   Awake, alert, oriented. No obvious focal deficits  Musculoskelatal:  No obvious deformities    Assessment:      Diagnosis Orders   1. Coronary artery disease involving native coronary artery of native heart without angina pectoris  EKG 12 Lead   2. Essential hypertension     3. Familial hypercholesterolemia     as above  Cardiac fair for now   Risk for CAD   Symptoms as above   ECG in office was done today. I reviewed the ECG. No acute findings      Plan:  No follow-ups on file. As above  Consider a follow up stress test   Continue risk factor modification and medical management  Thank you for allowing me to participate in the care of your patient. Please don't hesitate to contact me regarding any further issues related to the patient care    Orders Placed:  Orders Placed This Encounter   Procedures    EKG 12 Lead     Order Specific Question:   Reason for Exam?     Answer:    Other       Medications Prescribed:  No orders of the defined types were placed in this encounter. Discussed use, benefit, and side effects of prescribed medications. All patient questions answered. Pt voicedunderstanding. Instructed to continue current medications, diet and exercise. Continue risk factor modification and medical management. Patient agreed with treatment plan. Follow up as directed.     Electronically signedby Rosa Booth MD on 8/24/2020 at 10:42 AM

## 2020-09-14 ENCOUNTER — HOSPITAL ENCOUNTER (OUTPATIENT)
Dept: NON INVASIVE DIAGNOSTICS | Age: 70
Discharge: HOME | End: 2020-09-14
Payer: MEDICARE

## 2020-09-14 VITALS — WEIGHT: 293 LBS | HEIGHT: 68 IN | BODY MASS INDEX: 44.41 KG/M2

## 2020-09-14 LAB
LV EF: 60 %
LVEF MODALITY: NORMAL

## 2020-09-14 PROCEDURE — 93017 CV STRESS TEST TRACING ONLY: CPT | Performed by: NUCLEAR MEDICINE

## 2020-09-14 PROCEDURE — 78452 HT MUSCLE IMAGE SPECT MULT: CPT

## 2020-09-14 PROCEDURE — 93016 CV STRESS TEST SUPVJ ONLY: CPT | Performed by: NUCLEAR MEDICINE

## 2020-09-14 PROCEDURE — 93306 TTE W/DOPPLER COMPLETE: CPT

## 2020-09-14 PROCEDURE — A9500 TC99M SESTAMIBI: HCPCS | Performed by: NUCLEAR MEDICINE

## 2020-09-14 PROCEDURE — 78452 HT MUSCLE IMAGE SPECT MULT: CPT | Performed by: NUCLEAR MEDICINE

## 2020-09-14 PROCEDURE — 6360000002 HC RX W HCPCS

## 2020-09-14 PROCEDURE — 3430000000 HC RX DIAGNOSTIC RADIOPHARMACEUTICAL: Performed by: NUCLEAR MEDICINE

## 2020-09-14 PROCEDURE — 93018 CV STRESS TEST I&R ONLY: CPT | Performed by: NUCLEAR MEDICINE

## 2020-09-14 RX ADMIN — Medication 35 MILLICURIE: at 09:32

## 2020-09-14 RX ADMIN — Medication 10.7 MILLICURIE: at 08:37

## 2020-09-18 ENCOUNTER — TELEPHONE (OUTPATIENT)
Dept: CASE MANAGEMENT | Age: 70
End: 2020-09-18

## 2020-09-18 NOTE — TELEPHONE ENCOUNTER
Name: Joshua Del Rio  : 1950  MRN: [id number]    Oncology Navigation Follow-Up Note    Contact Type:  Telephone    Notes: Xuan- from genetic office attempted to contact patient for scheduling x 3. Called patient to see if still wanting eval. States \"yes, may not have recognized number. \" Verbalizes understanding of number and patient states will call to schedule.     Electronically signed by João Blanton RN on 2020 at 3:12 PM

## 2020-10-09 ENCOUNTER — TELEPHONE (OUTPATIENT)
Dept: ONCOLOGY | Age: 70
End: 2020-10-09

## 2020-10-09 NOTE — TELEPHONE ENCOUNTER
Informed Ella Flores RN that we have not heard back from patient to schedule genetic evaluation appointment. I called patient personally to make sure she had our number to schedule and answer any questions. I left a voicemail encouraging her to call back directly if she is interested in proceeding.  Otherwise, we will close her referral.

## 2020-10-20 ENCOUNTER — HOSPITAL ENCOUNTER (OUTPATIENT)
Dept: ULTRASOUND IMAGING | Age: 70
Discharge: HOME | End: 2020-10-20
Payer: MEDICARE

## 2020-10-20 PROCEDURE — 76705 ECHO EXAM OF ABDOMEN: CPT

## 2020-11-06 RX ORDER — ATENOLOL 25 MG/1
TABLET ORAL
Qty: 45 TABLET | Refills: 3 | Status: SHIPPED | OUTPATIENT
Start: 2020-11-06 | End: 2022-01-10 | Stop reason: SDUPTHER

## 2020-11-06 NOTE — TELEPHONE ENCOUNTER
Nargis Chan called requesting a refill on the following medications:  Requested Prescriptions     Pending Prescriptions Disp Refills    atenolol (TENORMIN) 25 MG tablet 45 tablet 3     Sig: take 1/2 tablet by mouth once daily     [facility]   . pv      Date of last visit: 8/24/2020   Date of next visit (if applicable): Visit date not found

## 2020-11-11 ENCOUNTER — HOSPITAL ENCOUNTER (OUTPATIENT)
Age: 70
Discharge: HOME | End: 2020-11-11
Payer: MEDICARE

## 2020-11-11 LAB
ALBUMIN SERPL-MCNC: 4.2 G/DL (ref 3.5–5.1)
ALP BLD-CCNC: 88 U/L (ref 38–126)
ALT SERPL-CCNC: 9 U/L (ref 11–66)
ANION GAP SERPL CALCULATED.3IONS-SCNC: 10 MEQ/L (ref 8–16)
AST SERPL-CCNC: 11 U/L (ref 5–40)
BACTERIA: ABNORMAL
BASOPHILS # BLD: 0.4 %
BASOPHILS ABSOLUTE: 0 THOU/MM3 (ref 0–0.1)
BILIRUB SERPL-MCNC: 0.9 MG/DL (ref 0.3–1.2)
BILIRUBIN URINE: NEGATIVE
BLOOD, URINE: NEGATIVE
BUN BLDV-MCNC: 17 MG/DL (ref 7–22)
CALCIUM SERPL-MCNC: 9.6 MG/DL (ref 8.5–10.5)
CASTS: ABNORMAL /LPF
CASTS: ABNORMAL /LPF
CHARACTER, URINE: CLEAR
CHLORIDE BLD-SCNC: 101 MEQ/L (ref 98–111)
CHOLESTEROL, TOTAL: 188 MG/DL (ref 100–199)
CO2: 28 MEQ/L (ref 23–33)
COLOR: YELLOW
CREAT SERPL-MCNC: 0.8 MG/DL (ref 0.4–1.2)
CREATININE, URINE: 91.4 MG/DL
CRYSTALS: ABNORMAL
EOSINOPHIL # BLD: 1.1 %
EOSINOPHILS ABSOLUTE: 0.1 THOU/MM3 (ref 0–0.4)
EPITHELIAL CELLS, UA: ABNORMAL /HPF
ERYTHROCYTE [DISTWIDTH] IN BLOOD BY AUTOMATED COUNT: 14 % (ref 11.5–14.5)
ERYTHROCYTE [DISTWIDTH] IN BLOOD BY AUTOMATED COUNT: 46 FL (ref 35–45)
GFR SERPL CREATININE-BSD FRML MDRD: 71 ML/MIN/1.73M2
GLUCOSE BLD-MCNC: 131 MG/DL (ref 70–108)
GLUCOSE, URINE: NEGATIVE MG/DL
HCT VFR BLD CALC: 45.5 % (ref 37–47)
HDLC SERPL-MCNC: 49 MG/DL
HEMOGLOBIN: 14.7 GM/DL (ref 12–16)
IMMATURE GRANS (ABS): 0.03 THOU/MM3 (ref 0–0.07)
IMMATURE GRANULOCYTES: 0.3 %
KETONES, URINE: NEGATIVE
LDL CHOLESTEROL CALCULATED: 109 MG/DL
LEUKOCYTE EST, POC: ABNORMAL
LYMPHOCYTES # BLD: 25.6 %
LYMPHOCYTES ABSOLUTE: 2.4 THOU/MM3 (ref 1–4.8)
MCH RBC QN AUTO: 29.2 PG (ref 26–33)
MCHC RBC AUTO-ENTMCNC: 32.3 GM/DL (ref 32.2–35.5)
MCV RBC AUTO: 90.3 FL (ref 81–99)
MICROALBUMIN UR-MCNC: < 1.2 MG/DL
MICROALBUMIN/CREAT UR-RTO: 13 MG/G (ref 0–30)
MISCELLANEOUS LAB TEST RESULT: ABNORMAL
MONOCYTES # BLD: 7.1 %
MONOCYTES ABSOLUTE: 0.7 THOU/MM3 (ref 0.4–1.3)
NITRITE, URINE: NEGATIVE
NUCLEATED RED BLOOD CELLS: 0 /100 WBC
PH UA: 7 (ref 5–9)
PLATELET # BLD: 282 THOU/MM3 (ref 130–400)
PMV BLD AUTO: 9.4 FL (ref 9.4–12.4)
POTASSIUM SERPL-SCNC: 4.8 MEQ/L (ref 3.5–5.2)
PROTEIN UA: NEGATIVE MG/DL
RBC # BLD: 5.04 MILL/MM3 (ref 4.2–5.4)
RBC URINE: ABNORMAL /HPF
RENAL EPITHELIAL, UA: ABNORMAL
SEG NEUTROPHILS: 65.5 %
SEGMENTED NEUTROPHILS ABSOLUTE COUNT: 6.1 THOU/MM3 (ref 1.8–7.7)
SODIUM BLD-SCNC: 139 MEQ/L (ref 135–145)
SPECIFIC GRAVITY UA: 1.01 (ref 1–1.03)
T4 FREE: 1.08 NG/DL (ref 0.93–1.76)
TOTAL PROTEIN: 7.1 G/DL (ref 6.1–8)
TRIGL SERPL-MCNC: 149 MG/DL (ref 0–199)
TSH SERPL DL<=0.05 MIU/L-ACNC: 2.37 UIU/ML (ref 0.4–4.2)
UROBILINOGEN, URINE: 1 EU/DL (ref 0–1)
VITAMIN D 25-HYDROXY: 24 NG/ML (ref 30–100)
WBC # BLD: 9.3 THOU/MM3 (ref 4.8–10.8)
WBC UA: ABNORMAL /HPF
YEAST: ABNORMAL

## 2020-11-11 PROCEDURE — 85025 COMPLETE CBC W/AUTO DIFF WBC: CPT

## 2020-11-11 PROCEDURE — 80053 COMPREHEN METABOLIC PANEL: CPT

## 2020-11-11 PROCEDURE — 84439 ASSAY OF FREE THYROXINE: CPT

## 2020-11-11 PROCEDURE — 82043 UR ALBUMIN QUANTITATIVE: CPT

## 2020-11-11 PROCEDURE — 81001 URINALYSIS AUTO W/SCOPE: CPT

## 2020-11-11 PROCEDURE — 36415 COLL VENOUS BLD VENIPUNCTURE: CPT

## 2020-11-11 PROCEDURE — 84443 ASSAY THYROID STIM HORMONE: CPT

## 2020-11-11 PROCEDURE — 80061 LIPID PANEL: CPT

## 2020-11-11 PROCEDURE — 82306 VITAMIN D 25 HYDROXY: CPT

## 2021-06-30 ENCOUNTER — HOSPITAL ENCOUNTER (OUTPATIENT)
Dept: WOMENS IMAGING | Age: 71
Discharge: HOME | End: 2021-06-30
Payer: MEDICARE

## 2021-06-30 DIAGNOSIS — Z12.31 VISIT FOR SCREENING MAMMOGRAM: ICD-10-CM

## 2021-06-30 PROCEDURE — 77063 BREAST TOMOSYNTHESIS BI: CPT

## 2021-09-01 ENCOUNTER — OFFICE VISIT (OUTPATIENT)
Dept: CARDIOLOGY CLINIC | Age: 71
End: 2021-09-01
Payer: MEDICARE

## 2021-09-01 VITALS
DIASTOLIC BLOOD PRESSURE: 80 MMHG | WEIGHT: 293 LBS | HEART RATE: 76 BPM | BODY MASS INDEX: 44.41 KG/M2 | HEIGHT: 68 IN | SYSTOLIC BLOOD PRESSURE: 142 MMHG

## 2021-09-01 DIAGNOSIS — I10 ESSENTIAL HYPERTENSION: ICD-10-CM

## 2021-09-01 DIAGNOSIS — E78.01 FAMILIAL HYPERCHOLESTEROLEMIA: ICD-10-CM

## 2021-09-01 DIAGNOSIS — I25.10 CORONARY ARTERY DISEASE INVOLVING NATIVE CORONARY ARTERY OF NATIVE HEART WITHOUT ANGINA PECTORIS: Primary | ICD-10-CM

## 2021-09-01 PROCEDURE — 93000 ELECTROCARDIOGRAM COMPLETE: CPT | Performed by: NUCLEAR MEDICINE

## 2021-09-01 PROCEDURE — 99213 OFFICE O/P EST LOW 20 MIN: CPT | Performed by: NUCLEAR MEDICINE

## 2021-09-01 RX ORDER — ALENDRONATE SODIUM 35 MG/1
35 TABLET ORAL
Status: ON HOLD | COMMUNITY
End: 2021-09-11 | Stop reason: HOSPADM

## 2021-09-08 ENCOUNTER — APPOINTMENT (OUTPATIENT)
Dept: CT IMAGING | Age: 71
End: 2021-09-08
Payer: MEDICARE

## 2021-09-08 ENCOUNTER — HOSPITAL ENCOUNTER (OUTPATIENT)
Age: 71
Setting detail: OBSERVATION
Discharge: HOME | End: 2021-09-11
Attending: EMERGENCY MEDICINE | Admitting: INTERNAL MEDICINE
Payer: MEDICARE

## 2021-09-08 DIAGNOSIS — K44.9 HIATAL HERNIA: ICD-10-CM

## 2021-09-08 DIAGNOSIS — R13.10 DYSPHAGIA, UNSPECIFIED TYPE: Primary | ICD-10-CM

## 2021-09-08 LAB
ANION GAP SERPL CALCULATED.3IONS-SCNC: 11 MEQ/L (ref 8–16)
BASOPHILS # BLD: 0.6 %
BASOPHILS ABSOLUTE: 0 THOU/MM3 (ref 0–0.1)
BUN BLDV-MCNC: 10 MG/DL (ref 7–22)
CALCIUM SERPL-MCNC: 9.6 MG/DL (ref 8.5–10.5)
CHLORIDE BLD-SCNC: 104 MEQ/L (ref 98–111)
CO2: 26 MEQ/L (ref 23–33)
CREAT SERPL-MCNC: 0.7 MG/DL (ref 0.4–1.2)
EOSINOPHIL # BLD: 3.1 %
EOSINOPHILS ABSOLUTE: 0.2 THOU/MM3 (ref 0–0.4)
ERYTHROCYTE [DISTWIDTH] IN BLOOD BY AUTOMATED COUNT: 13.2 % (ref 11.5–14.5)
ERYTHROCYTE [DISTWIDTH] IN BLOOD BY AUTOMATED COUNT: 43.5 FL (ref 35–45)
GFR SERPL CREATININE-BSD FRML MDRD: 82 ML/MIN/1.73M2
GLUCOSE BLD-MCNC: 110 MG/DL (ref 70–108)
GLUCOSE BLD-MCNC: 148 MG/DL (ref 70–108)
HCT VFR BLD CALC: 42.1 % (ref 37–47)
HEMOGLOBIN: 13.9 GM/DL (ref 12–16)
IMMATURE GRANS (ABS): 0.02 THOU/MM3 (ref 0–0.07)
IMMATURE GRANULOCYTES: 0.4 %
LYMPHOCYTES # BLD: 28.5 %
LYMPHOCYTES ABSOLUTE: 1.5 THOU/MM3 (ref 1–4.8)
MCH RBC QN AUTO: 29.5 PG (ref 26–33)
MCHC RBC AUTO-ENTMCNC: 33 GM/DL (ref 32.2–35.5)
MCV RBC AUTO: 89.4 FL (ref 81–99)
MONOCYTES # BLD: 9.4 %
MONOCYTES ABSOLUTE: 0.5 THOU/MM3 (ref 0.4–1.3)
NUCLEATED RED BLOOD CELLS: 0 /100 WBC
OSMOLALITY CALCULATION: 283.1 MOSMOL/KG (ref 275–300)
PLATELET # BLD: 227 THOU/MM3 (ref 130–400)
PMV BLD AUTO: 9.7 FL (ref 9.4–12.4)
POTASSIUM REFLEX MAGNESIUM: 4 MEQ/L (ref 3.5–5.2)
RBC # BLD: 4.71 MILL/MM3 (ref 4.2–5.4)
SEG NEUTROPHILS: 58 %
SEGMENTED NEUTROPHILS ABSOLUTE COUNT: 3 THOU/MM3 (ref 1.8–7.7)
SODIUM BLD-SCNC: 141 MEQ/L (ref 135–145)
TROPONIN T: < 0.01 NG/ML
WBC # BLD: 5.2 THOU/MM3 (ref 4.8–10.8)

## 2021-09-08 PROCEDURE — 6360000002 HC RX W HCPCS: Performed by: REGISTERED NURSE

## 2021-09-08 PROCEDURE — 84484 ASSAY OF TROPONIN QUANT: CPT

## 2021-09-08 PROCEDURE — 85025 COMPLETE CBC W/AUTO DIFF WBC: CPT

## 2021-09-08 PROCEDURE — 6360000002 HC RX W HCPCS

## 2021-09-08 PROCEDURE — 71250 CT THORAX DX C-: CPT

## 2021-09-08 PROCEDURE — 6360000002 HC RX W HCPCS: Performed by: INTERNAL MEDICINE

## 2021-09-08 PROCEDURE — G0378 HOSPITAL OBSERVATION PER HR: HCPCS | Performed by: INTERNAL MEDICINE

## 2021-09-08 PROCEDURE — 96372 THER/PROPH/DIAG INJ SC/IM: CPT

## 2021-09-08 PROCEDURE — 93005 ELECTROCARDIOGRAM TRACING: CPT | Performed by: EMERGENCY MEDICINE

## 2021-09-08 PROCEDURE — 2500000003 HC RX 250 WO HCPCS: Performed by: EMERGENCY MEDICINE

## 2021-09-08 PROCEDURE — 6360000004 HC RX CONTRAST MEDICATION: Performed by: REGISTERED NURSE

## 2021-09-08 PROCEDURE — G0378 HOSPITAL OBSERVATION PER HR: HCPCS

## 2021-09-08 PROCEDURE — 2580000003 HC RX 258: Performed by: REGISTERED NURSE

## 2021-09-08 PROCEDURE — 96374 THER/PROPH/DIAG INJ IV PUSH: CPT

## 2021-09-08 PROCEDURE — 96375 TX/PRO/DX INJ NEW DRUG ADDON: CPT

## 2021-09-08 PROCEDURE — 80048 BASIC METABOLIC PNL TOTAL CA: CPT

## 2021-09-08 PROCEDURE — 36415 COLL VENOUS BLD VENIPUNCTURE: CPT

## 2021-09-08 PROCEDURE — 74177 CT ABD & PELVIS W/CONTRAST: CPT

## 2021-09-08 PROCEDURE — 82948 REAGENT STRIP/BLOOD GLUCOSE: CPT

## 2021-09-08 PROCEDURE — 99283 EMERGENCY DEPT VISIT LOW MDM: CPT

## 2021-09-08 RX ORDER — KETOROLAC TROMETHAMINE 30 MG/ML
INJECTION, SOLUTION INTRAMUSCULAR; INTRAVENOUS
Status: COMPLETED
Start: 2021-09-08 | End: 2021-09-08

## 2021-09-08 RX ORDER — DEXTROSE MONOHYDRATE 50 MG/ML
100 INJECTION, SOLUTION INTRAVENOUS PRN
Status: DISCONTINUED | OUTPATIENT
Start: 2021-09-08 | End: 2021-09-11 | Stop reason: HOSPADM

## 2021-09-08 RX ORDER — NICOTINE POLACRILEX 4 MG
15 LOZENGE BUCCAL PRN
Status: DISCONTINUED | OUTPATIENT
Start: 2021-09-08 | End: 2021-09-11 | Stop reason: HOSPADM

## 2021-09-08 RX ORDER — PANTOPRAZOLE SODIUM 40 MG/1
40 TABLET, DELAYED RELEASE ORAL
Status: DISCONTINUED | OUTPATIENT
Start: 2021-09-09 | End: 2021-09-08

## 2021-09-08 RX ORDER — DULOXETIN HYDROCHLORIDE 30 MG/1
30 CAPSULE, DELAYED RELEASE ORAL DAILY
Status: DISCONTINUED | OUTPATIENT
Start: 2021-09-08 | End: 2021-09-11 | Stop reason: HOSPADM

## 2021-09-08 RX ORDER — ATENOLOL 25 MG/1
25 TABLET ORAL DAILY
Status: DISCONTINUED | OUTPATIENT
Start: 2021-09-08 | End: 2021-09-11 | Stop reason: HOSPADM

## 2021-09-08 RX ORDER — SODIUM CHLORIDE 9 MG/ML
25 INJECTION, SOLUTION INTRAVENOUS PRN
Status: DISCONTINUED | OUTPATIENT
Start: 2021-09-08 | End: 2021-09-11 | Stop reason: HOSPADM

## 2021-09-08 RX ORDER — SODIUM CHLORIDE 0.9 % (FLUSH) 0.9 %
5-40 SYRINGE (ML) INJECTION PRN
Status: DISCONTINUED | OUTPATIENT
Start: 2021-09-08 | End: 2021-09-11 | Stop reason: HOSPADM

## 2021-09-08 RX ORDER — PANTOPRAZOLE SODIUM 40 MG/10ML
40 INJECTION, POWDER, LYOPHILIZED, FOR SOLUTION INTRAVENOUS DAILY
Status: DISCONTINUED | OUTPATIENT
Start: 2021-09-08 | End: 2021-09-09

## 2021-09-08 RX ORDER — ATORVASTATIN CALCIUM 10 MG/1
10 TABLET, FILM COATED ORAL DAILY
Status: DISCONTINUED | OUTPATIENT
Start: 2021-09-08 | End: 2021-09-11 | Stop reason: HOSPADM

## 2021-09-08 RX ORDER — KETOROLAC TROMETHAMINE 30 MG/ML
15 INJECTION, SOLUTION INTRAMUSCULAR; INTRAVENOUS ONCE
Status: COMPLETED | OUTPATIENT
Start: 2021-09-08 | End: 2021-09-08

## 2021-09-08 RX ORDER — SODIUM CHLORIDE 0.9 % (FLUSH) 0.9 %
5-40 SYRINGE (ML) INJECTION EVERY 12 HOURS SCHEDULED
Status: DISCONTINUED | OUTPATIENT
Start: 2021-09-08 | End: 2021-09-11 | Stop reason: HOSPADM

## 2021-09-08 RX ORDER — PROMETHAZINE HYDROCHLORIDE 25 MG/1
12.5 TABLET ORAL EVERY 6 HOURS PRN
Status: DISCONTINUED | OUTPATIENT
Start: 2021-09-08 | End: 2021-09-11 | Stop reason: HOSPADM

## 2021-09-08 RX ORDER — ONDANSETRON 2 MG/ML
4 INJECTION INTRAMUSCULAR; INTRAVENOUS EVERY 6 HOURS PRN
Status: DISCONTINUED | OUTPATIENT
Start: 2021-09-08 | End: 2021-09-11 | Stop reason: HOSPADM

## 2021-09-08 RX ORDER — DEXTROSE MONOHYDRATE 25 G/50ML
12.5 INJECTION, SOLUTION INTRAVENOUS PRN
Status: DISCONTINUED | OUTPATIENT
Start: 2021-09-08 | End: 2021-09-11 | Stop reason: HOSPADM

## 2021-09-08 RX ORDER — HYDRALAZINE HYDROCHLORIDE 20 MG/ML
10 INJECTION INTRAMUSCULAR; INTRAVENOUS EVERY 6 HOURS PRN
Status: DISCONTINUED | OUTPATIENT
Start: 2021-09-08 | End: 2021-09-11 | Stop reason: HOSPADM

## 2021-09-08 RX ORDER — ROPINIROLE 0.5 MG/1
0.5 TABLET, FILM COATED ORAL NIGHTLY
Status: DISCONTINUED | OUTPATIENT
Start: 2021-09-08 | End: 2021-09-11 | Stop reason: HOSPADM

## 2021-09-08 RX ADMIN — GLUCAGON HYDROCHLORIDE 1 MG: 1 INJECTION, POWDER, FOR SOLUTION INTRAMUSCULAR; INTRAVENOUS; SUBCUTANEOUS at 13:22

## 2021-09-08 RX ADMIN — ENOXAPARIN SODIUM 40 MG: 40 INJECTION SUBCUTANEOUS at 22:42

## 2021-09-08 RX ADMIN — KETOROLAC TROMETHAMINE 15 MG: 30 INJECTION, SOLUTION INTRAMUSCULAR; INTRAVENOUS at 16:42

## 2021-09-08 RX ADMIN — HYDRALAZINE HYDROCHLORIDE 10 MG: 20 INJECTION INTRAMUSCULAR; INTRAVENOUS at 22:42

## 2021-09-08 RX ADMIN — SODIUM CHLORIDE, PRESERVATIVE FREE 10 ML: 5 INJECTION INTRAVENOUS at 22:42

## 2021-09-08 RX ADMIN — IOPAMIDOL 80 ML: 755 INJECTION, SOLUTION INTRAVENOUS at 15:28

## 2021-09-08 ASSESSMENT — PAIN SCALES - GENERAL
PAINLEVEL_OUTOF10: 7
PAINLEVEL_OUTOF10: 0
PAINLEVEL_OUTOF10: 3

## 2021-09-08 ASSESSMENT — PAIN DESCRIPTION - PAIN TYPE: TYPE: ACUTE PAIN

## 2021-09-08 NOTE — ED PROVIDER NOTES
[facility] EMERGENCY DEPT      CHIEF COMPLAINT       Chief Complaint   Patient presents with    Other     food stuck in throat       Nurses Notes reviewed and I agree except as noted in the HPI. HISTORY OF PRESENT ILLNESS    Myrna Carmichael is a 70 y.o. female who presents with complaint of feeling like food is stuck in her throat, patient states that she was eating coconut chips proximately 30 hours ago and it felt like a chip got stuck in her throat. She is able to swallow some saliva, states that whenever she tries to drink water some of it comes back up. Patient has no history of dysphagia, had an EGD in 2018. Onset: Acute  Duration: 30 hours  Timing: Persistent  Location of Pain: Throat pain  Intesity/severity: Able to swallow solids, can swallow some liquids  Modifying Factors: Unknown  Relieved by;  Previous Episodes; Tx Before arrival: None  REVIEW OF SYSTEMS      Review of Systems   Constitutional: Negative for fever, chills, diaphoresis and fatigue. HENT: Negative for congestion, drooling, facial swelling and sore throat. Eyes: Negative for photophobia, pain and discharge. Respiratory: Negative for cough, shortness of breath, wheezing and stridor. Cardiovascular: Negative for chest pain, palpitations and leg swelling. Gastrointestinal: Negative for abdominal pain, blood in stool and abdominal distention. Positive for dysphagia. Genitourinary: Negative for dysuria, urgency, hematuria and difficulty urinating. Musculoskeletal: Negative for gait problem, neck pain and neck stiffness. Skin; No rash, No itching  Neurological: Negative for seizures, weakness and numbness. Hematological: Negative for adenopathy. Does not bruise/bleed easily.      PAST MEDICAL HISTORY    has a past medical history of Abdominal hernia, Cancer ([address] SCOTT (dyspnea on exertion), GERD (gastroesophageal reflux disease), Headache, Hyperglycemia, Hyperlipidemia, Migraine headache, Morbid obesity ([address] Osteopenia, RLS (restless legs syndrome), Type 1 diabetes mellitus ([address] and UTI (urinary tract infection). SURGICAL HISTORY      has a past surgical history that includes Carpal tunnel release (Bilateral, 1990); Bunionectomy (Right, ); Hysterectomy (); Knee Arthroplasty (Left, ); Knee Arthroplasty (Right, ); Hip Arthroplasty (Left, 2015); joint replacement (Right); Colonoscopy (2017); Upper gastrointestinal endoscopy; Upper gastrointestinal endoscopy (N/A, 2018); Gastric fundoplication (N/A, ); hip surgery; hernia repair; and Vein Surgery (Bilateral, 2019). CURRENT MEDICATIONS       Previous Medications    ALENDRONATE (FOSAMAX) 35 MG TABLET    Take 35 mg by mouth every 7 days    ATENOLOL (TENORMIN) 25 MG TABLET    take 1/2 tablet by mouth once daily    ATORVASTATIN (LIPITOR) 10 MG TABLET    Take 10 mg by mouth daily. DULOXETINE (CYMBALTA) 30 MG EXTENDED RELEASE CAPSULE    Take 30 mg by mouth daily    MELATONIN 3 MG TABS TABLET    Take 1 tablet by mouth nightly as needed (insomnia)    METFORMIN (GLUCOPHAGE) 500 MG TABLET    Take 500 mg by mouth 2 times daily (with meals)     NAPROXEN (NAPROSYN) 500 MG TABLET    Take 500 mg by mouth as needed for Pain    PREVAGEN 10 MG CAPS    Take by mouth    PROBIOTIC PRODUCT (PROBIOTIC DAILY PO)    Take by mouth daily    ROPINIROLE (REQUIP) 0.5 MG TABLET    Take 0.5 mg by mouth nightly. SUMATRIPTAN (IMITREX) 50 MG TABLET    Take 50 mg by mouth once as needed for Migraine    VITAMIN D PO    Take by mouth daily       ALLERGIES     has No Known Allergies. FAMILY HISTORY     She indicated that her mother is . She indicated that her father is . She indicated that her sister is . She indicated that her maternal grandmother is . She indicated that her maternal grandfather is .    family history includes Arthritis in her maternal grandfather, maternal grandmother, and mother; Breast Cancer (age of onset: 68) in her maternal grandmother; Cancer in her maternal grandfather and mother; High Blood Pressure in her mother; Obesity in her mother; Ovarian Cancer (age of onset: 48) in her sister. SOCIAL HISTORY      reports that she has never smoked. She has never used smokeless tobacco. She reports that she does not drink alcohol and does not use drugs. PHYSICAL EXAM     INITIAL VITALS:  height is 5' 8\" (1.727 m) and weight is 315 lb (142.9 kg) (abnormal). Her oral temperature is 98.4 °F (36.9 °C). Her blood pressure is 163/83 (abnormal) and her pulse is 71. Her respiration is 17 and oxygen saturation is 95%. Physical Exam   Constitutional:  well-developed and well-nourished. HENT: Head: Normocephalic, atraumatic, Bilateral external ears normal, Oropharynx mosit, No oral exudates, Nose normal.   Eyes: PERRL, EOMI, Conjunctiva normal, No discharge. No scleral icterus  Neck: Normal range of motion, No tenderness, Supple  Cardiovascular: Normal rate, regular rhythm, S1 normal and S2 normal.  Exam reveals no gallop. Pulmonary/Chest: Effort normal and breath sounds normal. No accessory muscle usage or stridor. No respiratory distress. no wheezes. has no rales. exhibits no tenderness. Abdominal: Soft. Bowel sounds are normal.  exhibits no distension. There is no tenderness. There is no rebound and no guarding. Extremities: No edema, no tenderness, no cyanosis, no clubbing. Musculoskeletal: Good range of motion in major joints is observed. No major deformities noted. Neurological: Alert and oriented ×3, normal motor function, normal sensory function, no focal deficits. GCS 15  Skin: Skin is warm, dry and intact. No rash noted. No erythema.    Psychiatric: Affect normal, judgment normal, mood normal.  DIFFERENTIAL DIAGNOSIS:       DIAGNOSTIC RESULTS     EKG: All EKG's are interpreted by the Emergency Department Physician who either signs or Co-signs this chart in the absence of a cardiologist.      RADIOLOGY: non-plain film images(s) such as CT, Ultrasound and MRI are read by the radiologist.  Plain radiographic images are visualized and preliminarily interpreted by the emergency physician unless otherwise stated below. LABS:   Labs Reviewed   BASIC METABOLIC PANEL W/ REFLEX TO MG FOR LOW K - Abnormal; Notable for the following components:       Result Value    Glucose 148 (*)     All other components within normal limits   GLOMERULAR FILTRATION RATE, ESTIMATED - Abnormal; Notable for the following components:    Est, Glom Filt Rate 82 (*)     All other components within normal limits   CBC WITH AUTO DIFFERENTIAL   TROPONIN   ANION GAP   OSMOLALITY   BASIC METABOLIC PANEL W/ REFLEX TO MG FOR LOW K   CBC WITH AUTO DIFFERENTIAL   POCT GLUCOSE   POCT GLUCOSE   POCT GLUCOSE       EMERGENCY DEPARTMENT COURSE:   Vitals:    Vitals:    09/08/21 1435 09/08/21 1539 09/08/21 1639 09/08/21 1739   BP: (!) 163/78 (!) 169/86 (!) 175/96 (!) 163/83   Pulse: 71 72 68 71   Resp: 18 18 17 17   Temp:       TempSrc:       SpO2: 95% 97% 96% 95%   Weight:       Height:             CRITICAL CARE:       CONSULTS:  None    PROCEDURES:  None    FINAL IMPRESSION      1. Dysphagia, unspecified type    2. Hiatal hernia          DISPOSITION/PLAN   Admitted    PATIENT REFERRED TO:  No follow-up provider specified.     DISCHARGE MEDICATIONS:  New Prescriptions    No medications on file       (Please note that portions of this note were completed with a voice recognition program.  Efforts were made to edit the dictations but occasionally words are mis-transcribed.)    DO Amelia Reynoso DO  09/08/21 1918

## 2021-09-08 NOTE — H&P
[facility]  H&P  9/8/2021  3:17 PM    Patient:  Siva Keller  YOB: 1950    MRN: [id number]   Acct:  [de-identified]   35/035A  Primary Care Physician: Evans Mendoza, APRN - CNP    Chief Complaint:  Chief Complaint   Patient presents with    Other     food stuck in throat       History of Present Illness: The patient is a 70 y.o. female with pmhx of hiatal hernia s/p nissen fundoplication 9277, HLD, CAD, morbid obesity, DM, hx esophageal dilatations who presents with [place]elling as though there is a \"coconut in her throat\" for the past 30 hours. Pt states that she has had reflux before but this feels different and every time she drinks water it comes right back up. She has only been able to get down 1/2 slice of toast since the feeling started. Pt does have nausea but no vomiting, diarrhea, chest pain or SOB. She does have slight headache. Pt also reports 8/10 abdominal pain left side that started a few hours ago since coming to the ED. In the emergency department CT chest shows moderate hiatal hernia with gastroesophageal reflux up to the point of the thoracic outlet, hepatic steatosis and cholelithiasis. All other labs unremarkable. Both GI and Gen surg were spoken to by ED provider prior to admission. Patient admitted for possible food impaction and evaluation of hiatal hernia. Pt remains a full code. Past Medical History:        Diagnosis Date    Abdominal hernia     Cancer ([address]     skin    SCOTT (dyspnea on exertion)     false positive stress test 2014 with normal cardiac cath 2014.     GERD (gastroesophageal reflux disease)     Headache     Migraines    Hyperglycemia 2015    borderline takes Metformin    Hyperlipidemia     Migraine headache     Morbid obesity (HCC)     Osteopenia     RLS (restless legs syndrome)     Type 1 diabetes mellitus (HCC)     UTI (urinary tract infection)     history of       Past Surgical History:        Procedure Laterality Date    BUNIONECTOMY Right 1980's    CARPAL TUNNEL RELEASE Bilateral 1990 2008 1991 2016     x2 right x2 left    [facility]    GASTRIC FUNDOPLICATION N/A 89/4/2315    ROBOTIC HIATAL HERNIA WITH NISSEN FUNDOPLICATION performed [facility] Left 02/2015    HIP SURGERY      HYSTERECTOMY  1980    JOINT REPLACEMENT Right     KNEE ARTHROPLASTY Left 2007    KNEE ARTHROPLASTY Right 2014    UPPER GASTROINTESTINAL ENDOSCOPY      UPPER GASTROINTESTINAL ENDOSCOPY N/A 11/13/2018    EGD DIAGNOSTIC ONLY performed by Victoria Quinn MD at [facility]   [place] Bilateral 2019       Home Medications: Not in a hospital admission. Allergies:  Patient has no known allergies. Social History:    reports that she has never smoked. She has never used smokeless tobacco. She reports that she does not drink alcohol and does not use drugs.       Family History:       Problem Relation Age of Onset    Arthritis Mother     High Blood Pressure Mother     Cancer Mother     Obesity Mother     Ovarian Cancer Sister 48    Arthritis Maternal Grandmother     Breast Cancer Maternal Grandmother 68    Cancer Maternal Grandfather         colon    Arthritis Maternal Grandfather        Review of Systems:    General ROS: negative  Psychological ROS: negative  Hematological and Lymphatic ROS: negative  Endocrine ROS: negative  Vision:negative  ENT ROS: Denies  Respiratory ROS: no cough, shortness of breath, or wheezing  Cardiovascular ROS: no chest pain or dyspnea on exertion  Gastrointestinal ROS: abdominal pain left side, feels as though something is stuck in her throat  Genito-Urinary ROS: no dysuria, trouble voiding, or hematuria  Musculoskeletal ROS: negative  Neurological ROS: has headache  Dermatological ROS: negative  Weight:no change in weight  Appetite: decreased      Physical Exam:    Vitals:  Patient Vitals for the past 24 hrs:   BP Temp Temp src Pulse Resp SpO2 Height Weight   09/08/21 1435 (!) 163/78 -- -- 71 18 95 % -- --   09/08/21 1335 (!) 162/89 -- -- 69 17 96 % -- --   09/08/21 1225 133/87 98.4 °F (36.9 °C) Oral 87 16 97 % 5' 8\" (1.727 m) (!) 315 lb (142.9 kg)     Weight: Weight: (!) 315 lb (142.9 kg)     24 hour intake/output:No intake or output data in the 24 hours ending 09/08/21 1517    General appearance - alert, well appearing, and in no distress  Eyes - pupils equal and reactive, extraocular eye movements intact  Ears - bilateral TM's and external ear canals normal  Nose - normal and patent, no erythema, discharge or polyps  Mouth - mucous membranes moist, pharynx normal without lesions  Neck - supple, no significant adenopathy  Lymphatics - no palpable lymphadenopathy, no hepatosplenomegaly  Chest - clear to auscultation, no wheezes, rales or rhonchi, symmetric air entry  Distant Breath Sounds: No  Heart - normal rate, regular rhythm, normal S1, S2, no murmurs, rubs, clicks or gallops  Abdomen - soft, non distended, painful on palpation to left side  Morbidly obese: yes; Neurological - alert, oriented, normal speech, no focal findings or movement disorder noted  Musculoskeletal - no joint tenderness, deformity or swelling  Extremities - peripheral pulses normal, no pedal edema, no clubbing or cyanosis  Skin - normal coloration and turgor, no rashes, no suspicious skin lesions noted    Review of Labs and Diagnostic Testing:    CBC:   Recent Labs     09/08/21  1259   WBC 5.2   HGB 13.9   HCT 42.1   MCV 89.4        BMP:   Recent Labs     09/08/21  1259      K 4.0      CO2 26   BUN 10   CREATININE 0.7   CALCIUM 9.6   GLUCOSE 148*     PT/INR: No results for input(s): PROTIME, INR in the last 72 hours. APTT: No results for input(s): APTT in the last 72 hours. Lipids: No results for input(s): ALKPHOS, ALT, AST, BILITOT, BILIDIR, LABALBU, AMYLASE, LIPASE in the last 72 hours.   Troponin:   Recent Labs 09/08/21  1259   TROPONINT < 0.010        Imaging:  CT CHEST WO CONTRAST    Result Date: 9/8/2021  PROCEDURE: CT CHEST WO CONTRAST CLINICAL INFORMATION: dsyphagia. COMPARISON: CT chest dated 5/28/2014. TECHNIQUE: 5 mm noncontrast axial images were obtained through the chest. Sagittal and coronal reconstructions were created. All CT scans at this facility use dose modulation, iterative reconstruction, and/or weight-based dosing when appropriate to reduce radiation dose to as low as reasonably achievable. FINDINGS: There is a stable calcified nodule in the left upper lobe and left hilar and mediastinal calcifications is evidence for old granulomatous disease. A 2 mm nodule in the lingular region of the left lung is also stable compared to prior exam. There is a linear opacity at the left lung base, stable compared to prior exam as evidence for recurrent atelectasis or scar. The lungs otherwise appear clear. No axillary, mediastinal or hilar lymphadenopathy is identified. There is a trace pericardial effusion, stable compared to prior exam. The unopacified heart is unremarkable. There is a moderate-sized hiatal hernia, stable compared to prior exam. There is fluid within the esophagus extending to the thoracic inlet level. The liver is diffusely hypodense without focal lesion in the visualized portion. There are small hyperdense stones in the visualized portion of the gallbladder. There are a few small scattered calcified granulomas in the visualized portion of the spleen. The pancreas is partially fatty replaced. There are stable degenerative changes of the thoracic spine. 1. No evidence of acute intrathoracic abnormality. 2. Stable moderate hiatal hernia with gastroesophageal reflux to the level of the thoracic inlet. 3. Hepatic steatosis. 4. Cholelithiasis. **This report has been created using voice recognition software. It may contain minor errors which are inherent in voice recognition technology. ** Final report electronically signed by Dr. Susan Long MD on 9/8/2021 1:15 PM        Assessment/Plan:    1. Dysphagia with known hiatal hernia s/p nissen fundoplication 4720  a. NPO   b. Consult both general surgery and GI   c. Holding alendronate as may contribute to dysphagia and esophagitis   d. Start PO Protonix; pt was on before; unsure why medication was d/c  2. Abdominal pain  a. Obtain CT abdomen/pelvis   3. Diabetes Mellitus   a. Holding oral meds while NPO  b. Insulin SS while inpt   4. HLD  a. Cont statin   5. PT/OT  6. DVT prophylaxis   a.  Lovenox    Assessment and plan of care discussed with supervising physician, Dr Margaret Sutotn.      Electronically signed by TIMOTHY Pugh CNP on 9/8/2021 at 3:17 PM         Copy: Primary Care Physician: TIMOTHY Marie CNP    Pt was seen and examined by me in the ER on 9/9  D/w Steffi Merrill  GI consulted for her dysphagia  Abd soft BS heard    Electronically signed by Lynsey Fitch MD on 9/10/2021 at 3:13 PM

## 2021-09-08 NOTE — ED NOTES
Pt resting on cot. Pt stating that her pain is better after medication. Pt respirations unlabored.       Luciano TRUJILLO RN  09/08/21 1051

## 2021-09-08 NOTE — ED TRIAGE NOTES
Presents to ED with c/o coconut being stuck in her throat. Denies troubles breathing. Respirations easy and unlabored. States it has been in there for 30 hours.

## 2021-09-08 NOTE — ED NOTES
Pt medicated per MAR. Pt tolerated well. Pt respirations unlabored.       Luciano PORTILLO, RN  09/08/21 4101

## 2021-09-09 ENCOUNTER — APPOINTMENT (OUTPATIENT)
Dept: INTERVENTIONAL RADIOLOGY/VASCULAR | Age: 71
End: 2021-09-09
Payer: MEDICARE

## 2021-09-09 ENCOUNTER — APPOINTMENT (OUTPATIENT)
Dept: GENERAL RADIOLOGY | Age: 71
End: 2021-09-09
Payer: MEDICARE

## 2021-09-09 ENCOUNTER — ANESTHESIA EVENT (OUTPATIENT)
Dept: ENDOSCOPY | Age: 71
End: 2021-09-09
Payer: MEDICARE

## 2021-09-09 ENCOUNTER — ANESTHESIA (OUTPATIENT)
Dept: ENDOSCOPY | Age: 71
End: 2021-09-09
Payer: MEDICARE

## 2021-09-09 VITALS
OXYGEN SATURATION: 97 % | SYSTOLIC BLOOD PRESSURE: 125 MMHG | DIASTOLIC BLOOD PRESSURE: 66 MMHG | TEMPERATURE: 97 F | RESPIRATION RATE: 16 BRPM

## 2021-09-09 LAB
ANION GAP SERPL CALCULATED.3IONS-SCNC: 11 MEQ/L (ref 8–16)
BASOPHILS # BLD: 0.5 %
BASOPHILS ABSOLUTE: 0 THOU/MM3 (ref 0–0.1)
BUN BLDV-MCNC: 14 MG/DL (ref 7–22)
CALCIUM SERPL-MCNC: 9.1 MG/DL (ref 8.5–10.5)
CHLORIDE BLD-SCNC: 104 MEQ/L (ref 98–111)
CO2: 25 MEQ/L (ref 23–33)
CREAT SERPL-MCNC: 0.6 MG/DL (ref 0.4–1.2)
EKG ATRIAL RATE: 70 BPM
EKG P AXIS: 55 DEGREES
EKG P-R INTERVAL: 144 MS
EKG Q-T INTERVAL: 456 MS
EKG QRS DURATION: 148 MS
EKG QTC CALCULATION (BAZETT): 492 MS
EKG R AXIS: -27 DEGREES
EKG T AXIS: 11 DEGREES
EKG VENTRICULAR RATE: 70 BPM
EOSINOPHIL # BLD: 3 %
EOSINOPHILS ABSOLUTE: 0.2 THOU/MM3 (ref 0–0.4)
ERYTHROCYTE [DISTWIDTH] IN BLOOD BY AUTOMATED COUNT: 13.6 % (ref 11.5–14.5)
ERYTHROCYTE [DISTWIDTH] IN BLOOD BY AUTOMATED COUNT: 44.5 FL (ref 35–45)
GFR SERPL CREATININE-BSD FRML MDRD: > 90 ML/MIN/1.73M2
GLUCOSE BLD-MCNC: 113 MG/DL (ref 70–108)
GLUCOSE BLD-MCNC: 114 MG/DL (ref 70–108)
GLUCOSE BLD-MCNC: 120 MG/DL (ref 70–108)
GLUCOSE BLD-MCNC: 139 MG/DL (ref 70–108)
GLUCOSE BLD-MCNC: 151 MG/DL (ref 70–108)
HCT VFR BLD CALC: 40.4 % (ref 37–47)
HEMOGLOBIN: 13.5 GM/DL (ref 12–16)
IMMATURE GRANS (ABS): 0.01 THOU/MM3 (ref 0–0.07)
IMMATURE GRANULOCYTES: 0.2 %
LYMPHOCYTES # BLD: 27.9 %
LYMPHOCYTES ABSOLUTE: 1.9 THOU/MM3 (ref 1–4.8)
MCH RBC QN AUTO: 30 PG (ref 26–33)
MCHC RBC AUTO-ENTMCNC: 33.4 GM/DL (ref 32.2–35.5)
MCV RBC AUTO: 89.8 FL (ref 81–99)
MONOCYTES # BLD: 9.6 %
MONOCYTES ABSOLUTE: 0.6 THOU/MM3 (ref 0.4–1.3)
NUCLEATED RED BLOOD CELLS: 0 /100 WBC
OSMOLALITY CALCULATION: 281.1 MOSMOL/KG (ref 275–300)
PLATELET # BLD: 190 THOU/MM3 (ref 130–400)
PMV BLD AUTO: 9.8 FL (ref 9.4–12.4)
POTASSIUM REFLEX MAGNESIUM: 3.6 MEQ/L (ref 3.5–5.2)
RBC # BLD: 4.5 MILL/MM3 (ref 4.2–5.4)
SEG NEUTROPHILS: 58.8 %
SEGMENTED NEUTROPHILS ABSOLUTE COUNT: 3.9 THOU/MM3 (ref 1.8–7.7)
SODIUM BLD-SCNC: 140 MEQ/L (ref 135–145)
WBC # BLD: 6.7 THOU/MM3 (ref 4.8–10.8)

## 2021-09-09 PROCEDURE — 6360000002 HC RX W HCPCS: Performed by: INTERNAL MEDICINE

## 2021-09-09 PROCEDURE — 6360000002 HC RX W HCPCS: Performed by: NURSE ANESTHETIST, CERTIFIED REGISTERED

## 2021-09-09 PROCEDURE — 7100000010 HC PHASE II RECOVERY - FIRST 15 MIN: Performed by: INTERNAL MEDICINE

## 2021-09-09 PROCEDURE — 82948 REAGENT STRIP/BLOOD GLUCOSE: CPT

## 2021-09-09 PROCEDURE — C9113 INJ PANTOPRAZOLE SODIUM, VIA: HCPCS | Performed by: INTERNAL MEDICINE

## 2021-09-09 PROCEDURE — 2580000003 HC RX 258: Performed by: REGISTERED NURSE

## 2021-09-09 PROCEDURE — 85025 COMPLETE CBC W/AUTO DIFF WBC: CPT

## 2021-09-09 PROCEDURE — 2709999900 HC NON-CHARGEABLE SUPPLY: Performed by: INTERNAL MEDICINE

## 2021-09-09 PROCEDURE — 73562 X-RAY EXAM OF KNEE 3: CPT

## 2021-09-09 PROCEDURE — 73610 X-RAY EXAM OF ANKLE: CPT

## 2021-09-09 PROCEDURE — 3609012900 HC EGD FOREIGN BODY REMOVAL: Performed by: INTERNAL MEDICINE

## 2021-09-09 PROCEDURE — 3700000001 HC ADD 15 MINUTES (ANESTHESIA): Performed by: INTERNAL MEDICINE

## 2021-09-09 PROCEDURE — 73502 X-RAY EXAM HIP UNI 2-3 VIEWS: CPT

## 2021-09-09 PROCEDURE — 2580000003 HC RX 258: Performed by: INTERNAL MEDICINE

## 2021-09-09 PROCEDURE — 96376 TX/PRO/DX INJ SAME DRUG ADON: CPT

## 2021-09-09 PROCEDURE — 6360000002 HC RX W HCPCS: Performed by: REGISTERED NURSE

## 2021-09-09 PROCEDURE — 6370000000 HC RX 637 (ALT 250 FOR IP): Performed by: INTERNAL MEDICINE

## 2021-09-09 PROCEDURE — G0378 HOSPITAL OBSERVATION PER HR: HCPCS

## 2021-09-09 PROCEDURE — 93005 ELECTROCARDIOGRAM TRACING: CPT | Performed by: INTERNAL MEDICINE

## 2021-09-09 PROCEDURE — 96375 TX/PRO/DX INJ NEW DRUG ADDON: CPT

## 2021-09-09 PROCEDURE — 3700000000 HC ANESTHESIA ATTENDED CARE: Performed by: INTERNAL MEDICINE

## 2021-09-09 PROCEDURE — 93880 EXTRACRANIAL BILAT STUDY: CPT

## 2021-09-09 PROCEDURE — 36415 COLL VENOUS BLD VENIPUNCTURE: CPT

## 2021-09-09 PROCEDURE — 80048 BASIC METABOLIC PNL TOTAL CA: CPT

## 2021-09-09 PROCEDURE — 2500000003 HC RX 250 WO HCPCS: Performed by: NURSE ANESTHETIST, CERTIFIED REGISTERED

## 2021-09-09 PROCEDURE — 2720000010 HC SURG SUPPLY STERILE: Performed by: INTERNAL MEDICINE

## 2021-09-09 PROCEDURE — 73590 X-RAY EXAM OF LOWER LEG: CPT

## 2021-09-09 RX ORDER — SODIUM CHLORIDE 0.9 % (FLUSH) 0.9 %
5-40 SYRINGE (ML) INJECTION EVERY 12 HOURS SCHEDULED
Status: CANCELLED | OUTPATIENT
Start: 2021-09-09

## 2021-09-09 RX ORDER — HYDRALAZINE HYDROCHLORIDE 20 MG/ML
INJECTION INTRAMUSCULAR; INTRAVENOUS
Status: DISPENSED
Start: 2021-09-09 | End: 2021-09-10

## 2021-09-09 RX ORDER — SODIUM CHLORIDE 9 MG/ML
25 INJECTION, SOLUTION INTRAVENOUS PRN
Status: CANCELLED | OUTPATIENT
Start: 2021-09-09

## 2021-09-09 RX ORDER — SODIUM CHLORIDE 0.9 % (FLUSH) 0.9 %
5-40 SYRINGE (ML) INJECTION PRN
Status: CANCELLED | OUTPATIENT
Start: 2021-09-09

## 2021-09-09 RX ORDER — PANTOPRAZOLE SODIUM 40 MG/10ML
40 INJECTION, POWDER, LYOPHILIZED, FOR SOLUTION INTRAVENOUS 2 TIMES DAILY
Status: DISCONTINUED | OUTPATIENT
Start: 2021-09-09 | End: 2021-09-10

## 2021-09-09 RX ORDER — LIDOCAINE HYDROCHLORIDE 20 MG/ML
INJECTION, SOLUTION INFILTRATION; PERINEURAL PRN
Status: DISCONTINUED | OUTPATIENT
Start: 2021-09-09 | End: 2021-09-09 | Stop reason: SDUPTHER

## 2021-09-09 RX ORDER — SUCRALFATE 1 G/1
1 TABLET ORAL
Status: DISCONTINUED | OUTPATIENT
Start: 2021-09-09 | End: 2021-09-11 | Stop reason: HOSPADM

## 2021-09-09 RX ORDER — PROPOFOL 10 MG/ML
INJECTION, EMULSION INTRAVENOUS PRN
Status: DISCONTINUED | OUTPATIENT
Start: 2021-09-09 | End: 2021-09-09 | Stop reason: SDUPTHER

## 2021-09-09 RX ADMIN — ROPINIROLE HYDROCHLORIDE 0.5 MG: 0.5 TABLET, FILM COATED ORAL at 20:09

## 2021-09-09 RX ADMIN — SODIUM CHLORIDE, PRESERVATIVE FREE 10 ML: 5 INJECTION INTRAVENOUS at 20:09

## 2021-09-09 RX ADMIN — PANTOPRAZOLE SODIUM 40 MG: 40 INJECTION, POWDER, FOR SOLUTION INTRAVENOUS at 08:48

## 2021-09-09 RX ADMIN — PROPOFOL 40 MG: 10 INJECTION, EMULSION INTRAVENOUS at 14:50

## 2021-09-09 RX ADMIN — SODIUM CHLORIDE: 9 INJECTION, SOLUTION INTRAVENOUS at 14:25

## 2021-09-09 RX ADMIN — HYDRALAZINE HYDROCHLORIDE 10 MG: 20 INJECTION INTRAMUSCULAR; INTRAVENOUS at 04:48

## 2021-09-09 RX ADMIN — PROPOFOL 40 MG: 10 INJECTION, EMULSION INTRAVENOUS at 15:01

## 2021-09-09 RX ADMIN — PROPOFOL 40 MG: 10 INJECTION, EMULSION INTRAVENOUS at 15:05

## 2021-09-09 RX ADMIN — SUCRALFATE 1 G: 1 TABLET ORAL at 20:09

## 2021-09-09 RX ADMIN — PROPOFOL 30 MG: 10 INJECTION, EMULSION INTRAVENOUS at 14:35

## 2021-09-09 RX ADMIN — PANTOPRAZOLE SODIUM 40 MG: 40 INJECTION, POWDER, FOR SOLUTION INTRAVENOUS at 20:09

## 2021-09-09 RX ADMIN — PROPOFOL 30 MG: 10 INJECTION, EMULSION INTRAVENOUS at 14:37

## 2021-09-09 RX ADMIN — PROPOFOL 40 MG: 10 INJECTION, EMULSION INTRAVENOUS at 14:42

## 2021-09-09 RX ADMIN — LIDOCAINE HYDROCHLORIDE 80 MG: 20 INJECTION, SOLUTION INFILTRATION; PERINEURAL at 14:35

## 2021-09-09 RX ADMIN — PROPOFOL 40 MG: 10 INJECTION, EMULSION INTRAVENOUS at 14:45

## 2021-09-09 RX ADMIN — HYDRALAZINE HYDROCHLORIDE 10 MG: 20 INJECTION INTRAMUSCULAR; INTRAVENOUS at 18:33

## 2021-09-09 RX ADMIN — PROPOFOL 30 MG: 10 INJECTION, EMULSION INTRAVENOUS at 14:47

## 2021-09-09 RX ADMIN — SODIUM CHLORIDE, PRESERVATIVE FREE 10 ML: 5 INJECTION INTRAVENOUS at 08:48

## 2021-09-09 RX ADMIN — HYDROMORPHONE HYDROCHLORIDE 0.25 MG: 1 INJECTION, SOLUTION INTRAMUSCULAR; INTRAVENOUS; SUBCUTANEOUS at 13:23

## 2021-09-09 RX ADMIN — PROPOFOL 40 MG: 10 INJECTION, EMULSION INTRAVENOUS at 14:55

## 2021-09-09 RX ADMIN — PROPOFOL 30 MG: 10 INJECTION, EMULSION INTRAVENOUS at 14:39

## 2021-09-09 RX ADMIN — PROPOFOL 40 MG: 10 INJECTION, EMULSION INTRAVENOUS at 14:58

## 2021-09-09 ASSESSMENT — PAIN SCALES - GENERAL
PAINLEVEL_OUTOF10: 0
PAINLEVEL_OUTOF10: 5
PAINLEVEL_OUTOF10: 0
PAINLEVEL_OUTOF10: 7
PAINLEVEL_OUTOF10: 0
PAINLEVEL_OUTOF10: 6
PAINLEVEL_OUTOF10: 4
PAINLEVEL_OUTOF10: 0

## 2021-09-09 ASSESSMENT — ENCOUNTER SYMPTOMS
SHORTNESS OF BREATH: 1
DYSPNEA ACTIVITY LEVEL: AFTER AMBULATING 1 FLIGHT OF STAIRS

## 2021-09-09 ASSESSMENT — PAIN - FUNCTIONAL ASSESSMENT: PAIN_FUNCTIONAL_ASSESSMENT: 0-10

## 2021-09-09 ASSESSMENT — PAIN DESCRIPTION - PAIN TYPE: TYPE: ACUTE PAIN

## 2021-09-09 NOTE — ED NOTES
ED to inpatient nurses report    Chief Complaint   Patient presents with    Other     food stuck in throat      Present to ED from home  LOC: alert and orientated to name, place, date  Vital signs   Vitals:    09/08/21 1539 09/08/21 1639 09/08/21 1739 09/08/21 2055   BP: (!) 169/86 (!) 175/96 (!) 163/83 (!) 157/95   Pulse: 72 68 71 80   Resp: 18 17 17 17   Temp:       TempSrc:       SpO2: 97% 96% 95% 97%   Weight:       Height:          Oxygen Baseline room air    Current needs required room air Bipap/Cpap No  LDAs:   Peripheral IV 09/08/21 Right Hand (Active)     Mobility: Requires assistance * 1  Pending ED orders: none  Present condition: pt resting on cot with no signs of distress    Electronically signed by Fransisco Cabezas, RN on 9/8/2021 at 9:23 PM       [address] K[address] Davenport RN  09/08/21 2274

## 2021-09-09 NOTE — OP NOTE
Operative Note      Patient: Jared Tai  YOB: 1950  MRN: [id number]    Date of Procedure: 9/9/2021    Pre-Op Diagnosis: DYSPHAGIA    Post-Op Diagnosis: Same:   Food in distal 5-6 cm of esophagus (removed by advancing the food particles into the stomach with the scope:  Required 25 minutes);  hiatal hernia (large); Gastritis. Distal esophagitis with inflamed mucosa,  Slight nodularity. Use Protonix IV BID;  Liquids PO. Will try to check esophogram and upper gi to define anatomy of eg junction and hiatal hernia. Will need to remain on PPI indeifinitely. Procedure(s):  EGD FOREIGN BODY REMOVAL    Surgeon(s):   Luz Alcala MD    Assistant:   * No surgical staff found *    Anesthesia: Monitor Anesthesia Care    Estimated Blood Loss (mL): Minimal    Complications: None    Specimens:   * No specimens in log *    Implants:  * No implants in log *      Drains: * No LDAs found *    Findings: above    Detailed Description of Procedure:   dictated    Electronically signed by Luz Alcala MD on 9/9/2021 at 3:13 PM

## 2021-09-09 NOTE — PROGRESS NOTES
Recovery mode. Denies discomfort, taking fluids. Dr. Jamarcus Easley discussed findings and plan of care with patient and . Discharge instructions provided, understanding verbalized.

## 2021-09-09 NOTE — BRIEF OP NOTE
Brief Postoperative Note      Patient: Rola Morales  YOB: 1950  MRN: [id number]    Date of Procedure: 9/9/2021    Pre-Op Diagnosis: DYSPHAGIA    Post-Op Diagnosis: Same       Procedure(s):  EGD FOREIGN BODY REMOVAL    Surgeon(s):   Bob Lieberman MD    Assistant:  * No surgical staff found *    Anesthesia: Monitor Anesthesia Care    Estimated Blood Loss (mL): Minimal    Complications: None    Specimens:   * No specimens in log *    Implants:  * No implants in log *      Drains: * No LDAs found *    Findings: above    Electronically signed by Bob Lieberman MD on 9/9/2021 at 3:13 PM

## 2021-09-09 NOTE — PROGRESS NOTES
Progress note      [facility]             Patient:  Gabriela Conde  YOB: 1950    MRN: [id number]   Acct:  [id number]   [id number]  Primary Care Physician: TIMOTHY Mcleod CNP    Admit Date: 9/8/2021           Subjective: Pt was having esophagram done when she had a syncopal episode and fell on her right leg. Pt was unconscious for 3-5 seconds and recovered spontaneously. Pt to have EGD today.         Objective:      Physical Exam:    Vitals:Patient Vitals for the past 24 hrs:   BP Temp Temp src Pulse Resp SpO2   09/09/21 1100 -- -- -- -- -- 96 %   09/09/21 0955 137/68 98 °F (36.7 °C) Oral 80 18 97 %   09/09/21 0845 (!) 142/73 98.2 °F (36.8 °C) Oral 100 20 96 %   09/09/21 0550 137/67 -- -- 78 -- --   09/09/21 0333 (!) 172/94 -- -- -- -- --   09/09/21 0331 (!) 180/83 98.2 °F (36.8 °C) Oral 86 18 96 %   09/08/21 2352 (!) 143/65 -- -- 85 -- --   09/08/21 2136 (!) 176/98 98 °F (36.7 °C) Oral 86 20 96 %   09/08/21 2055 (!) 157/95 -- -- 80 17 97 %   09/08/21 1739 (!) 163/83 -- -- 71 17 95 %   09/08/21 1639 (!) 175/96 -- -- 68 17 96 %   09/08/21 1539 (!) 169/86 -- -- 72 18 97 %   09/08/21 1435 (!) 163/78 -- -- 71 18 95 %   09/08/21 1335 (!) 162/89 -- -- 69 17 96 %     Weight: Weight: (!) 315 lb (142.9 kg)     24 hour intake/output:No intake or output data in the 24 hours ending 09/09/21 1245    General appearance - alert, well appearing, and in no distress  Eyes - pupils equal and reactive, extraocular eye movements intact  Ears - bilateral TM's and external ear canals normal  Nose - normal and patent, no erythema, discharge or polyps  Mouth - mucous membranes moist, pharynx normal without lesions  Neck - supple, no significant adenopathy  Lymphatics - no palpable lymphadenopathy, no hepatosplenomegaly  Chest - clear to auscultation, no wheezes, rales or rhonchi, symmetric air entry  Distant Breath Sounds: No  Heart - 1. Dysphagia with known hiatal hernia s/p nissen fundoplication 3180  a. GI following   b. NPO   c. Was supposed to have esophragram this AM but passed out in fluoro while standing doing the exam  d. EGD today  e. Gen surg to see  f. Holding alendronate as may contribute to dysphagia and esophagitis   g. Cont IV Protonix; was on PO Protonix in the past   2. Syncope leading to fall  1. Check BL Carotid ultrasound   2. Orthostatic vitals negative   3. Check XR right leg/knee/ankle   3. Abdominal pain  a. Resolved  4. Diabetes Mellitus   a. Holding oral meds while NPO  b. Insulin SS while inpt   5. HLD  a. Cont statin   6. PT/OT  7. DVT prophylaxis   a. Lovenox on hold due to EGD  b.  SCDs for now       Assessment and plan of care discussed with supervising physician, Dr Jenny Medrano.      Electronically signed by TIMOTHY Pascal CNP on 9/9/2021 at 12:45 PM    Pt seen and examined by me  Apparently passed out while attempting to drink the fizzy stuff prior to her esophagram  Still has dysphagia  D/w  at bedside  No dizziness cp or numbness or tingling prior to the episode  No significant pain   No bladder ow bowel incontinence  Has bruise on Rt leg  Xray pending  Electronically signed by Winsome Cee MD on 9/9/2021 at 1:43 PM

## 2021-09-09 NOTE — PROGRESS NOTES
POST FALL MANAGEMENT    Christian Mcintyre  MEDICAL RECORD NUMBER:  [id number]  AGE: 70 y.o. GENDER: female  : 1950  TODAYS DATE:  2021    Details     Fall Occurred: Yes    Was the Fall Witnessed:  Yes - was during esophogram      Brief Review of Event: Patient standing and being examined during esophogram. Patient swallowed teto seltzer salts and immediately fainted, collapsing on her R leg. Loss of consciousness was 3-5 seconds. Patient quickly alert and oriented x4. Who found the patient: [employer] employee      Where was the patient at the time of the fall: Fluoroscopy for esophogram      Patient Comments: Patient states this has never happened before. Stated she did not remember falling and woke up not knowing where she was. Alert and oriented other wise. Patient quickly realized she was in the hospital. Patient states her right leg hurts. Date Fall Occurred:  2021 . Time Fall Occurred: 9:35a.m. per phone report     Assessment     Post Fall Head to Toe Assessment Completed: Yes    Post Janeal Arrow and ABCDS Completed: Yes     Post Fall Vitals Completed: Yes    Post Fall Neuro Checks Completed: Yes    Injury Occurred(if yes, describe injury):  yes - fell on R shin and ankle. From Knee down is swollen, reddened and bruised. Did the Patient Experience:(Check Marylee Liter all that apply)    [] Patient hit head  [x] Loss of consciousness  [] Change in mental status following the fall  [] Patient is on an anticoagulant medication       CT Performed:  No. X-rays of RLE ordered.      Follow-up     Persons Notified of Fall:  (Provide names of persons notified)   [] Physician:  [x] AAWIS: Marzena Vogt   [x] Nursing Supervisior: Olivier Beatty  [x] Manager: Mateo Higginbotham  [x] Pharmacist: Mitesh Koroma  [x] Family: Thalia Luna,   [] Other:      Electronically signed by Dangelo Cronin RN 2021 at 10:01 AM

## 2021-09-09 NOTE — ANESTHESIA PRE PROCEDURE
Department of Anesthesiology  Preprocedure Note       Name:  Reinier Burnham   Age:  70 y.o.  :  1950                                          MRN:  [id number]         Date:  2021      Surgeon: Eun Deluna): Canelo Fox MD    Procedure: Procedure(s):  EGD ESOPHAGOGASTRODUODENOSCOPY, POSS DILATION    Medications prior to admission:   Prior to Admission medications    Medication Sig Start Date End Date Taking? Authorizing Provider   PREVAGEN 10 MG CAPS Take by mouth   Yes Historical Provider, MD   alendronate (FOSAMAX) 35 MG tablet Take 35 mg by mouth every 7 days   Yes Historical Provider, MD   Probiotic Product (PROBIOTIC DAILY PO) Take by mouth daily   Yes Historical Provider, MD   VITAMIN D PO Take by mouth daily   Yes Historical Provider, MD   metFORMIN (GLUCOPHAGE) 500 MG tablet Take 500 mg by mouth 2 times daily (with meals)    Yes Historical Provider, MD   DULoxetine (CYMBALTA) 30 MG extended release capsule Take 30 mg by mouth daily   Yes Historical Provider, MD   naproxen (NAPROSYN) 500 MG tablet Take 500 mg by mouth as needed for Pain   Yes Historical Provider, MD   atorvastatin (LIPITOR) 10 MG tablet Take 10 mg by mouth daily. Yes Historical Provider, MD   rOPINIRole (REQUIP) 0.5 MG tablet Take 0.5 mg by mouth nightly.    Yes Historical Provider, MD   atenolol (TENORMIN) 25 MG tablet take 1/2 tablet by mouth once daily 20   Purvi Bettencourt MD   melatonin 3 MG TABS tablet Take 1 tablet by mouth nightly as needed (insomnia) 19  Kobe العراقي MD   SUMAtriptan (IMITREX) 50 MG tablet Take 50 mg by mouth once as needed for Migraine    Historical Provider, MD       Current medications:    Current Facility-Administered Medications   Medication Dose Route Frequency Provider Last Rate Last Admin    HYDROmorphone (DILAUDID) injection 0.25 mg  0.25 mg IntraVENous Q4H PRN TIMOTHY Arroyo - CNP   0.25 mg at 21 1323    atenolol (TENORMIN) tablet 25 mg  25 mg Oral Daily Skyler Magallanes MD        atorvastatin (LIPITOR) tablet 10 mg  10 mg Oral Daily Skyler Magallanes MD        DULoxetine (CYMBALTA) extended release capsule 30 mg  30 mg Oral Daily Skyler Magallanes MD        [Held by provider] linagliptin (TRADJENTA) tablet 5 mg  5 mg Oral Daily Skyler Magallanes MD        [Held by provider] metFORMIN (GLUCOPHAGE) tablet 500 mg  500 mg Oral BID WC Skyler Magallanes MD        rOPINIRole (REQUIP) tablet 0.5 mg  0.5 mg Oral Nightly Skyler Magallanes MD        sodium chloride flush 0.9 % injection 5-40 mL  5-40 mL IntraVENous 2 times per day Amy Amaya APRN - CNP   10 mL at 09/09/21 0848    sodium chloride flush 0.9 % injection 5-40 mL  5-40 mL IntraVENous PRN Amy Amaya APRN - CNP        0.9 % sodium chloride infusion  25 mL IntraVENous PRN TIMOTHY Arrington - CNP        [Held by provider] enoxaparin (LOVENOX) injection 40 mg  40 mg SubCUTAneous Q24H Amy Amaya APRN - CNP   40 mg at 09/08/21 2242    promethazine (PHENERGAN) tablet 12.5 mg  12.5 mg Oral Q6H PRN Amy Amaya APRN - CNP        Or    ondansetron (ZOFRAN) injection 4 mg  4 mg IntraVENous Q6H PRN Amy Amaya, APRN - CNP        insulin lispro (HUMALOG) injection vial 0-6 Units  0-6 Units SubCUTAneous TID  TIMOTHY Monzon - CNP        insulin lispro (HUMALOG) injection vial 0-3 Units  0-3 Units SubCUTAneous Nightly Amy Amaya APRN - CNP        glucose (GLUTOSE) 40 % oral gel 15 g  15 g Oral PRN Amy Amaya, APRN - CNP        dextrose 50 % IV solution  12.5 g IntraVENous PRN Amy Amaya, APRN - CNP        glucagon (rDNA) injection 1 mg  1 mg IntraMUSCular PRN Amy Amaya, APRN - CNP        dextrose 5 % solution  100 mL/hr IntraVENous PRN Amy Amaya APRN - CNP        pantoprazole (PROTONIX) injection 40 mg  40 mg IntraVENous Daily Skyler Magallanes MD   40 mg at 09/09/21 0848    hydrALAZINE (APRESOLINE) injection 10 mg  10 mg IntraVENous Q6H PRN Alejandra Mason MD   10 mg at 09/09/21 0448       Allergies:  No Known Allergies    Problem List:    Patient Active Problem List   Diagnosis Code    Hyperglycemia R73.9    GERD (gastroesophageal reflux disease) K21.9    SCOTT (dyspnea on exertion) R06.00    Hyperlipidemia E78.5    Morbid obesity ([address] E66.01    RLS (restless legs syndrome) G25.81    Osteopenia M85.80    Hiatal hernia K44.9    Dysphagia R13.10       Past Medical History:        Diagnosis Date    Abdominal hernia     Cancer ([address]     skin    SCOTT (dyspnea on exertion)     false positive stress test 2014 with normal cardiac cath 2014.  GERD (gastroesophageal reflux disease)     Headache     Migraines    Hyperglycemia 2015    borderline takes Metformin    Hyperlipidemia     Migraine headache     Morbid obesity (HCC)     Osteopenia     RLS (restless legs syndrome)     Type 1 diabetes mellitus ([address]     UTI (urinary tract infection)     history of       Past Surgical History:        Procedure Laterality Date    BUNIONECTOMY Right 1980's    CARPAL TUNNEL RELEASE Bilateral 1990 2008 12 2016     x2 right x2 left    [facility]  [facility]    GASTRIC FUNDOPLICATION N/A 28/5/4844    ROBOTIC HIATAL HERNIA WITH NISSEN FUNDOPLICATION performed [facility] Ricci [facility] Left 02/2015     [facility]    JOINT REPLACEMENT Right     KNEE ARTHROPLASTY Left 2007    KNEE ARTHROPLASTY Right 2014    UPPER GASTROINTESTINAL ENDOSCOPY      UPPER GASTROINTESTINAL ENDOSCOPY N/A 11/13/2018    EGD DIAGNOSTIC ONLY performed by Trang Christy MD at [facility]   [place] Bilateral 2019       Social History:    Social History     Tobacco Use    Smoking status: Never Smoker    Smokeless tobacco: Never Used   Substance Use Topics    Alcohol use:  No                                Counseling given: Not Answered      Vital Signs COVID19        Anesthesia Evaluation  Patient summary reviewed  Airway: Mallampati: II  TM distance: >3 FB   Neck ROM: full  Mouth opening: > = 3 FB Dental:    (+) upper dentures and lower dentures      Pulmonary:normal exam    (+) shortness of breath: chronic,                             Cardiovascular:Negative CV ROS  Exercise tolerance: poor (<4 METS),   (+) SCOTT: after ambulating 1 flight of stairs, hyperlipidemia      ECG reviewed        Stress test reviewed       Beta Blocker:  Dose within 24 Hrs         Neuro/Psych:   (+) headaches: migraine headaches, depression/anxiety              ROS comment: Syncopal episode today prior to barium swallow study. GI/Hepatic/Renal:   (+) hiatal hernia, GERD: well controlled, morbid obesity          Endo/Other:    (+) DiabetesType II DM, well controlled, , : arthritis: OA., malignancy/cancer. Abdominal:   (+) obese,           Vascular: negative vascular ROS. Other Findings:             Anesthesia Plan      MAC     ASA 3       Induction: intravenous. Anesthetic plan and risks discussed with patient. Use of blood products discussed with patient whom consented to blood products. Plan discussed with attending.                   Paula Beckham, TIMOTHY - CRNA   9/9/2021

## 2021-09-09 NOTE — FLOWSHEET NOTE
09/09/21 1007   Provider Notification   Reason for Communication New orders; Review case   Provider Name Virlinda Boast   Provider Notification Advance Practice Clinician (CNS/NP/CNM/CRNA/PA)   Method of Communication Secure Message   Response Waiting for response   Notification Time [phone]     Asked for scan of RLE. Notified patient is requesting something for pain and is unable to swallow pills.

## 2021-09-09 NOTE — CARE COORDINATION
9/9/21, 11:17 AM EDT  DISCHARGE PLANNING EVALUATION:    Galen Saini       Admitted: 9/8/2021/ [facility] day: 0   Location: -17/017-A Reason for admit: Hiatal hernia [K44.9]  Dysphagia [R13.10]  Dysphagia, unspecified type [R13.10]   PMH:  has a past medical history of Abdominal hernia, Cancer ([facility] [address] SCOTT (dyspnea on exertion), GERD (gastroesophageal reflux disease), Headache, Hyperglycemia, Hyperlipidemia, Migraine headache, Morbid obesity ([address] Osteopenia, RLS (restless legs syndrome), Type 1 diabetes mellitus ([facility] [address] and UTI (urinary tract infection). Procedure:   9/8 CT abdomen/pelvis:   No evidence of acute intra-abdominal or intrapelvic abnormality. 2. Moderate hiatal hernia. 3. Hepatic steatosis. 4. Cholelithiasis. 9/9 Esophagram planned  Barriers to Discharge:  Surgery, GI consulted. Reports passing out, new XR orders to check for injury. Orthostatic checks. IV protonix. PRN nausea control. PT/OT. PCP: TIMOTHY Godinez - CNP   %    Patient Goals/Plan/Treatment Preferences: Spoke with Francisca Dias, she plans to return home with . She is independent and does not anticipate discharge needs. CM to follow-up closer to discharge. Transportation/Food Security/Housekeeping Addressed:  No issues identified.

## 2021-09-09 NOTE — PROGRESS NOTES
Photos taken, scope used GIF SER#585. No biopsies taken, foreign body removal - esophagus had food lodged in her entire esophagus, egd completed, pt tolerated well.

## 2021-09-09 NOTE — ANESTHESIA POSTPROCEDURE EVALUATION
Department of Anesthesiology  Postprocedure Note    Patient: Daniel Coffman  MRN: [id number]  YOB: 1950  Date of evaluation: 9/9/2021  Time:  3:12 PM     Procedure Summary     Date: 09/09/21 Room / Location: [facility] / [address] [place] [address]    Anesthesia Start: 0796 Anesthesia Stop: 9915    Procedure: EGD FOREIGN BODY REMOVAL (N/A ) Diagnosis: (DYSPHAGIA)    Surgeons: Dangelo Viveros MD Responsible Provider: Brenda Curtis DO    Anesthesia Type: MAC ASA Status: 3          Anesthesia Type: MAC    Marciano Phase I: Marciano Score: 10    Marciano Phase II:      Last vitals: Reviewed and per EMR flowsheets.        Anesthesia Post Evaluation    Patient location during evaluation: bedside  Patient participation: complete - patient participated  Level of consciousness: awake and alert  Pain score: 0  Airway patency: patent  Nausea & Vomiting: no nausea and no vomiting  Complications: no  Cardiovascular status: hemodynamically stable  Respiratory status: spontaneous ventilation and acceptable  Hydration status: stable

## 2021-09-09 NOTE — PROGRESS NOTES
Post-fall pharmacy consult    Pharmacy has been consulted to review medication profile for fall risk.   Medications increasing risk of falling: none  Medications increasing risk of bleeding: Lovenox 40 mg 9/8/2021  Findings discussed with nurse, Margot Hampton   Recommendations: none at this time     Denise BolañosD 9/9/2021 2:59 PM

## 2021-09-09 NOTE — PROGRESS NOTES
[address] THERAPY MISSED TREATMENT NOTE  [facility] END[facility]  [facility] POOL RM/NONE      Date: 2021  Patient Name: Codi Cutler        CSN: [id number]   : 1950  (75 y.o.)  Gender: female                REASON FOR MISSED TREATMENT: Pt off floor, in endoscopy.  Will check back as able

## 2021-09-10 ENCOUNTER — APPOINTMENT (OUTPATIENT)
Dept: GENERAL RADIOLOGY | Age: 71
End: 2021-09-10
Payer: MEDICARE

## 2021-09-10 LAB
ANION GAP SERPL CALCULATED.3IONS-SCNC: 12 MEQ/L (ref 8–16)
BASOPHILS # BLD: 0.4 %
BASOPHILS ABSOLUTE: 0 THOU/MM3 (ref 0–0.1)
BUN BLDV-MCNC: 15 MG/DL (ref 7–22)
CALCIUM SERPL-MCNC: 9 MG/DL (ref 8.5–10.5)
CHLORIDE BLD-SCNC: 104 MEQ/L (ref 98–111)
CO2: 22 MEQ/L (ref 23–33)
CREAT SERPL-MCNC: 0.7 MG/DL (ref 0.4–1.2)
EKG ATRIAL RATE: 94 BPM
EKG P AXIS: 45 DEGREES
EKG P-R INTERVAL: 138 MS
EKG Q-T INTERVAL: 432 MS
EKG QRS DURATION: 142 MS
EKG QTC CALCULATION (BAZETT): 540 MS
EKG R AXIS: -32 DEGREES
EKG T AXIS: 26 DEGREES
EKG VENTRICULAR RATE: 94 BPM
EOSINOPHIL # BLD: 1.7 %
EOSINOPHILS ABSOLUTE: 0.1 THOU/MM3 (ref 0–0.4)
ERYTHROCYTE [DISTWIDTH] IN BLOOD BY AUTOMATED COUNT: 13.8 % (ref 11.5–14.5)
ERYTHROCYTE [DISTWIDTH] IN BLOOD BY AUTOMATED COUNT: 47 FL (ref 35–45)
GFR SERPL CREATININE-BSD FRML MDRD: 82 ML/MIN/1.73M2
GLUCOSE BLD-MCNC: 106 MG/DL (ref 70–108)
GLUCOSE BLD-MCNC: 115 MG/DL (ref 70–108)
GLUCOSE BLD-MCNC: 122 MG/DL (ref 70–108)
GLUCOSE BLD-MCNC: 134 MG/DL (ref 70–108)
HCT VFR BLD CALC: 39.6 % (ref 37–47)
HEMOGLOBIN: 12.9 GM/DL (ref 12–16)
IMMATURE GRANS (ABS): 0.02 THOU/MM3 (ref 0–0.07)
IMMATURE GRANULOCYTES: 0.3 %
LYMPHOCYTES # BLD: 24.3 %
LYMPHOCYTES ABSOLUTE: 1.7 THOU/MM3 (ref 1–4.8)
MCH RBC QN AUTO: 29.9 PG (ref 26–33)
MCHC RBC AUTO-ENTMCNC: 32.6 GM/DL (ref 32.2–35.5)
MCV RBC AUTO: 91.9 FL (ref 81–99)
MONOCYTES # BLD: 9.5 %
MONOCYTES ABSOLUTE: 0.7 THOU/MM3 (ref 0.4–1.3)
NUCLEATED RED BLOOD CELLS: 0 /100 WBC
PLATELET # BLD: 186 THOU/MM3 (ref 130–400)
PMV BLD AUTO: 9.7 FL (ref 9.4–12.4)
POTASSIUM SERPL-SCNC: 4.2 MEQ/L (ref 3.5–5.2)
RBC # BLD: 4.31 MILL/MM3 (ref 4.2–5.4)
SEG NEUTROPHILS: 63.8 %
SEGMENTED NEUTROPHILS ABSOLUTE COUNT: 4.4 THOU/MM3 (ref 1.8–7.7)
SODIUM BLD-SCNC: 138 MEQ/L (ref 135–145)
WBC # BLD: 6.9 THOU/MM3 (ref 4.8–10.8)

## 2021-09-10 PROCEDURE — C9113 INJ PANTOPRAZOLE SODIUM, VIA: HCPCS | Performed by: INTERNAL MEDICINE

## 2021-09-10 PROCEDURE — 6370000000 HC RX 637 (ALT 250 FOR IP): Performed by: INTERNAL MEDICINE

## 2021-09-10 PROCEDURE — G0378 HOSPITAL OBSERVATION PER HR: HCPCS

## 2021-09-10 PROCEDURE — 2580000003 HC RX 258: Performed by: INTERNAL MEDICINE

## 2021-09-10 PROCEDURE — 82948 REAGENT STRIP/BLOOD GLUCOSE: CPT

## 2021-09-10 PROCEDURE — 97535 SELF CARE MNGMENT TRAINING: CPT

## 2021-09-10 PROCEDURE — 6360000002 HC RX W HCPCS: Performed by: INTERNAL MEDICINE

## 2021-09-10 PROCEDURE — 80048 BASIC METABOLIC PNL TOTAL CA: CPT

## 2021-09-10 PROCEDURE — 96376 TX/PRO/DX INJ SAME DRUG ADON: CPT

## 2021-09-10 PROCEDURE — 85025 COMPLETE CBC W/AUTO DIFF WBC: CPT

## 2021-09-10 PROCEDURE — 6370000000 HC RX 637 (ALT 250 FOR IP): Performed by: REGISTERED NURSE

## 2021-09-10 PROCEDURE — 99203 OFFICE O/P NEW LOW 30 MIN: CPT | Performed by: SURGERY

## 2021-09-10 PROCEDURE — 36415 COLL VENOUS BLD VENIPUNCTURE: CPT

## 2021-09-10 RX ORDER — ACETAMINOPHEN 325 MG/1
650 TABLET ORAL EVERY 6 HOURS PRN
Status: DISCONTINUED | OUTPATIENT
Start: 2021-09-10 | End: 2021-09-11 | Stop reason: HOSPADM

## 2021-09-10 RX ORDER — PANTOPRAZOLE SODIUM 40 MG/1
40 TABLET, DELAYED RELEASE ORAL
Status: DISCONTINUED | OUTPATIENT
Start: 2021-09-10 | End: 2021-09-11 | Stop reason: HOSPADM

## 2021-09-10 RX ADMIN — SODIUM CHLORIDE, PRESERVATIVE FREE 10 ML: 5 INJECTION INTRAVENOUS at 09:28

## 2021-09-10 RX ADMIN — SODIUM CHLORIDE, PRESERVATIVE FREE 10 ML: 5 INJECTION INTRAVENOUS at 20:38

## 2021-09-10 RX ADMIN — SUCRALFATE 1 G: 1 TABLET ORAL at 18:36

## 2021-09-10 RX ADMIN — PANTOPRAZOLE SODIUM 40 MG: 40 INJECTION, POWDER, FOR SOLUTION INTRAVENOUS at 09:28

## 2021-09-10 RX ADMIN — SUCRALFATE 1 G: 1 TABLET ORAL at 05:48

## 2021-09-10 RX ADMIN — ATORVASTATIN CALCIUM 10 MG: 10 TABLET, FILM COATED ORAL at 09:27

## 2021-09-10 RX ADMIN — PANTOPRAZOLE SODIUM 40 MG: 40 TABLET, DELAYED RELEASE ORAL at 18:35

## 2021-09-10 RX ADMIN — ACETAMINOPHEN 650 MG: 325 TABLET ORAL at 18:38

## 2021-09-10 RX ADMIN — SUCRALFATE 1 G: 1 TABLET ORAL at 20:38

## 2021-09-10 RX ADMIN — ROPINIROLE HYDROCHLORIDE 0.5 MG: 0.5 TABLET, FILM COATED ORAL at 20:38

## 2021-09-10 RX ADMIN — DULOXETINE HYDROCHLORIDE 30 MG: 30 CAPSULE, DELAYED RELEASE ORAL at 09:27

## 2021-09-10 RX ADMIN — ATENOLOL 25 MG: 25 TABLET ORAL at 09:27

## 2021-09-10 ASSESSMENT — PAIN DESCRIPTION - PAIN TYPE: TYPE: ACUTE PAIN

## 2021-09-10 ASSESSMENT — PAIN DESCRIPTION - LOCATION: LOCATION: ANKLE

## 2021-09-10 ASSESSMENT — PAIN SCALES - GENERAL
PAINLEVEL_OUTOF10: 0
PAINLEVEL_OUTOF10: 3
PAINLEVEL_OUTOF10: 0

## 2021-09-10 ASSESSMENT — PAIN DESCRIPTION - ORIENTATION: ORIENTATION: RIGHT

## 2021-09-10 ASSESSMENT — PAIN DESCRIPTION - DESCRIPTORS: DESCRIPTORS: SORE;DULL

## 2021-09-10 NOTE — OP NOTE
[address] [place] [address]                                OPERATIVE REPORT    PATIENT NAME: Shannon Quiroga                     :        1950  MED REC NO:   [id number]                           ROOM:       0017  ACCOUNT NO:   [de-identified]                           ADMIT DATE: 2021  PROVIDER:     Brooke Matt M.D.    Lian Feng:  2021    PROCEDURE:  Upper endoscopy. INDICATION:  Dysphagia. SURGEON:  Brooke Matt MD    ANESTHESIA:  IV Diprivan per Anesthesia. DESCRIPTION OF PROCEDURE:  Prior to procedure, risks, benefits,  complications (including but not limited to the following; bleeding,  infection, perforation, emergency surgery, stroke, heart attack, death,  possible anesthetic risks, and the fact that the procedure is not 100%  accurate or successful), indications, and alternatives of upper  endoscopy were explained to the patient. She understood and agreed to  the procedure. All questions were answered. With the patient in left lateral decubitus position, GIF-180  forward-viewing videoscope introduced without difficulty. Endoscope was  advanced to the esophagus. In the mid distal esophagus, I encountered  some food. In the distal 5-6 cm of esophagus, there were multiple  pieces of food somewhat impacted in the distal esophagus. I was able to  maneuver the scope through the distal esophagus, which is at 34 cm. Hiatal hernia was noted, mildly large. Gastritis was noted. The  endoscope was advanced along the greater curvature of the stomach. In  the antrum, gastritis was noted. Endoscope was advanced to the pylorus, first and second portions of the  duodenum, no abnormalities noted. The endoscope was then retraced back  to stomach and retroflexed, somewhat difficult to evaluate the hernia  size. Endoscope was straightened and retraced back to the esophagus.   I  then spent the next 20-25 minutes removing the food material from the  esophagus. This required multiple passes of the scope to remove  essentially all the food material from the distal esophagus and advance  to the stomach. There was distal esophagitis and inflammation in the  distal esophagus plus some nodularity. Endoscope was then retraced back  to the esophagus, small amount of material near the trachea _____  endoscope was removed. Procedure terminated. The patient tolerated the  procedure well, taken to GI lab recovery area in stable condition. IMPRESSION:  1. Impacted food in the distal esophagus removed as described. 2.  Distal esophagitis and nodularity. 3.  Large hiatal hernia. 4.  Moderate gastritis, pictures are pending. PLAN:  1. Liquids only. 2.  Increase the Protonix to b.i.d. and add Carafate. 3.  Estimated blood loss was less than 10 mL. 4.  We will try to get an esophagram and upper GI to define the GE  junction and the hernia. It is possible that she has a paraesophageal  hernia causing some degree of obstruction at the GE junction. She was  not obstructed in the lumen of the esophagus. We used Protonix b.i.d.  and Carafate q.i.d. as a liquid. She will need to remain on PPI  indefinitely. Dr. Daniel Hernandez can consider repeating EGD at a later date and  determine if it is appropriate to do dilation. JUAN A Carlos M.D.    D: 09/09/2021 15:27:03       T: 09/09/2021 22:24:30     [email or web address]  Job#: [id number]     Doc#: [id number]    CC:  Michael Hunter M.D.

## 2021-09-10 NOTE — FLOWSHEET NOTE
09/10/21 1256   Provider Notification   Reason for Communication Review case   Provider Name The Office Staff   Provider Notification Other (Comment)  (Surgeon )   Method of Communication Secure Message   Response Waiting for response   Notification Time [phone]     Dr. Márquez Monday was consulted for surgery, however she is out right now. Asked if whoever is covering for her can come see patient.

## 2021-09-10 NOTE — CONSULTS
[facility]  General Surgery Consult Note  Domi Mcdaniel MD FACS    Pt Name: Laure Grayson  MRN: [id number]  YOB: 1950  Date of evaluation: 9/10/2021  Primary Care Physician: TIMOTHY Monge - CNP  Patient evaluated at the request of [facility]    Reason for evaluation: Recurrent hiatal hernia with obstruction  IMPRESSIONS:   1. Recurrent hiatal hernia with obstruction  2. Dysphagia  3. Morbid obesity (BMI 47)  4. Syncope  5. Diabetes mellitus  6. does not have any pertinent problems on file. PLANS:   1. Agree patient is in need of esophagram/upper GI  2. Reviewed EGD with patient. Reviewed CT imaging with patient as well. Patient with recurrent at least moderate sized hiatal hernia. Most likely cause of previous obstruction. No acute surgical intervention needed at this time. Seems to be tolerating liquids well. Okay to advance to soft diet after upper GI tomorrow if patient continues to do well. 3. Outpatient follow-up with patient primary surgeon Dr. Gilbert Ham for further discussions about recurrent hiatal hernia repair. 4. PPI  5. Further work-up for syncopal episode per admitting  6. Abdomen benign  7. Await upper GI results tomorrow  SUBJECTIVE:   Chief complaint: Dysphagia    History of present illness: 57-year-old female who was asked to see in consultation secondary to dysphagia. Patient found to have at least a moderate sized recurrent hiatal hernia with food impaction. Impaction alleviated by upper endoscopy while in hospital.  Patient underwent robotic repair esophageal hiatal hernia with mesh and Nissen fundoplication December 5, 2018. Not too long after that she did fall on her chest in [place]. Overall had done well since then except having some heartburn/reflux. She felt that there was some coconut/fruit stuck in her throat as she was not able to get anything else down since eating that. Had a lot of nausea but no real emesis.   No diarrhea or constipation. No significant chest pain or shortness of breath. Some mild upper abdominal pain but seemed to get worse in the left upper quadrant just prior to coming to the emergency department. She states now she feels fine. Tolerating full liquids. No abdominal or chest pain. No significant heartburn or reflux. Planning upper GI tomorrow morning. Past Medical History:      Diagnosis Date    Abdominal hernia     Cancer ([address]     skin    SCOTT (dyspnea on exertion)     false positive stress test 2014 with normal cardiac cath 2014.  GERD (gastroesophageal reflux disease)     Headache     Migraines    Hyperglycemia 2015    borderline takes Metformin    Hyperlipidemia     Migraine headache     Morbid obesity (HCC)     Osteopenia     RLS (restless legs syndrome)     Type 1 diabetes mellitus ([address]     UTI (urinary tract infection)     history of     Past Surgical History:      Procedure Laterality Date    BUNIONECTOMY Right 1980's    CARPAL TUNNEL RELEASE Bilateral 1990 2008 [place]ia Flash 2016     x2 right x2 left    [facility]  [facility]    GASTRIC FUNDOPLICATION N/A 39/2/6111    ROBOTIC HIATAL HERNIA WITH NISSEN FUNDOPLICATION performed by [facility]in Adam [facility] Left 02/2015     [facility]    JOINT REPLACEMENT Right     KNEE ARTHROPLASTY Left 2007    KNEE ARTHROPLASTY Right 2014    UPPER GASTROINTESTINAL ENDOSCOPY      UPPER GASTROINTESTINAL ENDOSCOPY N/A 11/13/2018    EGD DIAGNOSTIC ONLY performed by Bryanna Guerrero MD at [facility]   [address] N/A 9/9/2021    EGD FOREIGN BODY REMOVAL performed by Jewel Castillo MD at [facility] Bilateral 2019     Medications:  Prior to Admission medications    Medication Sig Start Date End Date Taking?  Authorizing Provider   PREVAGEN 10 MG CAPS Take by mouth   Yes Historical Provider, MD   alendronate (FOSAMAX) 35 MG history includes Arthritis in her maternal grandfather, maternal grandmother, and mother; Breast Cancer (age of onset: 68) in her maternal grandmother; Cancer in her maternal grandfather and mother; High Blood Pressure in her mother; Obesity in her mother; Ovarian Cancer (age of onset: 48) in her sister. Social History:   reports that she has never smoked. She has never used smokeless tobacco. She reports that she does not drink alcohol and does not use drugs. Review of Systems:  General Denies any fever or chills. No significant unexpected weight change. HEENT Denies any diplopia, tinnitus or vertigo. No chronic headaches. Resp Denies any shortness of breath, cough or wheezing  Cardiac Denies any chest pain, palpitations, claudication or edema  GI Denies any melena, hematochezia, hematemesis or pyrosis. Nausea and left upper quadrant abdominal pain as mentioned above. GERD.  Denies any frequency, urgency, hesitancy or incontinence  Heme Denies bruising or bleeding easily  Endocrine diabetes mellitus. No thyroid disease. Neuro Denies any focal motor or sensory deficits  Musculoskeletal  Denies osteoarthritis. No gout. No weakness. Psychiatric  Denies any severe depression or agitation. No panic attacks. No suicidal ideation. OBJECTIVE:   CURRENT VITALS:  height is 5' 8\" (1.727 m) and weight is 315 lb (142.9 kg) (abnormal). Her oral temperature is 98.1 °F (36.7 °C). Her blood pressure is 146/72 (abnormal) and her pulse is 84. Her respiration is 18 and oxygen saturation is 94%.    Temperature Range (24h):Temp: 98.1 °F (36.7 °C) Temp  Av.9 °F (36.6 °C)  Min: 97 °F (36.1 °C)  Max: 98.6 °F (37 °C)  BP Range (96X): Systolic (70TBC), PKO:926 , Min:115 , YS     Diastolic (69MYN), TGP:72, Min:59, Max:94    Pulse Range (24h): Pulse  Av.6  Min: 80  Max: 107  Respiration Range (24h): Resp  Av.5  Min: 12  Max: 20  Current Pulse Ox (24h):  SpO2: 94 %  Pulse Ox Range (24h):  SpO2  Av.1 % Min: 94 %  Max: 99 %  Oxygen Amount and Delivery:    CONSTITUTIONAL: Alert and oriented times 3, no acute distress and cooperative to examination with proper mood and affect. SKIN: Skin color, texture, turgor normal. No rashes or lesions. LYMPH: no cervical nodes, no inguinal nodes  HEENT: Head is normocephalic, atraumatic. EOMI, PERRLA. NECK: Supple, symmetrical, trachea midline, no adenopathy, thyroid symmetric, not enlarged and no tenderness, skin normal.  CHEST/LUNGS: chest symmetric with normal A/P diameter, normal respiratory rate and rhythm, lungs clear to auscultation without wheezes, rales or rhonchi. No accessory muscle use. Scars None   CARDIOVASCULAR: Heart sounds are normal.  Regular rate and rhythm. Normal S1 and S2.  ABDOMEN: Normal shape. Obese. Normal bowel sounds. Soft, nondistended, no masses or organomegaly. no evidence of incarcerated/strangulated hernia. Percussion: Normal without hepatosplenomegally. Tenderness: absent. RECTAL: deferred, not clinically indicated  NEUROLOGIC: There are no focalizing motor or sensory deficits. CN II-XII are grossly intact. EXTREMITIES: no cyanosis, no clubbing and no edema. LABS:     Recent Labs     09/08/21  1259 09/09/21  0531 09/10/21  0547   WBC 5.2 6.7 6.9   HGB 13.9 13.5 12.9   HCT 42.1 40.4 39.6    190 186    140 138   K 4.0 3.6 4.2    104 104   CO2 26 25 22*   BUN 10 14 15   CREATININE 0.7 0.6 0.7   CALCIUM 9.6 9.1 9.0     RADIOLOGY:   I have personally reviewed the following films:    Narrative   PROCEDURE: CT CHEST WO CONTRAST       CLINICAL INFORMATION: dsyphagia.       COMPARISON: CT chest dated 5/28/2014.       TECHNIQUE: 5 mm noncontrast axial images were obtained through the chest. Sagittal and coronal reconstructions were created.       All CT scans at this facility use dose modulation, iterative reconstruction, and/or weight-based dosing when appropriate to reduce radiation dose to as low as reasonably achievable.     FINDINGS:   There is a stable calcified nodule in the left upper lobe and left hilar and mediastinal calcifications is evidence for old granulomatous disease. A 2 mm nodule in the lingular region of the left lung is also stable compared to prior exam. There is a    linear opacity at the left lung base, stable compared to prior exam as evidence for recurrent atelectasis or scar. The lungs otherwise appear clear. No axillary, mediastinal or hilar lymphadenopathy is identified. There is a trace pericardial effusion,    stable compared to prior exam. The unopacified heart is unremarkable. There is a moderate-sized hiatal hernia, stable compared to prior exam. There is fluid within the esophagus extending to the thoracic inlet level.       The liver is diffusely hypodense without focal lesion in the visualized portion. There are small hyperdense stones in the visualized portion of the gallbladder. There are a few small scattered calcified granulomas in the visualized portion of the spleen.    The pancreas is partially fatty replaced. There are stable degenerative changes of the thoracic spine.           Impression       1. No evidence of acute intrathoracic abnormality. 2. Stable moderate hiatal hernia with gastroesophageal reflux to the level of the thoracic inlet. 3. Hepatic steatosis. 4. Cholelithiasis.                 **This report has been created using voice recognition software. It may contain minor errors which are inherent in voice recognition technology. **       Final report electronically signed by Dr. William Duong MD on 9/8/2021 1:15 PM     Narrative   PROCEDURE: CT ABDOMEN PELVIS W IV CONTRAST       CLINICAL INFORMATION: abdominal pain .       COMPARISON: CT abdomen pelvis dated 8/2/2017.       TECHNIQUE: Axial 5 mm CT images were obtained through the abdomen and pelvis after the administration of 80 mL Isovue-370 injected in the right hand with sagittal and coronal reconstructions.     All CT scans at this facility use dose modulation, iterative reconstruction, and/or weight-based dosing when appropriate to reduce radiation dose to as low as reasonably achievable.       FINDINGS:   Jacobo Nichole are stable small calcified nodules in the left lung with left hilar calcifications is evidence for old granulomatous disease. There are minimal posterior dependent changes. Visualized portions of the lungs are otherwise clear. The visualized    portion of the heart is unremarkable. There is a small to moderate-sized hiatal hernia, decreased in size compared to prior exam.       The liver is diffusely hypodense without focal lesion identified. There is a small hyperdense stone in the neck of the gallbladder. Adrenal glands and kidneys are unremarkable. There are scattered calcified granulomas in the spleen, stable compared to    prior exam. The pancreas is partially fatty replaced, stable compared to prior exam. No retroperitoneal or mesenteric lymphadenopathy is identified.       The large and small bowel appear within normal limits. The appendix is normal in appearance. The unopacified bladder is unremarkable. The uterus appears to be surgically absent. No free fluid is identified. Redemonstration of bilateral hip arthroplasties    with associated streak artifact partially obscuring the pelvis. There are stable degenerative changes of the lumbar spine.           Impression       1. No evidence of acute intra-abdominal or intrapelvic abnormality. 2. Moderate hiatal hernia. 3. Hepatic steatosis. 4. Cholelithiasis.                 **This report has been created using voice recognition software. It may contain minor errors which are inherent in voice recognition technology. **       Final report electronically signed by Dr. Dmitri Mendoza MD on 2021 3:57 PM     OPERATIVE REPORT     PATIENT NAME: Janna Kinney                     :        1950  MED REC NO:   [id number] ROOM:       0017  ACCOUNT NO:   [de-identified]                           ADMIT DATE: 09/08/2021  PROVIDER:     Regla Jama. Eri Keating M.D.     DATE OF PROCEDURE:  09/09/2021     PROCEDURE:  Upper endoscopy.     INDICATION:  Dysphagia.     SURGEON:  Regla Jama. Eri Keating MD     ANESTHESIA:  IV Diprivan per Anesthesia.     DESCRIPTION OF PROCEDURE:  Prior to procedure, risks, benefits,  complications (including but not limited to the following; bleeding,  infection, perforation, emergency surgery, stroke, heart attack, death,  possible anesthetic risks, and the fact that the procedure is not 100%  accurate or successful), indications, and alternatives of upper  endoscopy were explained to the patient. She understood and agreed to  the procedure. All questions were answered.     With the patient in left lateral decubitus position, GIF-180  forward-viewing videoscope introduced without difficulty. Endoscope was  advanced to the esophagus. In the mid distal esophagus, I encountered  some food. In the distal 5-6 cm of esophagus, there were multiple  pieces of food somewhat impacted in the distal esophagus. I was able to  maneuver the scope through the distal esophagus, which is at 34 cm. Hiatal hernia was noted, mildly large. Gastritis was noted. The  endoscope was advanced along the greater curvature of the stomach. In  the antrum, gastritis was noted.     Endoscope was advanced to the pylorus, first and second portions of the  duodenum, no abnormalities noted. The endoscope was then retraced back  to stomach and retroflexed, somewhat difficult to evaluate the hernia  size. Endoscope was straightened and retraced back to the esophagus. I  then spent the next 20-25 minutes removing the food material from the  esophagus. This required multiple passes of the scope to remove  essentially all the food material from the distal esophagus and advance  to the stomach.   There was distal esophagitis and inflammation in the  distal esophagus plus some nodularity. Endoscope was then retraced back  to the esophagus, small amount of material near the trachea _____  endoscope was removed. Procedure terminated. The patient tolerated the  procedure well, taken to GI lab recovery area in stable condition.     IMPRESSION:  1. Impacted food in the distal esophagus removed as described. 2.  Distal esophagitis and nodularity. 3.  Large hiatal hernia. 4.  Moderate gastritis, pictures are pending.     PLAN:  1. Liquids only. 2.  Increase the Protonix to b.i.d. and add Carafate. 3.  Estimated blood loss was less than 10 mL. 4.  We will try to get an esophagram and upper GI to define the GE  junction and the hernia. It is possible that she has a paraesophageal  hernia causing some degree of obstruction at the GE junction. She was  not obstructed in the lumen of the esophagus. We used Protonix b.i.d.  and Carafate q.i.d. as a liquid. She will need to remain on PPI  indefinitely.  _____ can consider repeating EGD at a later date and  determine if it is appropriate to do dilation.        JUAN A Ingram M.D. Thank you for the interesting evaluation. Further recommendations to follow.     Electronically signed by Mo Hoyos MD on 9/10/2021 at 2:46 PM

## 2021-09-10 NOTE — PROGRESS NOTES
Progress note      [facility]             Patient:  Klaudia Colin  YOB: 1950    MRN: [id number]   Acct:  [id number]   [id number]  Primary Care Physician: TIMOTHY Murillo CNP    Admit Date: 9/8/2021           Subjective: Pt had EGD done yesterday with food bolus impaction removed. Feeling much better and tolerating full liquid diet.          Objective:      Physical Exam:    Vitals:  Patient Vitals for the past 24 hrs:   BP Temp Temp src Pulse Resp SpO2   09/10/21 0915 (!) 146/72 98.1 °F (36.7 °C) Oral 84 18 94 %   09/10/21 0430 (!) 144/59 98.6 °F (37 °C) Oral 92 18 97 %   09/09/21 1930 (!) 140/67 98.3 °F (36.8 °C) Oral 90 18 96 %   09/09/21 1815 (!) 168/75 -- -- 95 18 96 %   09/09/21 1745 (!) 177/81 -- -- 93 16 94 %   09/09/21 1730 (!) 178/86 -- -- 107 18 95 %   09/09/21 1715 (!) 174/94 -- -- 80 20 98 %   09/09/21 1700 135/88 97.5 °F (36.4 °C) Oral 88 18 98 %   09/09/21 1523 135/69 -- -- 82 16 96 %   09/09/21 1517 138/68 -- -- 85 16 95 %   09/09/21 1356 (!) 162/81 -- -- 75 16 97 %     Weight: Weight: (!) 315 lb (142.9 kg)     24 hour intake/output:    Intake/Output Summary (Last 24 hours) at 9/10/2021 1330  Last data filed at 9/10/2021 1050  Gross per 24 hour   Intake 1845.6 ml   Output 700 ml   Net 1145.6 ml       General appearance - alert, well appearing, and in no distress  Eyes - pupils equal and reactive, extraocular eye movements intact  Ears - bilateral TM's and external ear canals normal  Nose - normal and patent, no erythema, discharge or polyps  Mouth - mucous membranes moist, pharynx normal without lesions  Neck - supple, no significant adenopathy  Lymphatics - no palpable lymphadenopathy, no hepatosplenomegaly  Chest - clear to auscultation, no wheezes, rales or rhonchi, symmetric air entry  Distant Breath Sounds: No  Heart - normal rate, regular rhythm, normal S1, S2, no murmurs, rubs, clicks or gallops  Abdomen - soft, non distended, non-tender  Morbidly obese: yes; Neurological - alert, oriented, normal speech, no focal findings or movement disorder noted  Musculoskeletal - no joint tenderness, deformity or swelling  Extremities - peripheral pulses normal, right shin hematoma painful to palpation  Skin - normal coloration and turgor, no rashes, no suspicious skin lesions noted    Review of Labs and Diagnostic Testing:    CBC:   Recent Labs     09/10/21  0547   WBC 6.9   HGB 12.9   HCT 39.6   MCV 91.9        BMP:   Recent Labs     09/10/21  0547      K 4.2      CO2 22*   BUN 15   CREATININE 0.7   CALCIUM 9.0   GLUCOSE 122*     PT/INR: No results for input(s): PROTIME, INR in the last 72 hours. APTT: No results for input(s): APTT in the last 72 hours. Lipids: No results for input(s): ALKPHOS, ALT, AST, BILITOT, BILIDIR, LABALBU, AMYLASE, LIPASE in the last 72 hours. Troponin:   Recent Labs     09/08/21  1259   TROPONINT < 0.010        Imaging:  [unfilled]    EKG:      Diet: ADULT DIET; Full Liquid; Low Fat (less than or equal to 50 gm/day)  Diet NPO Exceptions are: Sips of Water with Meds        Data:   Scheduled Meds: Scheduled Meds:   pantoprazole  40 mg IntraVENous BID    sucralfate  1 g Oral 4x Daily AC & HS    atenolol  25 mg Oral Daily    atorvastatin  10 mg Oral Daily    DULoxetine  30 mg Oral Daily    [Held by provider] linagliptin  5 mg Oral Daily    [Held by provider] metFORMIN  500 mg Oral BID WC    rOPINIRole  0.5 mg Oral Nightly    sodium chloride flush  5-40 mL IntraVENous 2 times per day    [Held by provider] enoxaparin  40 mg SubCUTAneous Q24H    insulin lispro  0-6 Units SubCUTAneous TID WC    insulin lispro  0-3 Units SubCUTAneous Nightly     Continuous Infusions:   sodium chloride      dextrose       PRN Meds:. HYDROmorphone, sodium chloride flush, sodium chloride, promethazine **OR** ondansetron, glucose, dextrose, glucagon (rDNA), dextrose, hydrALAZINE  Continuous Infusions:   sodium chloride      dextrose           Assessment/plan   1. Dysphagia with known hiatal hernia s/p nissen fundoplication 4991  a. GI following   b. Full liquid diet now  c. Had EGD yesterday with food bolus removed   d. UGI and esophagram to be done tomorrow AM prior to any surgical intervention to be discussed  e. Gen surg to see the pt    f. Holding alendronate as may contribute to dysphagia and esophagitis   g. Switch IV protonix to PO  2. Syncope leading to fall  1. BL carotid ultrasound negative   2. Orthostatic vitals negative   3. XR right leg/ankle/knee negative   3. Abdominal pain  a. Resolved  4. Diabetes Mellitus   a. Holding oral meds while NPO  b. Insulin SS while inpt   5. HLD  a. Cont statin   6. PT/OT  7. DVT prophylaxis   a. Lovenox on hold due to EGD  b.  SCDs for now       Assessment and plan of care discussed with supervising physician, Dr Anthony Silva.      Electronically signed by TIMOTHY Hammond CNP on 9/10/2021 at 1:30 PM    Pt seen and examined by me  D/w Porter Fan  EGD noted  Pt now agrees for her esophagram to be done tomorrow  Cont full liquid diet per GI    Electronically signed by Arrie Sever, MD on 9/10/2021 at 3:14 PM

## 2021-09-10 NOTE — PROGRESS NOTES
Razia Stevenson 60  PHYSICAL THERAPY MISSED TREATMENT NOTE  [facility] ONC MED 5K    Date: 9/10/2021  Patient Name: Kim Danielle        MRN: [id number]   : 1950  (75 y.o.)  Gender: female                REASON FOR MISSED TREATMENT:  Per OT communication and evaluation, pt is moving with Mod I to Ind. No PT necessary, will defer. Leora Dakin.  Brad Denney, [place] [address]

## 2021-09-10 NOTE — PROGRESS NOTES
Patient able to swallow pills with water without difficulty. Larger pills cut in half. Patient tolerating full liquid diet.

## 2021-09-10 NOTE — PROGRESS NOTES
Gastroenterology Progress Note:     Patient Name:  Betaa Arriaga   MRN: [id number]  [id number]  YOB: 1950  Admit Date: 9/8/2021 12:27 PM  Primary Care Physician: TIMOTHY Teixeira - CNP   5K-17/017-A     Patient seen and examined. 24 hours events and chart reviewed. Subjective: Patient sitting up on the couch. She went down for UGI & esophagram this morning, but states \"the radiologist, told her GI saw everything they would need to see when they did the EGD. \" The patient agreed to not proceed & radiology cancelled the orders. EGD results discussed. Lengthy discussion had regarding the importance of the imaging for potential surgical intervention. Objective:  BP (!) 146/72   Pulse 84   Temp 98.1 °F (36.7 °C) (Oral)   Resp 18   Ht 5' 8\" (1.727 m)   Wt (!) 315 lb (142.9 kg)   SpO2 94%   BMI 47.90 kg/m²     Physical Exam:    General:  Nourished in no distress  HEENT: Atraumatic, normocephalic. Moist oral mucous membranes. Neck: Supple without adenopathy, JVD, thyromegaly or masses. Trachea midline. CV: Heart RRR, no murmurs, rubs, gallops. Resp: Even, easy without cough or accessory use. Lungs clear to ascultation bilaterally. Abd: Round, soft, obese, nontender. No hepatosplenomegaly or mass present. Active bowel sounds heard. No distention noted. Ext:  Without cyanosis, clubbing, edema. Skin: Pink, warm, dry  Neuro:  Alert, oriented x 3 with no obvious deficits.        Rectal: deferred    Labs:   CBC:   Lab Results   Component Value Date    WBC 6.9 09/10/2021    HGB 12.9 09/10/2021    HCT 39.6 09/10/2021    MCV 91.9 09/10/2021     09/10/2021     BMP:   Lab Results   Component Value Date     09/10/2021    K 4.2 09/10/2021    K 3.6 09/09/2021     09/10/2021    CO2 22 09/10/2021    BUN 15 09/10/2021    CREATININE 0.7 09/10/2021    CALCIUM 9.0 09/10/2021     Lipids:   Lab Results   Component Value Date    ALKPHOS 88 11/11/2020    ALT 9 11/11/2020    AST 11 11/11/2020    BILITOT 0.9 11/11/2020    BILIDIR <0.2 06/04/2019    LABALBU 4.2 11/11/2020     Significant Diagnostic Studies:   EGD 09/09/21 by Dr. Evelyn Serrato: food in distal 5-6 cm of esophagus which was removed by advancing the food particles into the stomach with the scope (required a significant amount of time), large hiatal hernia, gastritis, distal esophagitis with inflamed mucosa, slight nodularity    Current Meds:  Scheduled Meds:   pantoprazole  40 mg IntraVENous BID    sucralfate  1 g Oral 4x Daily AC & HS    atenolol  25 mg Oral Daily    atorvastatin  10 mg Oral Daily    DULoxetine  30 mg Oral Daily    [Held by provider] linagliptin  5 mg Oral Daily    [Held by provider] metFORMIN  500 mg Oral BID WC    rOPINIRole  0.5 mg Oral Nightly    sodium chloride flush  5-40 mL IntraVENous 2 times per day    [Held by provider] enoxaparin  40 mg SubCUTAneous Q24H    insulin lispro  0-6 Units SubCUTAneous TID WC    insulin lispro  0-3 Units SubCUTAneous Nightly     Continuous Infusions:   sodium chloride      dextrose         Assessment:  69 yo F admitted 09/08/21 for dysphagia. Esophagram attempted 09/09/21 & patient \"passed out\" so it was aborted. H/O Nissen fundoplication 5725, by Dr. Lamberto Velazquez. CT A/P demonstrated moderate hiatal hernia, hepatic steatosis, and cholelithiasis. EGD 09/09/21 demonstrated food in distal 5-6 cm of esophagus which was removed by advancing the food particles into the stomach with the scope (required a significant amount of time), large hiatal hernia, gastritis, distal esophagitis with inflamed mucosa, slight nodularity. 1. Dysphagia- s/p EGD 09/09/21  2. Large hiatal hernia noted on imagine- s/p Nissen fundoplication [id number]  3. Syncopal episode with fall during esophagram  4. DM  5. Chronic left-sided abdominal pain ongoing for 2 years- s/p full GI work-up   6. DM  7. GERD- on PPI  8. HLD  9. RLS  10.  Migraines with chronic NSAID use    Plan:     Continue PPI, home dose   Clear liquid diet after imaging   Lengthy discussion had regarding the importance of the imaging for potential surgical intervention. She is agreeable to proceed.    UGI & esophagram   General surgery consulted, await recs, likely will need surgical intervention given EGD results & symptoms   Supportive care per primary team    Case reviewed and impression/plan reviewed in collaboration with Dr. Jamarcus Easley  Electronically signed by TIMOTHY Malik CNP on 9/10/2021 at 12:52 PM    [facility]

## 2021-09-10 NOTE — CARE COORDINATION
9/10/21, 3:03 PM EDT    DISCHARGE ON GOING EVALUATION    [address] day: 0  Location: 5-17/017-A Reason for admit: Hiatal hernia [K44.9]  Dysphagia [R13.10]  Dysphagia, unspecified type [R13.10]   Procedure:   9/09/2021 EGD   IMPRESSION:  1. Impacted food in the distal esophagus removed as described. 2.  Distal esophagitis and nodularity. 3.  Large hiatal hernia. 4.  Moderate gastritis, pictures are pending. 9/10/2021 Planned esophagram and UGI  Barriers to Discharge: GI following, General Surgery consulted, PT/OT complete (no follow-up needed), Lovenox, Tradjenta, and Glucophage on hold, ISS, prn pain medications and antiemetics, full liquid diet, SCD's, up with assistance. PCP: TIMOTHY Royal - CNP   %  Patient Goals/Plan/Treatment Preferences: José Antonio Shelton is from home with her . She plans for the same at discharge.

## 2021-09-10 NOTE — PROGRESS NOTES
Received phone call from fluoroscopy reporting patient refused procedure. Patient spoke with radiologist and decided she did not want to complete it. Patient returned to room at this time and stated she did not want have have procedure done if it was not necessary. RN will update GI upon rounding.

## 2021-09-10 NOTE — PROGRESS NOTES
[place] VISION:Corrected    HEARING:  WFL    COGNITION: WFL    RANGE OF MOTION:  Bilateral Upper Extremity:  WFL    STRENGTH:  Bilateral Upper Extremity:  WFL    ADL:   Feeding: Independent. Grooming: Independent. washing/drying hands while standing at sink  Lower Extremity Dressing: Independent. adjusting/pulling up socks while seated in bed  Toileting: Independent.  hygiene, clothing mgmt  Toilet Transfer: Modified Independent. Erleen Alfonso to/from standard toilet    BALANCE:  Sitting Balance:  Independent. Standing Balance: Independent. Dynamic reach outside ISAEL without LOB    BED MOBILITY:  Supine to Sit: Modified Independent    Sit to Supine: Modified Independent    Scooting: Independent      TRANSFERS:  Sit to Stand:  Independent. Stand to Sit: Independent. FUNCTIONAL MOBILITY:  Assistive Device: None  Assist Level:  Modified Independent. Distance: to/from bathroom and additional short distance in room  Steady, slightly favors RLE due to soreness at ankle. During 2nd round of mobility, pt reported \"feeling really hot\" and declined mobility out in hallway. RN informed. Encouraged pt to continue R ankle ROM to prevent stiffness, reinforced ice/elevation. Activity Tolerance:  Patient tolerance of  treatment: good. Pt reported at her baseline, declines any further need for therapy at this time, and declined any concerns with returning home when medically stable. Assessment:  Assessment: Pt currently at baseline for ADLs, mobility, and transfers and safe to return home when medically stable. No further OT services warranted at this time. Prognosis: Good  REQUIRES OT FOLLOW UP: No  No Skilled OT: Independent with functional mobility, Independent with ADL's, At baseline function, Safe to return home, No OT goals identified  Decision Making: Low Complexity    Treatment Initiated: Treatment and education initiated within context of evaluation. Evaluation time included review of current medical information, gathering information related to past medical, social and functional history, completion of standardized testing, formal and informal observation of tasks, assessment of data and development of plan of care and goals. Treatment time included skilled education and facilitation of tasks to increase safety and independence with ADL's for improved functional independence and quality of life. Discharge Recommendations:  Home with assist PRN, Defer OT at this time    Patient Education:  OT Education: OT Role (increasing activity as able; continued walking in room/murphy as able; slowly returning to daily routine at home; encouraged to take breaks/stops every few hours during road trip (if/when MD clears to complete trip))    Equipment Recommendations:  Equipment Needed: No    Plan:  Times per week: N/A. See long-term goal time frame for expected duration of plan of care. If no long-term goals established, a short length of stay is anticipated. Goals:     Short term goals  Time Frame for Short term goals: N/A         Following session, patient left in safe position with all fall risk precautions in place.

## 2021-09-11 ENCOUNTER — APPOINTMENT (OUTPATIENT)
Dept: GENERAL RADIOLOGY | Age: 71
End: 2021-09-11
Payer: MEDICARE

## 2021-09-11 VITALS
TEMPERATURE: 98 F | SYSTOLIC BLOOD PRESSURE: 126 MMHG | WEIGHT: 293 LBS | BODY MASS INDEX: 44.41 KG/M2 | DIASTOLIC BLOOD PRESSURE: 81 MMHG | HEIGHT: 68 IN | OXYGEN SATURATION: 96 % | RESPIRATION RATE: 16 BRPM | HEART RATE: 71 BPM

## 2021-09-11 LAB
GLUCOSE BLD-MCNC: 115 MG/DL (ref 70–108)
GLUCOSE BLD-MCNC: 157 MG/DL (ref 70–108)

## 2021-09-11 PROCEDURE — 6370000000 HC RX 637 (ALT 250 FOR IP): Performed by: INTERNAL MEDICINE

## 2021-09-11 PROCEDURE — G0378 HOSPITAL OBSERVATION PER HR: HCPCS

## 2021-09-11 PROCEDURE — 6370000000 HC RX 637 (ALT 250 FOR IP): Performed by: REGISTERED NURSE

## 2021-09-11 PROCEDURE — 74240 X-RAY XM UPR GI TRC 1CNTRST: CPT

## 2021-09-11 PROCEDURE — 6360000004 HC RX CONTRAST MEDICATION: Performed by: INTERNAL MEDICINE

## 2021-09-11 PROCEDURE — 99224 PR SBSQ OBSERVATION CARE/DAY 15 MINUTES: CPT | Performed by: SURGERY

## 2021-09-11 PROCEDURE — 82948 REAGENT STRIP/BLOOD GLUCOSE: CPT

## 2021-09-11 PROCEDURE — 2580000003 HC RX 258: Performed by: INTERNAL MEDICINE

## 2021-09-11 PROCEDURE — A4641 RADIOPHARM DX AGENT NOC: HCPCS | Performed by: INTERNAL MEDICINE

## 2021-09-11 PROCEDURE — 74220 X-RAY XM ESOPHAGUS 1CNTRST: CPT

## 2021-09-11 RX ORDER — PANTOPRAZOLE SODIUM 40 MG/1
40 TABLET, DELAYED RELEASE ORAL
Qty: 60 TABLET | Refills: 0 | Status: SHIPPED | OUTPATIENT
Start: 2021-09-11

## 2021-09-11 RX ORDER — SUCRALFATE 1 G/1
1 TABLET ORAL
Qty: 120 TABLET | Refills: 0 | Status: SHIPPED | OUTPATIENT
Start: 2021-09-11

## 2021-09-11 RX ADMIN — ANTACID/ANTIFLATULENT 1 EACH: 380; 550; 10; 10 GRANULE, EFFERVESCENT ORAL at 08:42

## 2021-09-11 RX ADMIN — SUCRALFATE 1 G: 1 TABLET ORAL at 09:25

## 2021-09-11 RX ADMIN — SODIUM CHLORIDE, PRESERVATIVE FREE 10 ML: 5 INJECTION INTRAVENOUS at 09:26

## 2021-09-11 RX ADMIN — ATORVASTATIN CALCIUM 10 MG: 10 TABLET, FILM COATED ORAL at 12:50

## 2021-09-11 RX ADMIN — BARIUM SULFATE 70 ML: 0.6 SUSPENSION ORAL at 08:41

## 2021-09-11 RX ADMIN — ACETAMINOPHEN 650 MG: 325 TABLET ORAL at 09:25

## 2021-09-11 RX ADMIN — ATENOLOL 25 MG: 25 TABLET ORAL at 09:25

## 2021-09-11 RX ADMIN — DULOXETINE HYDROCHLORIDE 30 MG: 30 CAPSULE, DELAYED RELEASE ORAL at 09:25

## 2021-09-11 RX ADMIN — PANTOPRAZOLE SODIUM 40 MG: 40 TABLET, DELAYED RELEASE ORAL at 05:02

## 2021-09-11 ASSESSMENT — PAIN SCALES - GENERAL
PAINLEVEL_OUTOF10: 0
PAINLEVEL_OUTOF10: 1
PAINLEVEL_OUTOF10: 3

## 2021-09-11 NOTE — PROGRESS NOTES
Patient discharged at this time. All IV's removed. Discharge instructions, medication changes and follow up appointments explained at this time. All questions answered at this time. AVS given to patient and paperwork signed with this RN. All patient belongings returned. Chart broken down and placed in yellow bin. Patient educated to continue to ice right ankle and shin for comfort measures and keep it elevated at night. Educated to call Dr. Arnulfo Gillespie on Monday to make appointment to discuss hernia repair. GI office will call her for appointment. Patient taken to [facility] by transport and picked up by .

## 2021-09-11 NOTE — PROGRESS NOTES
IM Progress Note  Dr. Annalisa Bhat  9/11/2021 9:48 AM      Patient name Jenifer Hanna  DOB1950  PCP: Minus Rubinstein, APRN - CNP  Admit Date: 9/8/2021  Acct No. [de-identified]    Subjective: Interval History:   Pt back from radiology    Diet: ADULT DIET; Easy to Chew    I/O last 3 completed shifts: In: 360 [P.O.:360]  Out: 600 [Urine:600]  No intake/output data recorded. Admission weight: (!) 315 lb (142.9 kg) as of 9/8/2021 12:27 PM  Wt Readings from Last 3 Encounters:   09/08/21 (!) 315 lb (142.9 kg)   09/01/21 (!) 318 lb (144.2 kg)   09/14/20 (!) 310 lb (140.6 kg)     Body mass index is 47.9 kg/m². ROS   CVS;  no cp or palpitation  Resp: no SOB or cough  Neuro:  No numbness or weakness or dizziness  Abd: no nausea or vomiting or abd pain      Medications:   Scheduled Meds:   pantoprazole  40 mg Oral BID AC    sucralfate  1 g Oral 4x Daily AC & HS    atenolol  25 mg Oral Daily    atorvastatin  10 mg Oral Daily    DULoxetine  30 mg Oral Daily    [Held by provider] linagliptin  5 mg Oral Daily    [Held by provider] metFORMIN  500 mg Oral BID WC    rOPINIRole  0.5 mg Oral Nightly    sodium chloride flush  5-40 mL IntraVENous 2 times per day    [Held by provider] enoxaparin  40 mg SubCUTAneous Q24H    insulin lispro  0-6 Units SubCUTAneous TID WC    insulin lispro  0-3 Units SubCUTAneous Nightly     Continuous Infusions:   sodium chloride      dextrose         Labs :     CBC:   Recent Labs     09/08/21  1259 09/09/21  0531 09/10/21  0547   WBC 5.2 6.7 6.9   HGB 13.9 13.5 12.9    190 186     BMP:    Recent Labs     09/08/21  1259 09/09/21  0531 09/10/21  0547    140 138   K 4.0 3.6 4.2    104 104   CO2 26 25 22*   BUN 10 14 15   CREATININE 0.7 0.6 0.7   GLUCOSE 148* 120* 122*     Hepatic: No results for input(s): AST, ALT, ALB, BILITOT, ALKPHOS in the last 72 hours. Troponin: No results for input(s): TROPONINI in the last 72 hours.   BNP: No results for input(s): BNP in the last 72 hours. Lipids: No results for input(s): CHOL, HDL in the last 72 hours. Invalid input(s): LDLCALCU  INR: No results for input(s): INR in the last 72 hours.     Radiology    Objective:   Vitals: /81   Pulse 71   Temp 98 °F (36.7 °C) (Oral)   Resp 16   Ht 5' 8\" (1.727 m)   Wt (!) 315 lb (142.9 kg)   SpO2 96%   BMI 47.90 kg/m²   HEENT: Head:pupils react  Neck: supple  Lungs: clear to auscultation  Heart: regular rate and rhythm   Abdomen: soft BS heard NG NT  Extremities: warm bruise Rt leg   Neurologic:  Alert, oriented X3    Impression:   :   Dysphagia s/p EGD with food impaction  Syncope   abd pain   DM2  Hyperlipidemia      Plan:    If she tolerates diet and ok with consultants discharge home    Arrie Sever, MD, MD

## 2021-09-11 NOTE — PROGRESS NOTES
Florentin Wniter MD  Daily Progress Note    Pt Name: [address] [place] [address] Record Number: [id number]  Date of Birth 1950   Today's Date: 9/11/2021    Hospital day # 4    ASSESSMENT   Recurrent hiatal hernia with obstruction  Dysphagia  Morbid obesity (BMI 47)  Syncope  Diabetes mellitus   has a past medical history of Abdominal hernia, Cancer ([address] SCOTT (dyspnea on exertion), GERD (gastroesophageal reflux disease), Headache, Hyperglycemia, Hyperlipidemia, Migraine headache, Morbid obesity ([address] Osteopenia, RLS (restless legs syndrome), Type 1 diabetes mellitus ([address] and UTI (urinary tract infection). PLAN   Upper GI this morning  Okay to advance diet to soft diet after upper GI from a surgical standpoint  Abdomen benign. Continue serial abdominal exams as needed  PPI  If patient does well with upper GI and soft diet then okay for discharge from a surgical standpoint. Most likely plan on follow-up with primary surgeon Dr. Claudio Tyler as outpatient for further discussions about the pros and cons of observation versus surgical repair of recurrent hiatal hernia repair  SUBJECTIVE   Chief complaint: None    Stable overnight. Chart reviewed. Afebrile. Vital signs stable. Denies nausea or vomiting. Denies abdominal pain or tenderness. No bloating/distention. No chest pain or shortness of breath. Did well with liquids yesterday evening for dinner. N.p.o. at this time and planning for esophagram/upper GI this morning.   CURRENT MEDICATIONS   Scheduled Meds:   pantoprazole  40 mg Oral BID AC    sucralfate  1 g Oral 4x Daily AC & HS    atenolol  25 mg Oral Daily    atorvastatin  10 mg Oral Daily    DULoxetine  30 mg Oral Daily    [Held by provider] linagliptin  5 mg Oral Daily    [Held by provider] metFORMIN  500 mg Oral BID WC    rOPINIRole  0.5 mg Oral Nightly    sodium chloride flush  5-40 mL IntraVENous 2 times per day    [Held by provider] enoxaparin  40 mg SubCUTAneous Q24H    insulin lispro  0-6 Units SubCUTAneous TID WC    insulin lispro  0-3 Units SubCUTAneous Nightly     Continuous Infusions:   sodium chloride      dextrose       PRN Meds:.acetaminophen, HYDROmorphone, sodium chloride flush, sodium chloride, promethazine **OR** ondansetron, glucose, dextrose, glucagon (rDNA), dextrose, hydrALAZINE  OBJECTIVE   CURRENT VITALS:  height is 5' 8\" (1.727 m) and weight is 315 lb (142.9 kg) (abnormal). Her oral temperature is 97.8 °F (36.6 °C). Her blood pressure is 124/65 and her pulse is 76. Her respiration is 18 and oxygen saturation is 95%. Temperature Range (24h):Temp: 97.8 °F (36.6 °C) Temp  Av °F (36.7 °C)  Min: 97.6 °F (36.4 °C)  Max: 98.4 °F (36.9 °C)  BP Range (84F): Systolic (69SGQ), LBF:345 , Min:124 , RSS:417     Diastolic (70QGN), FUT:84, Min:60, Max:77    Pulse Range (24h): Pulse  Av.8  Min: 71  Max: 84  Respiration Range (24h): Resp  Av  Min: 18  Max: 18  Current Pulse Ox (24h):  SpO2: 95 %  Pulse Ox Range (24h):  SpO2  Av %  Min: 94 %  Max: 96 %  Oxygen Amount and Delivery:    Incentive Spirometry Tx:            GENERAL: alert, cooperative, no distress  SKIN: Skin color, texture, turgor normal. No rashes or lesions. HEENT: Head is normocephalic, atraumatic. EOMI, PERRLA. NECK: Supple, symmetrical, trachea midline, no adenopathy, thyroid symmetric, not enlarged and no tenderness, skin normal.  LUNGS: clear to ausculation, without wheezes, rales or rhonci  HEART: normal rate and regular rhythm  ABDOMEN: soft, non-tender, non-distended, bowel sounds present in all 4 quadrants and no guarding or peritoneal signs  NEUROLOGIC: There are no focalizing motor or sensory deficits. CN II-XII are grossly intact. EXTREMITIES: no cyanosis, no clubbing and no edema.     In: -   Out: [address]     21  1259 21  0531 09/10/21  0547   WBC 5.2 6.7 6.9   HGB 13.9 13.5 12.9   HCT 42.1 40.4 39.6    190 186    140 138   K 4.0 3.6 4.2    104 104   CO2 26 25 22*   BUN 10 14 15   CREATININE 0.7 0.6 0.7   CALCIUM 9.6 9.1 9.0      No results for input(s): PTT, INR in the last 72 hours. Invalid input(s): PT  No results for input(s): AST, ALT, BILITOT, BILIDIR, AMYLASE, LIPASE, LDH, LACTA in the last 72 hours.   Recent Labs     09/08/21  1259   [address] [place] [address] Sw < 0.010       RADIOLOGY   UGI Pending    Electronically signed by Adry Winter MD on 9/11/2021 at 7:15 AM

## 2021-09-12 NOTE — DISCHARGE SUMMARY
186 09/10/2021     BMP:    Lab Results   Component Value Date    GLUCOSE 122 09/10/2021     09/10/2021    K 4.2 09/10/2021    K 3.6 09/09/2021     09/10/2021    CO2 22 09/10/2021    ANIONGAP 12.0 09/10/2021    BUN 15 09/10/2021    CREATININE 0.7 09/10/2021    CALCIUM 9.0 09/10/2021    LABGLOM 82 09/10/2021     HFP:    Lab Results   Component Value Date    PROT 7.1 11/11/2020     CMP:    Lab Results   Component Value Date    GLUCOSE 122 09/10/2021     09/10/2021    K 4.2 09/10/2021    K 3.6 09/09/2021     09/10/2021    CO2 22 09/10/2021    BUN 15 09/10/2021    CREATININE 0.7 09/10/2021    ANIONGAP 12.0 09/10/2021    ALKPHOS 88 11/11/2020    ALT 9 11/11/2020    AST 11 11/11/2020    BILITOT 0.9 11/11/2020    LABALBU 4.2 11/11/2020    LABGLOM 82 09/10/2021    PROT 7.1 11/11/2020    CALCIUM 9.0 09/10/2021     PT/INR:  No results found for: PTINR, PROTIME, INR  PTT: No results found for: APTT  FLP:    Lab Results   Component Value Date    CHOL 188 11/11/2020    TRIG 149 11/11/2020    HDL 49 11/11/2020     U/A:    Lab Results   Component Value Date    COLORU YELLOW 11/11/2020    SPECGRAV 1.014 11/11/2020    PHUR 7.0 11/11/2020    PROTEINU NEGATIVE 11/11/2020    KETUA NEGATIVE 11/11/2020    BILIRUBINUR NEGATIVE 11/11/2020    UROBILINOGEN 1.0 11/11/2020    NITRU NEGATIVE 11/11/2020    LEUKOCYTESUR MODERATE 11/11/2020     TSH:    Lab Results   Component Value Date    TSH 1.120 06/03/2021       Radiology Last 7 Days:  XR KNEE RIGHT (3 VIEWS)    Result Date: 9/9/2021  No fracture or dislocation. Final report electronically signed by Dr. Beverley Stock on 9/9/2021 3:20 PM    XR TIBIA FIBULA RIGHT (2 VIEWS)    Result Date: 9/9/2021  No fracture or dislocation. Final report electronically signed by Dr. Beverley Stock on 9/9/2021 1:53 PM    XR ANKLE RIGHT (MIN 3 VIEWS)    Result Date: 9/9/2021  No fracture or dislocation.  Final report electronically signed by Dr. Beverley Stock on 9/9/2021 1:53 PM    CT recognition software. It may contain minor errors which are inherent in voice recognition technology. ** Final report electronically signed by Dr. Berta Hudson on 9/11/2021 9:04 AM    XR HIP 2-3 VW W PELVIS RIGHT    Result Date: 9/9/2021   Bilateral hip orthoplasty is without evidence of acute osseous abnormality. However, the distal most aspect of the femoral component is excluded from the radiographs. **This report has been created using voice recognition software. It may contain minor errors which are inherent in voice recognition technology. ** Final report electronically signed by Dr. Dana Mosqueda MD on 9/9/2021 1:32 PM      Discharge Plan   Disposition: Home    Provider Follow-Up:   TIMOTHY Teixeira CNP  [address] [address]  [phone]    On 9/16/2021  FOLLOW UP APPT @ 10:30AM, arrive 15 minutes early, please bring photo ID and medications    TIMOTHY Damian CNP  [address] [facility]      FOLLOW UP APPT @     TIMOTHY Teixeira CNP  [address] [address]  [phone]    In 3 days      Gabby Burks MD  [address] [facility] [address] [facility] [address] [place] [address]  [phone]    Schedule an appointment as soon as possible for a visit in 2 weeks  discuss hernia repair           Patient Instructions   Diet: diabetic diet    Activity: activity as tolerated        Discharge Medications         Medication List      START taking these medications    pantoprazole 40 MG tablet  Commonly known as: PROTONIX  Take 1 tablet by mouth 2 times daily (before meals)     sucralfate 1 GM tablet  Commonly known as: CARAFATE  Take 1 tablet by mouth 4 times daily (before meals and nightly)        CONTINUE taking these medications    atenolol 25 MG tablet  Commonly known as: TENORMIN  take 1/2 tablet by mouth once daily     atorvastatin 10 MG tablet  Commonly known as: LIPITOR     DULoxetine 30 MG extended release capsule  Commonly known as: CYMBALTA     melatonin 3 MG Tabs tablet  Take 1 tablet by mouth nightly as needed (insomnia)     metFORMIN 500 MG tablet  Commonly known as: GLUCOPHAGE  Take 1 tablet by mouth 2 times daily (with meals)  Start taking on: September 13, 2021     PROBIOTIC DAILY PO     rOPINIRole 0.5 MG tablet  Commonly known as: REQUIP     SUMAtriptan 50 MG tablet  Commonly known as: IMITREX     VITAMIN D PO        STOP taking these medications    alendronate 35 MG tablet  Commonly known as: FOSAMAX     naproxen 500 MG tablet  Commonly known as: NAPROSYN     Prevagen 10 MG Caps  Generic drug: Apoaequorin           Where to Get Your Medications      These medications were sent to [facility], [address]    Phone: [phone]   · pantoprazole 40 MG tablet  · sucralfate 1 GM tablet     Information about where to get these medications is not yet available    Ask your nurse or doctor about these medications  · metFORMIN 500 MG tablet         Electronically signed by Barbara Gresham MD on 9/12/21 at 8:09 AM EDT

## 2021-09-13 ENCOUNTER — CARE COORDINATION (OUTPATIENT)
Dept: CASE MANAGEMENT | Age: 71
End: 2021-09-13

## 2021-09-13 DIAGNOSIS — R13.10 DYSPHAGIA, UNSPECIFIED TYPE: Primary | ICD-10-CM

## 2021-09-13 DIAGNOSIS — K44.9 HIATAL HERNIA: ICD-10-CM

## 2021-09-13 PROCEDURE — 1111F DSCHRG MED/CURRENT MED MERGE: CPT | Performed by: NURSE PRACTITIONER

## 2021-09-13 NOTE — CARE COORDINATION
[facility] Transitions Initial Follow Up Call    Call within 2 business days of discharge: Yes    Patient: Maximiliano Muñoz Patient : 1950   MRN: [id number]  Reason for Admission: Dysphagia, unspecified type  Discharge Date: 21 RARS: No data recorded    Last Discharge [facility]       Complaint Diagnosis Description Type Department Provider    21 Other Dysphagia, unspecified type . .. ED to Hosp-Admission (Discharged) (ADMITTED) Anastacio Read MD; Marian Casanova. .. Spoke with Regla Camacho, said she is doing ok. Had a syncopal episode in the hospital and fell, her right leg is a little painful, bruised but nothing broken. Had food in her esophagus which was removed and feels much better. Is out eating lunch with friends. Aware of f/u appts. GI office to call for f/u. No other questions or concerns at this time. Will continue to follow.     Non-face-to-face services provided:  Scheduled appointment with PCP-  Scheduled appointment with Specialist-  Obtained and reviewed discharge summary and/or continuity of care documents    Care Transitions 24 Hour Call    Schedule Follow Up Appointment with PCP: Completed  Do you have any ongoing symptoms?: No  Do you have a copy of your discharge instructions?: Yes  Do you have all of your prescriptions and are they filled?: Yes  Have you been contacted by a [id number] [employer] Pharmacist?: No  Have you scheduled your follow up appointment?: Yes  How are you going to get to your appointment?: Car - drive self  Were you discharged with any Home Care or Post Acute Services: No  Do you feel like you have everything you need to keep you well at home?: Yes  Care Transitions Interventions     Transitions of Care Initial Call      Challenges to be reviewed by the provider   Additional needs identified to be addressed with provider: No  none             Method of communication with provider : none      Advance Care Planning:   Does patient have an Advance Directive: reviewed and current. Was this a readmission? No  Patient stated reason for admission: food stuck in throat   Patients top risk factors for readmission: lack of knowledge about disease and medical condition-hiatal hernia    Care Transition Nurse (CTN) contacted the patient by telephone to perform post hospital discharge assessment. Verified name and  with patient as identifiers. Provided introduction to self, and explanation of the CTN role. CTN reviewed discharge instructions, medical action plan and red flags with patient who verbalized understanding. Patient given an opportunity to ask questions and does not have any further questions or concerns at this time. Were discharge instructions available to patient? Yes. Reviewed appropriate site of care based on symptoms and resources available to patient including: PCP and Specialist. The patient agrees to contact the PCP office for questions related to their healthcare. Medication reconciliation was performed with patient, who verbalizes understanding of administration of home medications. Advised obtaining a 90-day supply of all daily and as-needed medications. Covid Risk Education     Educated patient about risk for severe COVID-19 due to risk factors according to CDC guidelines. CTN reviewed discharge instructions, medical action plan and red flag symptoms with the patient who verbalized understanding. Discussed COVID vaccination status: Yes. Education provided on COVID-19 vaccination as appropriate. Discussed exposure protocols and quarantine with CDC Guidelines. Patient was given an opportunity to verbalize any questions and concerns and agrees to contact CTN or health care provider for questions related to their healthcare. Reviewed and educated patient on any new and changed medications related to discharge diagnosis. Was patient discharged with a pulse oximeter? No     CTN provided contact information.  Plan for follow-up call in 5-7 days based on severity of symptoms and risk factors.   Plan for next call: symptom management-swallowing, right leg pain        Follow Up  Future Appointments   Date Time Provider Leann Briceño   9/20/2021  3:15 PM Mendy Zuniga MD [facility]   9/7/2022  1:00 PM Patrick Doyle MD [facility] - Klarissa Mccormack RN

## 2021-09-20 ENCOUNTER — OFFICE VISIT (OUTPATIENT)
Dept: SURGERY | Age: 71
End: 2021-09-20
Payer: MEDICARE

## 2021-09-20 VITALS
OXYGEN SATURATION: 97 % | HEIGHT: 68 IN | SYSTOLIC BLOOD PRESSURE: 122 MMHG | WEIGHT: 293 LBS | TEMPERATURE: 97.7 F | HEART RATE: 77 BPM | RESPIRATION RATE: 15 BRPM | DIASTOLIC BLOOD PRESSURE: 75 MMHG | BODY MASS INDEX: 44.41 KG/M2

## 2021-09-20 DIAGNOSIS — K44.9 HIATAL HERNIA: Primary | ICD-10-CM

## 2021-09-20 DIAGNOSIS — R13.19 ESOPHAGEAL DYSPHAGIA: ICD-10-CM

## 2021-09-20 DIAGNOSIS — E66.01 MORBID OBESITY (HCC): ICD-10-CM

## 2021-09-20 PROCEDURE — 99214 OFFICE O/P EST MOD 30 MIN: CPT | Performed by: SURGERY

## 2021-09-20 NOTE — PROGRESS NOTES
Ambrosio South MD   General Surgery  Follow-up patient Evaluation in Office  Pt Name: Bony Craig  Date of Birth 1950   Today's Date: 9/20/2021  Medical Record Number: [id number]  Referring Provider: No ref. provider found  Primary Care Provider: TIMOTHY Girard - CNP  Chief Complaint:  Chief Complaint   Patient presents with   [employer] [facility]     established pt--recurrent hiatal hernia       ASSESSMENT      1. Hiatal hernia    2. Esophageal dysphagia    3. Morbid obesity ([address]    4. Food impaction     PLANS      1. Patient without symptoms since impaction removed endoscopically during recent admission. 2.  Imaging reviewed recurrent hernia not evident on most recent upper GI. Recommend repeat EGD possible dilation wrap may need to be dilated. 3.  Follow-up after repeat endoscopic evaluation/dilation. 4.  Continue PPI Carafate for now  Alex Guerrire is a 70y.o. year old female who is presenting today in the office for evaluation after recent hospitalization. Madelin Powers underwent recent hospitalization for dysphagia. CT imaging showed evidence of recurrent hiatal hernia with obstruction. She had food impaction at the distal esophagus. EGD showed food impaction. Follow-up upper GI hernia was reduced. Prior hiatal hernia repair for chronic reflux which was resolved post procedure. She states that was the best surgery she ever had. Discussed with patient again, high recurrence rate with hiatal hernia repairs. Patient had her hernia repaired with mesh and Nissen fundoplication in December 2018. She did have a fall after that. No significant reflux since. She ate some raw coconut and felt like things stuck. She had no dysphagia prior. Post removal she has felt fine. Past Medical History  Past Medical History:   Diagnosis Date    Abdominal hernia     Cancer ([address]     skin    SCOTT (dyspnea on exertion)     false positive stress test 2014 with normal cardiac cath 2014.     GERD (gastroesophageal reflux disease)     Headache     Migraines    Hyperglycemia 2015    borderline takes Metformin    Hyperlipidemia     Migraine headache     Morbid obesity (HCC)     Osteopenia     RLS (restless legs syndrome)     Type 1 diabetes mellitus (HCC)     UTI (urinary tract infection)     history of       Past Surgical History  Past Surgical History:   Procedure Laterality Date    BUNIONECTOMY Right 1980's    CARPAL TUNNEL RELEASE Bilateral 1990 2008 1991 2016     x2 right x2 left    [facility]  [facility]    GASTRIC FUNDOPLICATION N/A 74/6/8454    ROBOTIC HIATAL HERNIA WITH NISSEN FUNDOPLICATION performed [facility] Left 02/2015    HIP SURGERY      HYSTERECTOMY  1980    JOINT REPLACEMENT Right     KNEE ARTHROPLASTY Left 2007    KNEE ARTHROPLASTY Right 2014    UPPER GASTROINTESTINAL ENDOSCOPY      UPPER GASTROINTESTINAL ENDOSCOPY N/A 11/13/2018    EGD DIAGNOSTIC ONLY performed by Elis Ferrera MD at [facility]   [address] [place] [address] N/A 9/9/2021    EGD FOREIGN BODY REMOVAL performed by Kareen Garner MD at [address] Bilateral 2019       Medications  Current Outpatient Medications   Medication Sig Dispense Refill    metFORMIN (GLUCOPHAGE) 500 MG tablet Take 1 tablet by mouth 2 times daily (with meals) 60 tablet 3    pantoprazole (PROTONIX) 40 MG tablet Take 1 tablet by mouth 2 times daily (before meals) 60 tablet 0    sucralfate (CARAFATE) 1 GM tablet Take 1 tablet by mouth 4 times daily (before meals and nightly) 120 tablet 0    atenolol (TENORMIN) 25 MG tablet take 1/2 tablet by mouth once daily 45 tablet 3    Probiotic Product (PROBIOTIC DAILY PO) Take by mouth daily      VITAMIN D PO Take by mouth daily      melatonin 3 MG TABS tablet Take 1 tablet by mouth nightly as needed (insomnia) 30 tablet 11    DULoxetine (CYMBALTA) 30 MG extended release capsule Take 30 mg by mouth daily      SUMAtriptan (IMITREX) 50 MG tablet Take 50 mg by mouth once as needed for Migraine      atorvastatin (LIPITOR) 10 MG tablet Take 10 mg by mouth daily.  rOPINIRole (REQUIP) 0.5 MG tablet Take 0.5 mg by mouth nightly. No current facility-administered medications for this visit. Allergies   No Known Allergies    Family History  Family History   Problem Relation Age of Onset    Arthritis Mother     High Blood Pressure Mother     Cancer Mother     Obesity Mother     Ovarian Cancer Sister 48    Arthritis Maternal Grandmother     Breast Cancer Maternal Grandmother 68    Cancer Maternal Grandfather         colon    Arthritis Maternal Grandfather        SocialHistory  Social History     Socioeconomic History    Marital status:      Spouse name: Not on file    Number of children: Not on file    Years of education: Not on file    Highest education level: Not on file   Occupational History    Not on file   Tobacco Use    Smoking status: Never Smoker    Smokeless tobacco: Never Used   Vaping Use    Vaping Use: Never used   Substance and Sexual Activity    Alcohol use: No    Drug use: No    Sexual activity: Not on file   Other Topics Concern    Not on file   Social History Narrative    Not on file     Social Determinants of Health     Financial Resource Strain:     Difficulty of Paying Living Expenses:    Food Insecurity:     Worried About Running Out of Food in the Last Year:     [address] in the Last Year:    Transportation Needs:     Lack of Transportation (Medical):      Lack of Transportation (Non-Medical):    Physical Activity:     Days of Exercise per Week:     Minutes of Exercise per Session:    Stress:     Feeling of Stress :    Social Connections:     Frequency of Communication with Friends and Family:     Frequency of Social Gatherings with Friends and Family:     Attends Baptism Services:     Active Member of Clubs or Organizations:     Attends Club or Organization Meetings:     Marital Status:    Intimate Partner Violence:     Fear of Current or Ex-Partner:     Emotionally Abused:     Physically Abused:     Sexually Abused:            Review of Systems  Review of Systems   Constitutional: Negative for chills, fatigue, fever and unexpected weight change. HENT: Negative for congestion, sore throat and trouble swallowing. Denies any difficulty swallowing now   Eyes: Negative for visual disturbance. Respiratory: Negative for cough, shortness of breath and wheezing. Cardiovascular: Negative for chest pain and palpitations. Gastrointestinal: Negative for abdominal pain, blood in stool, nausea and vomiting. Endocrine: Negative for cold intolerance, heat intolerance and polydipsia. Genitourinary: Positive for flank pain. Negative for dysuria and hematuria. Musculoskeletal: Positive for arthralgias. Negative for gait problem, joint swelling and myalgias. Skin: Negative for color change and rash. Allergic/Immunologic: Negative for immunocompromised state. Neurological: Negative for dizziness, tremors, seizures and speech difficulty. Hematological: Does not bruise/bleed easily. Psychiatric/Behavioral: Negative for behavioral problems and confusion. OBJECTIVE     /75 (Site: Right Lower Arm, Position: Sitting, Cuff Size: Medium Adult)   Pulse 77   Temp 97.7 °F (36.5 °C) (Tympanic)   Resp 15   Ht 5' 8\" (1.727 m)   Wt (!) 314 lb (142.4 kg)   SpO2 97%   BMI 47.74 kg/m²      Physical Exam  Vitals reviewed. Constitutional:       Appearance: She is well-developed. She is obese. HENT:      Head: Normocephalic and atraumatic. Eyes:      General: No scleral icterus. Pupils: Pupils are equal, round, and reactive to light. Neck:      Thyroid: No thyromegaly. Vascular: No JVD. Trachea: No tracheal deviation. Cardiovascular:      Rate and Rhythm: Normal rate.       Heart sounds: Normal heart sounds. Pulmonary:      Effort: Pulmonary effort is normal. No respiratory distress. Breath sounds: Normal breath sounds. No wheezing. Abdominal:      General: Bowel sounds are normal. There is no distension. Palpations: Abdomen is soft. There is no mass. Tenderness: There is no abdominal tenderness. There is no guarding or rebound. Hernia: No hernia is present. Musculoskeletal:         General: No tenderness or deformity. Cervical back: Normal range of motion and neck supple. Lymphadenopathy:      Cervical: No cervical adenopathy. Skin:     General: Skin is warm and dry. Coloration: Skin is not jaundiced or pale. Findings: No rash. Neurological:      General: No focal deficit present. Mental Status: She is alert and oriented to person, place, and time. Cranial Nerves: No cranial nerve deficit. Psychiatric:         Mood and Affect: Mood normal.         Thought Content: Thought content normal.         Lab Results   Component Value Date    WBC 6.9 09/10/2021    HGB 12.9 09/10/2021    HCT 39.6 09/10/2021     09/10/2021    CHOL 188 11/11/2020    TRIG 149 11/11/2020    HDL 49 11/11/2020    ALT 9 (L) 11/11/2020    AST 11 11/11/2020     09/10/2021    K 4.2 09/10/2021     09/10/2021    CREATININE 0.7 09/10/2021    BUN 15 09/10/2021    CO2 22 (L) 09/10/2021    TSH 1.120 06/03/2021    LABA1C 6.4 01/08/2019    LABMICR < 1.20 11/11/2020          PROCEDURE: ESOPHAGRAM AND UPPER GI SERIES:       CLINICAL INFORMATION: Difficulty swallowing. History of hiatus hernia with prior repair.       COMPARISON: No prior study.       TECHNIQUE: The patient was given barium and sparkles carbonated crystals to swallow under fluoroscopic visualization. Multiple fluoroscopic images of the esophagus and stomach were obtained.       FINDINGS: The swallowing mechanism is seen to be normal. Barium passes freely through the esophagus into the stomach. There is a small hiatus hernia but no evidence for gastroesophageal reflux. No esophageal stricture, mass lesion, or perforation is    seen. The patient had no difficulty swallowing the barium. Note that there is mild deformity of the gastric fundus on initial upright images, no doubt reflecting prior hernia repair surgery. The gastric fundus distends normally, however, following    administration of sparkles crystals. Barium passes freely into duodenum. No ulceration is seen. No gastric mass lesion is identified.           Impression   1. Normal esophagram.   2. Evidence for prior hiatus hernia repair. 3. Only a tiny hiatus hernia is seen on today's study with no evidence for reflux.               **This report has been created using voice recognition software.  It may contain minor errors which are inherent in voice recognition technology. **       Final report electronically signed by Dr. Laron Gould on 9/11/2021 9:04 AM              PROCEDURE: CT ABDOMEN PELVIS W IV CONTRAST       CLINICAL INFORMATION: abdominal pain .       COMPARISON: CT abdomen pelvis dated 8/2/2017.       TECHNIQUE: Axial 5 mm CT images were obtained through the abdomen and pelvis after the administration of 80 mL Isovue-370 injected in the right hand with sagittal and coronal reconstructions.       All CT scans at this facility use dose modulation, iterative reconstruction, and/or weight-based dosing when appropriate to reduce radiation dose to as low as reasonably achievable.       FINDINGS:    There are stable small calcified nodules in the left lung with left hilar calcifications is evidence for old granulomatous disease. There are minimal posterior dependent changes. Visualized portions of the lungs are otherwise clear. The visualized    portion of the heart is unremarkable.  There is a small to moderate-sized hiatal hernia, decreased in size compared to prior exam.       The liver is diffusely hypodense without focal lesion identified. There is a small hyperdense stone in the neck of the gallbladder. Adrenal glands and kidneys are unremarkable. There are scattered calcified granulomas in the spleen, stable compared to    prior exam. The pancreas is partially fatty replaced, stable compared to prior exam. No retroperitoneal or mesenteric lymphadenopathy is identified.       The large and small bowel appear within normal limits. The appendix is normal in appearance. The unopacified bladder is unremarkable. The uterus appears to be surgically absent. No free fluid is identified. Redemonstration of bilateral hip arthroplasties    with associated streak artifact partially obscuring the pelvis. There are stable degenerative changes of the lumbar spine.           Impression       1. No evidence of acute intra-abdominal or intrapelvic abnormality. 2. Moderate hiatal hernia. 3. Hepatic steatosis. 4. Cholelithiasis.                 **This report has been created using voice recognition software. It may contain minor errors which are inherent in voice recognition technology. **       Final report electronically signed by Dr. Ainsley Rios MD on 9/8/2021 3:57 PM         Imaging reviewed

## 2021-09-21 ENCOUNTER — CARE COORDINATION (OUTPATIENT)
Dept: CASE MANAGEMENT | Age: 71
End: 2021-09-21

## 2021-09-21 NOTE — CARE COORDINATION
[facility] Transitions Follow Up Call    2021    Patient: Chuyita Nam  Patient : 1950   MRN: [id number]  Reason for Admission: Dysphagia, unspecified type  Discharge Date: 21 RARS: No data recorded       Spoke with Desean Hardwick, said she is doing ok. Denies any dizziness, right leg is doing better, still purple. Saw Dr Elke Parrish yesterday and wants her to have her esophagus stretched and then will decide about the hernia. Has an appt next week with Dr Chanelle Little. No other questions or concerns at this time. Will continue to follow. Care Transitions Subsequent and Final Call    Subsequent and Final Calls  Do you have any ongoing symptoms?: No  Have your medications changed?: No  Do you have any questions related to your medications?: No  Do you currently have any active services?: No  Do you have any needs or concerns that I can assist you with?: No  Identified Barriers: Lack of Education  Care Transitions Interventions  Other Interventions:       Care Transitions Follow Up Call    Needs to be reviewed by the provider   Additional needs identified to be addressed with provider: No  none             Method of communication with provider : none      Care Transition Nurse (CTN) contacted the patient by telephone to follow up after admission on . Verified name and  with patient as identifiers. Addressed changes since last contact: none  Discussed follow-up appointments. If no appointment was previously scheduled, appointment scheduling offered: na.   Is follow up appointment scheduled within 7 days of discharge? Yes. Advance Care Planning:   Does patient have an Advance Directive: reviewed and current. CTN reviewed discharge instructions, medical action plan and red flags with patient and discussed any barriers to care and/or understanding of plan of care after discharge.  Discussed appropriate site of care based on symptoms and resources available to patient including: PCP and Specialist. The patient agrees to contact the PCP office for questions related to their healthcare. Patients top risk factors for readmission: lack of knowledge about disease and medical condition-dysphagia, CAD, DM  Interventions to address risk factors: Scheduled appointment with Roseanne Quiroga-[facility] follow up appointment(s): Dr Rochelle Vergara 9/28    CTN provided contact information for future needs. Plan for follow-up call in 10-14 days based on severity of symptoms and risk factors.   Plan for next call: discuss results from Dr Mitesh Arredondo   Date Time Provider Leann Briceño   9/7/2022  1:00 PM Ya Mari, [address] [place], RN

## 2021-09-23 ASSESSMENT — ENCOUNTER SYMPTOMS
COLOR CHANGE: 0
ABDOMINAL PAIN: 0
SORE THROAT: 0
SHORTNESS OF BREATH: 0
WHEEZING: 0
COUGH: 0
BLOOD IN STOOL: 0
VOMITING: 0
TROUBLE SWALLOWING: 0
NAUSEA: 0

## 2021-10-05 ENCOUNTER — CARE COORDINATION (OUTPATIENT)
Dept: CASE MANAGEMENT | Age: 71
End: 2021-10-05

## 2021-10-05 NOTE — CARE COORDINATION
readmission: lack of knowledge about disease and medical condition-CAD, DM  Interventions to address risk factors: will reach out to PCP or other providers if any issues      Non-[facility] follow up appointment(s): jolynn    CTN provided contact information for future needs. No further follow-up call indicated based on severity of symptoms and risk factors.           Follow Up  Future Appointments   Date Time Provider Leann Briceño   9/7/2022  1:00 [facility]

## 2022-01-10 RX ORDER — ATENOLOL 25 MG/1
TABLET ORAL
Qty: 45 TABLET | Refills: 3 | Status: SHIPPED | OUTPATIENT
Start: 2022-01-10

## 2022-07-20 ENCOUNTER — HOSPITAL ENCOUNTER (OUTPATIENT)
Dept: WOMENS IMAGING | Age: 72
Discharge: HOME | End: 2022-07-20
Payer: MEDICARE

## 2022-07-20 DIAGNOSIS — Z12.31 VISIT FOR SCREENING MAMMOGRAM: ICD-10-CM

## 2022-07-20 PROCEDURE — 77063 BREAST TOMOSYNTHESIS BI: CPT

## 2022-08-30 ENCOUNTER — NURSE ONLY (OUTPATIENT)
Dept: LAB | Age: 72
End: 2022-08-30

## 2022-08-30 LAB
ALBUMIN SERPL-MCNC: 4.3 G/DL (ref 3.5–5.1)
ALP BLD-CCNC: 81 U/L (ref 38–126)
ALT SERPL-CCNC: 9 U/L (ref 11–66)
ANION GAP SERPL CALCULATED.3IONS-SCNC: 12 MEQ/L (ref 8–16)
AST SERPL-CCNC: 11 U/L (ref 5–40)
BILIRUB SERPL-MCNC: 0.6 MG/DL (ref 0.3–1.2)
BUN BLDV-MCNC: 10 MG/DL (ref 7–22)
CALCIUM SERPL-MCNC: 9.5 MG/DL (ref 8.5–10.5)
CHLORIDE BLD-SCNC: 103 MEQ/L (ref 98–111)
CO2: 26 MEQ/L (ref 23–33)
CREAT SERPL-MCNC: 0.7 MG/DL (ref 0.4–1.2)
ERYTHROCYTE [DISTWIDTH] IN BLOOD BY AUTOMATED COUNT: 13.5 % (ref 11.5–14.5)
ERYTHROCYTE [DISTWIDTH] IN BLOOD BY AUTOMATED COUNT: 43.7 FL (ref 35–45)
GFR SERPL CREATININE-BSD FRML MDRD: 82 ML/MIN/1.73M2
GLUCOSE BLD-MCNC: 134 MG/DL (ref 70–108)
HCT VFR BLD CALC: 41.8 % (ref 37–47)
HEMOGLOBIN: 13.8 GM/DL (ref 12–16)
MCH RBC QN AUTO: 29.5 PG (ref 26–33)
MCHC RBC AUTO-ENTMCNC: 33 GM/DL (ref 32.2–35.5)
MCV RBC AUTO: 89.3 FL (ref 81–99)
PLATELET # BLD: 232 THOU/MM3 (ref 130–400)
PMV BLD AUTO: 10.3 FL (ref 9.4–12.4)
POTASSIUM SERPL-SCNC: 4.7 MEQ/L (ref 3.5–5.2)
RBC # BLD: 4.68 MILL/MM3 (ref 4.2–5.4)
SODIUM BLD-SCNC: 141 MEQ/L (ref 135–145)
TOTAL PROTEIN: 6.2 G/DL (ref 6.1–8)
TSH SERPL DL<=0.05 MIU/L-ACNC: 1.63 UIU/ML (ref 0.4–4.2)
WBC # BLD: 6.3 THOU/MM3 (ref 4.8–10.8)

## 2022-09-07 ENCOUNTER — OFFICE VISIT (OUTPATIENT)
Dept: CARDIOLOGY CLINIC | Age: 72
End: 2022-09-07
Payer: MEDICARE

## 2022-09-07 VITALS
HEIGHT: 68 IN | DIASTOLIC BLOOD PRESSURE: 78 MMHG | WEIGHT: 293 LBS | BODY MASS INDEX: 44.41 KG/M2 | SYSTOLIC BLOOD PRESSURE: 138 MMHG | HEART RATE: 77 BPM

## 2022-09-07 DIAGNOSIS — I25.10 CORONARY ARTERY DISEASE INVOLVING NATIVE CORONARY ARTERY OF NATIVE HEART WITHOUT ANGINA PECTORIS: Primary | ICD-10-CM

## 2022-09-07 DIAGNOSIS — E78.01 FAMILIAL HYPERCHOLESTEROLEMIA: ICD-10-CM

## 2022-09-07 DIAGNOSIS — I10 ESSENTIAL HYPERTENSION: ICD-10-CM

## 2022-09-07 PROCEDURE — 99213 OFFICE O/P EST LOW 20 MIN: CPT | Performed by: NUCLEAR MEDICINE

## 2022-09-07 PROCEDURE — 93000 ELECTROCARDIOGRAM COMPLETE: CPT | Performed by: NUCLEAR MEDICINE

## 2022-09-07 PROCEDURE — 1123F ACP DISCUSS/DSCN MKR DOCD: CPT | Performed by: NUCLEAR MEDICINE

## 2022-09-07 NOTE — PROGRESS NOTES
[address] [facility] [place] [address]  [facility] [place] [address]  Dept: [phone]  Dept Fax: [phone]  Loc: [phone]    Visit Date: 9/7/2022    Song Hernandez is a 67 y.o. female who presents todayfor:  Chief Complaint   Patient presents with    Coronary Artery Disease    Hypertension     Known mild CAD  No chest pain   No changes in breathing  BP is stable   No dizziness  No syncope  She is active for age       HPI:  HPI  Past Medical History:   Diagnosis Date    Abdominal hernia     Cancer ([facility]     skin    SCOTT (dyspnea on exertion)     false positive stress test 2014 with normal cardiac cath 2014.     GERD (gastroesophageal reflux disease)     Headache     Migraines    Hyperglycemia 2015    borderline takes Metformin    Hyperlipidemia     Migraine headache     Morbid obesity (HCC)     Osteopenia     RLS (restless legs syndrome)     Type 1 diabetes mellitus ([facility]     UTI (urinary tract infection)     history of      Past Surgical History:   Procedure Laterality Date    BUNIONECTOMY Right 1980's    CARPAL TUNNEL RELEASE Bilateral 1990 2008 12 2016     x2 right x2 left    [facility]  [facility]    GASTRIC FUNDOPLICATION N/A 97/3/8559    ROBOTIC HIATAL HERNIA WITH NISSEN FUNDOPLICATION performed by Dustin Adam MD at [address] Left 02/2015    HIP SURGERY      HYSTERECTOMY (CERVIX STATUS UNKNOWN)  [address] Right     KNEE ARTHROPLASTY Left 2007    KNEE ARTHROPLASTY Right 2014    UPPER GASTROINTESTINAL ENDOSCOPY      UPPER GASTROINTESTINAL ENDOSCOPY N/A 11/13/2018    EGD DIAGNOSTIC ONLY performed by Bryanna Guerrero MD at [facility] 1 N/A 9/9/2021    EGD FOREIGN BODY REMOVAL performed by Jewel Castillo MD at [facility] 96 Bilateral 2019     Family History   Problem Relation Age of Onset    Arthritis Mother     High Blood Pressure Mother     Cancer Mother     Obesity Mother     Ovarian Cancer Sister 48    Arthritis Maternal Grandmother     Breast Cancer Maternal Grandmother 68    Cancer Maternal Grandfather         colon    Arthritis Maternal Grandfather      Social History     Tobacco Use    Smoking status: Never    Smokeless tobacco: Never   Substance Use Topics    Alcohol use: No      Current Outpatient Medications   Medication Sig Dispense Refill    atenolol (TENORMIN) 25 MG tablet take 1/2 tablet by mouth once daily 45 tablet 3    metFORMIN (GLUCOPHAGE) 500 MG tablet Take 1 tablet by mouth 2 times daily (with meals) 60 tablet 3    pantoprazole (PROTONIX) 40 MG tablet Take 1 tablet by mouth 2 times daily (before meals) 60 tablet 0    sucralfate (CARAFATE) 1 GM tablet Take 1 tablet by mouth 4 times daily (before meals and nightly) 120 tablet 0    Probiotic Product (PROBIOTIC DAILY PO) Take by mouth daily      VITAMIN D PO Take by mouth daily      melatonin 3 MG TABS tablet Take 1 tablet by mouth nightly as needed (insomnia) 30 tablet 11    DULoxetine (CYMBALTA) 30 MG extended release capsule Take 30 mg by mouth daily      SUMAtriptan (IMITREX) 50 MG tablet Take 50 mg by mouth once as needed for Migraine      atorvastatin (LIPITOR) 10 MG tablet Take 10 mg by mouth daily. rOPINIRole (REQUIP) 0.5 MG tablet Take 0.5 mg by mouth nightly. No current facility-administered medications for this visit.      No Known Allergies  Health Maintenance   Topic Date Due    Annual Wellness Visit (AWV)  Never done    Depression Screen  Never done    Hepatitis C screen  Never done    DEXA (modify frequency per FRAX score)  Never done    Pneumococcal 65+ years Vaccine (1 - PCV) 08/22/2015    Colorectal Cancer Screen  07/17/2016    A1C test (Diabetic or Prediabetic)  01/08/2020    COVID-19 Vaccine (2 - Moderna series) 03/10/2021    Lipids  11/11/2021    Flu vaccine (1) 09/01/2022    Breast cancer screen  07/20/2024    DTaP/Tdap/Td vaccine (2 - Td or Tdap) 10/08/2029    Shingles vaccine  Completed    Hepatitis A vaccine  Aged Out    Hepatitis B vaccine  Aged Out    Hib vaccine  Aged Out    Meningococcal (ACWY) vaccine  Aged Out       Subjective:  Review of Systems  General:   No fever, no chills, No fatigue or weight loss  Pulmonary:    No dyspnea, no wheezing  Cardiac:    Denies recent chest pain,   GI:     No nausea or vomiting, no abdominal pain  Neuro:    No dizziness or light headedness,   Musculoskeletal:  No recent active issues  Extremities:   No edema, no obvious claudication     Objective:  Physical Exam  /78   Pulse 77   Ht 5' 8\" (1.727 m)   Wt 299 lb (135.6 kg)   BMI 45.46 kg/m²   General:   Well developed, well nourished  Lungs:   Clear to auscultation  Heart:    Normal S1 S2, Slight murmur. no rubs, no gallops  Abdomen:   Soft, non tender, no organomegalies, positive bowel sounds  Extremities:   No edema, no cyanosis, good peripheral pulses  Neurological:   Awake, alert, oriented. No obvious focal deficits  Musculoskelatal:  No obvious deformities    Assessment:      Diagnosis Orders   1. Coronary artery disease involving native coronary artery of native heart without angina pectoris  EKG 12 lead      2. Essential hypertension  EKG 12 lead      3. Familial hypercholesterolemia  EKG 12 lead      As above  Cardiac fair for now   ECG in office was done today. I reviewed the ECG. No acute findings      Plan:  No follow-ups on file. As above  Continue risk factor modification and medical management  Thank you for allowing me to participate in the care of your patient. Please don't hesitate to contact me regarding any further issues related to the patient care    Orders Placed:  Orders Placed This Encounter   Procedures    EKG 12 lead     Order Specific Question:   Reason for Exam?     Answer: Other       Medications Prescribed:  No orders of the defined types were placed in this encounter.          Discussed use, benefit, and side effects of prescribed medications. All patient questions answered. Pt voicedunderstanding. Instructed to continue current medications, diet and exercise. Continue risk factor modification and medical management. Patient agreed with treatment plan. Follow up as directed.     Electronically signedby Leslie Samano MD on 9/7/2022 at 1:08 PM

## 2022-10-03 NOTE — PROGRESS NOTES
[address] [facility] [address]  [place] [address]  Dept: [phone]  Dept Fax: [phone]  Loc: [phone]    Visit Date: 9/1/2021    Lin Negrete is a 70 y.o. female who presents todayfor:  Chief Complaint   Patient presents with    Check-Up    Hypertension    Coronary Artery Disease    Hyperlipidemia     Going for hand surgery   Cath before  Mild CAD  Stress test last year was okay   No chest pain   No changes in breathing  weight issues  No active cardiac issues      HPI:  HPI  Past Medical History:   Diagnosis Date    Abdominal hernia     Cancer ([address]     skin    SCOTT (dyspnea on exertion)     false positive stress test 2014 with normal cardiac cath 2014.     GERD (gastroesophageal reflux disease)     Headache     Migraines    Hyperglycemia 2015    borderline takes Metformin    Hyperlipidemia     Migraine headache     Morbid obesity (HCC)     Osteopenia     RLS (restless legs syndrome)     Type 1 diabetes mellitus ([address]     UTI (urinary tract infection)     history of      Past Surgical History:   Procedure Laterality Date    BUNIONECTOMY Right 1980's    CARPAL TUNNEL RELEASE Bilateral 1990 2008 Lindalee Antonio 2016     x2 right x2 left    [facility]  [facility]    [facility]    GASTRIC FUNDOPLICATION N/A 30/1/5317    ROBOTIC HIATAL HERNIA WITH NISSEN FUNDOPLICATION performed by Angel Das MD at [facility] Left 02/2015     [facility]    JOINT REPLACEMENT Right     KNEE ARTHROPLASTY Left 2007    KNEE ARTHROPLASTY Right 2014    UPPER GASTROINTESTINAL ENDOSCOPY      UPPER GASTROINTESTINAL ENDOSCOPY N/A 11/13/2018    EGD DIAGNOSTIC ONLY performed by Adam Fulton MD at [facility]    VEIN SURGERY Bilateral 2019     Family History   Problem Relation Age of Onset    Arthritis Mother     High Blood Pressure Mother     Cancer Mother     Obesity Mother     Ovarian Cancer Sister 48    Arthritis Maternal Grandmother     Breast Cancer Maternal Grandmother 68    Cancer Maternal Grandfather         colon    Arthritis Maternal Grandfather      Social History     Tobacco Use    Smoking status: Never Smoker    Smokeless tobacco: Never Used   Substance Use Topics    Alcohol use: No      Current Outpatient Medications   Medication Sig Dispense Refill    alendronate (FOSAMAX) 35 MG tablet Take 35 mg by mouth every 7 days      atenolol (TENORMIN) 25 MG tablet take 1/2 tablet by mouth once daily 45 tablet 3    Multiple Vitamins-Minerals (PRESERVISION AREDS 2 PO) Take by mouth daily      Probiotic Product (PROBIOTIC DAILY PO) Take by mouth daily      VITAMIN D PO Take by mouth daily      melatonin 3 MG TABS tablet Take 1 tablet by mouth nightly as needed (insomnia) 30 tablet 11    linagliptin (TRADJENTA) 5 MG tablet Take 5 mg by mouth daily      metFORMIN (GLUCOPHAGE) 500 MG tablet Take 500 mg by mouth 2 times daily (with meals)       DULoxetine (CYMBALTA) 30 MG extended release capsule Take 30 mg by mouth daily      SUMAtriptan (IMITREX) 50 MG tablet Take 50 mg by mouth once as needed for Migraine      naproxen (NAPROSYN) 500 MG tablet Take 500 mg by mouth as needed for Pain      atorvastatin (LIPITOR) 10 MG tablet Take 10 mg by mouth daily.  rOPINIRole (REQUIP) 0.5 MG tablet Take 0.5 mg by mouth nightly. No current facility-administered medications for this visit.      No Known Allergies  Health Maintenance   Topic Date Due    Hepatitis C screen  Never done    Colon cancer screen colonoscopy  Never done    DEXA (modify frequency per FRAX score)  Never done    Pneumococcal 65+ years Vaccine (1 of 1 - PPSV23) 03/25/2015    Annual Wellness Visit (AWV)  Never done    A1C test (Diabetic or Prediabetic)  01/08/2020    Flu vaccine (1) Never done    Lipid screen  11/11/2021    Breast cancer screen  06/30/2023    DTaP/Tdap/Td vaccine (2 - Td or Tdap) 10/08/2029    Shingles Vaccine  Completed    COVID-19 Vaccine  Completed    Hepatitis A vaccine  Aged Out    Hepatitis B vaccine  Aged Out    Hib vaccine  Aged Out    Meningococcal (ACWY) vaccine  Aged Out       Subjective:  Review of Systems  General:   No fever, no chills, No fatigue or weight loss  Pulmonary:    some dyspnea, no wheezing  Cardiac:    Denies recent chest pain,   GI:     No nausea or vomiting, no abdominal pain  Neuro:    No dizziness or light headedness,   Musculoskeletal:  No recent active issues  Extremities:   No edema, no obvious claudication       Objective:  Physical Exam  BP (!) 142/80   Pulse 76   Ht 5' 8\" (1.727 m)   Wt (!) 318 lb (144.2 kg)   BMI 48.35 kg/m²   General:   Well developed, well nourished  Lungs:   Clear to auscultation  Heart:    Normal S1 S2, Slight murmur. no rubs, no gallops  Abdomen:   Soft, non tender, no organomegalies, positive bowel sounds  Extremities:   No edema, no cyanosis, good peripheral pulses  Neurological:   Awake, alert, oriented. No obvious focal deficits  Musculoskelatal:  No obvious deformities    Assessment:      Diagnosis Orders   1. Coronary artery disease involving native coronary artery of native heart without angina pectoris  EKG 12 lead   2. Essential hypertension  EKG 12 lead   3. Familial hypercholesterolemia  EKG 12 lead   as above  Cardiac stable   ECG in office was done today. I reviewed the ECG. No acute findings      Plan:  No follow-ups on file. Clear for surgery   Continue risk factor modification and medical management    Thank you for allowing me to participate in the care of your patient. Please don't hesitate to contact me regarding any further issues related to the patient care    Orders Placed:  Orders Placed This Encounter   Procedures    EKG 12 lead     Order Specific Question:   Reason for Exam?     Answer:    Other       Medications Prescribed:  No orders of the defined types were placed in this encounter. Discussed use, benefit, and side effects of prescribed medications. All patient questions answered. Pt voicedunderstanding. Instructed to continue current medications, diet and exercise. Continue risk factor modification and medical management. Patient agreed with treatment plan. Follow up as directed.     Electronically signedby Maldonado Nicholson MD on 9/1/2021 at 1:48 PM Chart reviewed, patient examined. Pertinent results reviewed.  Case discussed with HO; specialist f/u reviewed  HD#1    Called me c/o L sided abdominal pain. I instructed patient to go to the ED, where she was evaluated and Dx with Acute Pancreatitis as noted.  She was feeling mod better today, and was able to eat a solid breakfast;  Patient known to drink little ETOH; s/p cholecystectomy    Vital Signs Last 24 Hrs  T(C): 36.7 (03 Oct 2022 21:58), Max: 37.2 (03 Oct 2022 13:26)  T(F): 98.1 (03 Oct 2022 21:58), Max: 98.9 (03 Oct 2022 13:26)  HR: 67 (03 Oct 2022 21:58) (67 - 89)  BP: 120/79 (03 Oct 2022 21:58) (118/60 - 136/66)  BP(mean): --  RR: 18 (03 Oct 2022 21:58) (18 - 18)  SpO2: 94% (03 Oct 2022 21:58) (94% - 94%)        Parameters below as of 03 Oct 2022 21:58  Patient On (Oxygen Delivery Method): room air  PE: obese with B/L leg edema;  ABD: TTP in epigastrium- mild, no R/G/Mass;   CT and labs noted    Agree W A&P;   Acute Pancreatitis- ? cause- doubt ETOH or stone obstruction;    ? her meds; eg diuretics    See progress note

## 2022-12-19 LAB
AVERAGE GLUCOSE: NORMAL
HBA1C MFR BLD: 6.6 %

## 2022-12-20 ENCOUNTER — NURSE ONLY (OUTPATIENT)
Dept: LAB | Age: 72
End: 2022-12-20

## 2022-12-20 LAB
ALBUMIN SERPL-MCNC: 3.8 G/DL (ref 3.5–5.1)
ALP BLD-CCNC: 80 U/L (ref 38–126)
ALT SERPL-CCNC: 9 U/L (ref 11–66)
AMORPHOUS: ABNORMAL
ANION GAP SERPL CALCULATED.3IONS-SCNC: 12 MEQ/L (ref 8–16)
AST SERPL-CCNC: 11 U/L (ref 5–40)
BACTERIA: ABNORMAL
BASOPHILS # BLD: 0.6 %
BASOPHILS ABSOLUTE: 0 THOU/MM3 (ref 0–0.1)
BILIRUB SERPL-MCNC: 0.7 MG/DL (ref 0.3–1.2)
BILIRUBIN URINE: ABNORMAL
BLOOD, URINE: NEGATIVE
BUN BLDV-MCNC: 13 MG/DL (ref 7–22)
CALCIUM SERPL-MCNC: 9.2 MG/DL (ref 8.5–10.5)
CASTS: ABNORMAL /LPF
CHARACTER, URINE: ABNORMAL
CHLORIDE BLD-SCNC: 101 MEQ/L (ref 98–111)
CHOLESTEROL, TOTAL: 159 MG/DL (ref 100–199)
CO2: 26 MEQ/L (ref 23–33)
COLOR: YELLOW
CREAT SERPL-MCNC: 0.7 MG/DL (ref 0.4–1.2)
CREATININE, URINE: 403.9 MG/DL
CRYSTALS: ABNORMAL
EOSINOPHIL # BLD: 2.3 %
EOSINOPHILS ABSOLUTE: 0.1 THOU/MM3 (ref 0–0.4)
EPITHELIAL CELLS, UA: ABNORMAL /HPF
ERYTHROCYTE [DISTWIDTH] IN BLOOD BY AUTOMATED COUNT: 13.2 % (ref 11.5–14.5)
ERYTHROCYTE [DISTWIDTH] IN BLOOD BY AUTOMATED COUNT: 42.8 FL (ref 35–45)
GFR SERPL CREATININE-BSD FRML MDRD: > 60 ML/MIN/1.73M2
GLUCOSE BLD-MCNC: 141 MG/DL (ref 70–108)
GLUCOSE, URINE: NEGATIVE MG/DL
HCT VFR BLD CALC: 41.3 % (ref 37–47)
HDLC SERPL-MCNC: 38 MG/DL
HEMOGLOBIN: 13.7 GM/DL (ref 12–16)
ICTOTEST: NEGATIVE
IMMATURE GRANS (ABS): 0.01 THOU/MM3 (ref 0–0.07)
IMMATURE GRANULOCYTES %: 0.2 %
KETONES, URINE: NEGATIVE
LDL CHOLESTEROL CALCULATED: 82 MG/DL
LEUKOCYTE EST, POC: NEGATIVE
LYMPHOCYTES # BLD: 25.2 %
LYMPHOCYTES ABSOLUTE: 1.3 THOU/MM3 (ref 1–4.8)
MCH RBC QN AUTO: 29.2 PG (ref 26–33)
MCHC RBC AUTO-ENTMCNC: 33.2 GM/DL (ref 32.2–35.5)
MCV RBC AUTO: 88.1 FL (ref 81–99)
MICROALBUMIN UR-MCNC: 2.95 MG/DL
MICROALBUMIN/CREAT UR-RTO: 7 MG/G (ref 0–30)
MONOCYTES # BLD: 8 %
MONOCYTES ABSOLUTE: 0.4 THOU/MM3 (ref 0.4–1.3)
NITRITE, URINE: NEGATIVE
NUCLEATED RED BLOOD CELLS: 0 /100 WBC
PH UA: 6 (ref 5–9)
PLATELET # BLD: 235 THOU/MM3 (ref 130–400)
PMV BLD AUTO: 10 FL (ref 9.4–12.4)
POTASSIUM SERPL-SCNC: 4.3 MEQ/L (ref 3.5–5.2)
PROTEIN UA: ABNORMAL MG/DL
RBC # BLD: 4.69 MILL/MM3 (ref 4.2–5.4)
RBC URINE: ABNORMAL /HPF
SEG NEUTROPHILS: 63.7 %
SEGMENTED NEUTROPHILS ABSOLUTE COUNT: 3.4 THOU/MM3 (ref 1.8–7.7)
SODIUM BLD-SCNC: 139 MEQ/L (ref 135–145)
SPECIFIC GRAVITY UA: >= 1.03 (ref 1–1.03)
TOTAL PROTEIN: 6.7 G/DL (ref 6.1–8)
TRIGL SERPL-MCNC: 197 MG/DL (ref 0–199)
TSH SERPL DL<=0.05 MIU/L-ACNC: 1.99 UIU/ML (ref 0.4–4.2)
UROBILINOGEN, URINE: 1 EU/DL (ref 0–1)
WBC # BLD: 5.3 THOU/MM3 (ref 4.8–10.8)
WBC UA: ABNORMAL /HPF

## 2023-01-05 ENCOUNTER — TELEPHONE (OUTPATIENT)
Dept: CARDIOLOGY CLINIC | Age: 73
End: 2023-01-05

## 2023-01-05 RX ORDER — ATENOLOL 25 MG/1
TABLET ORAL
Qty: 45 TABLET | Refills: 2 | Status: SHIPPED | OUTPATIENT
Start: 2023-01-05

## 2023-01-05 NOTE — TELEPHONE ENCOUNTER
Pre op Risk Assessment    Procedure L REVERSE TOTAL SHOULDER  Physician DR Isha Bernard  Date of surgery/procedure TBD    Last OV 9-7-22  Last Stress 9-14-20  Last Echo 8-24-20  Last Cath 6-26-14  Last Stent NONE IN EPIC  Is patient on blood thinners NO    [phone]

## 2023-06-27 LAB
AVERAGE GLUCOSE: NORMAL
HBA1C MFR BLD: 6.3 %

## 2023-07-10 ENCOUNTER — TELEPHONE (OUTPATIENT)
Dept: CARDIOLOGY CLINIC | Age: 73
End: 2023-07-10

## 2023-07-10 ENCOUNTER — PATIENT MESSAGE (OUTPATIENT)
Dept: CARDIOLOGY CLINIC | Age: 73
End: 2023-07-10

## 2023-07-10 NOTE — TELEPHONE ENCOUNTER
----- Message [facility] [address] [facility] sent at 7/8/2023 12:44 PM EDT -----  Regarding: Script  Contact: [phone]  Can I be cleared to take weight loss Med like Ozempic?

## 2023-07-21 ENCOUNTER — HOSPITAL ENCOUNTER (OUTPATIENT)
Dept: WOMENS IMAGING | Age: 73
Discharge: HOME | End: 2023-07-21
Payer: MEDICARE

## 2023-07-21 DIAGNOSIS — Z12.31 VISIT FOR SCREENING MAMMOGRAM: ICD-10-CM

## 2023-07-21 PROCEDURE — 77063 BREAST TOMOSYNTHESIS BI: CPT

## 2023-08-07 ENCOUNTER — HOSPITAL ENCOUNTER (OUTPATIENT)
Dept: WOMENS IMAGING | Age: 73
Discharge: HOME | End: 2023-08-07
Payer: MEDICARE

## 2023-08-07 DIAGNOSIS — M81.0 POST-MENOPAUSAL OSTEOPOROSIS: ICD-10-CM

## 2023-08-07 PROCEDURE — 77080 DXA BONE DENSITY AXIAL: CPT

## 2023-09-13 ENCOUNTER — OFFICE VISIT (OUTPATIENT)
Dept: CARDIOLOGY CLINIC | Age: 73
End: 2023-09-13
Payer: MEDICARE

## 2023-09-13 VITALS — HEIGHT: 68 IN | WEIGHT: 293 LBS | BODY MASS INDEX: 44.41 KG/M2

## 2023-09-13 DIAGNOSIS — I25.10 CORONARY ARTERY DISEASE INVOLVING NATIVE CORONARY ARTERY OF NATIVE HEART WITHOUT ANGINA PECTORIS: Primary | ICD-10-CM

## 2023-09-13 DIAGNOSIS — I10 PRIMARY HYPERTENSION: ICD-10-CM

## 2023-09-13 PROCEDURE — 99214 OFFICE O/P EST MOD 30 MIN: CPT | Performed by: NUCLEAR MEDICINE

## 2023-09-13 PROCEDURE — 1123F ACP DISCUSS/DSCN MKR DOCD: CPT | Performed by: NUCLEAR MEDICINE

## 2023-09-13 RX ORDER — ESCITALOPRAM OXALATE 10 MG/1
10 TABLET ORAL DAILY
COMMUNITY

## 2023-09-13 RX ORDER — CYANOCOBALAMIN (VITAMIN B-12) 500 MCG
1 TABLET ORAL
COMMUNITY

## 2023-10-02 RX ORDER — ATENOLOL 25 MG/1
TABLET ORAL
Qty: 45 TABLET | Refills: 3 | Status: SHIPPED | OUTPATIENT
Start: 2023-10-02

## 2024-08-01 ENCOUNTER — OFFICE VISIT (OUTPATIENT)
Dept: CARDIOLOGY CLINIC | Age: 74
End: 2024-08-01
Payer: MEDICARE

## 2024-08-01 VITALS
HEART RATE: 91 BPM | SYSTOLIC BLOOD PRESSURE: 114 MMHG | WEIGHT: 263 LBS | HEIGHT: 68 IN | BODY MASS INDEX: 39.86 KG/M2 | DIASTOLIC BLOOD PRESSURE: 70 MMHG

## 2024-08-01 DIAGNOSIS — R00.2 PALPITATIONS: ICD-10-CM

## 2024-08-01 DIAGNOSIS — I10 ESSENTIAL HYPERTENSION: Primary | ICD-10-CM

## 2024-08-01 DIAGNOSIS — I25.10 CORONARY ARTERY DISEASE INVOLVING NATIVE CORONARY ARTERY OF NATIVE HEART WITHOUT ANGINA PECTORIS: ICD-10-CM

## 2024-08-01 PROCEDURE — 99214 OFFICE O/P EST MOD 30 MIN: CPT | Performed by: STUDENT IN AN ORGANIZED HEALTH CARE EDUCATION/TRAINING PROGRAM

## 2024-08-01 PROCEDURE — 1123F ACP DISCUSS/DSCN MKR DOCD: CPT | Performed by: STUDENT IN AN ORGANIZED HEALTH CARE EDUCATION/TRAINING PROGRAM

## 2024-08-01 PROCEDURE — 3074F SYST BP LT 130 MM HG: CPT | Performed by: STUDENT IN AN ORGANIZED HEALTH CARE EDUCATION/TRAINING PROGRAM

## 2024-08-01 PROCEDURE — 93000 ELECTROCARDIOGRAM COMPLETE: CPT | Performed by: STUDENT IN AN ORGANIZED HEALTH CARE EDUCATION/TRAINING PROGRAM

## 2024-08-01 PROCEDURE — 3078F DIAST BP <80 MM HG: CPT | Performed by: STUDENT IN AN ORGANIZED HEALTH CARE EDUCATION/TRAINING PROGRAM

## 2024-08-01 RX ORDER — GABAPENTIN 300 MG/1
300 CAPSULE ORAL 3 TIMES DAILY
COMMUNITY
Start: 2024-06-26

## 2024-08-01 RX ORDER — TIRZEPATIDE 7.5 MG/.5ML
INJECTION, SOLUTION SUBCUTANEOUS
COMMUNITY
Start: 2024-05-30

## 2024-08-01 NOTE — PROGRESS NOTES
[facility] [place] [facility]  [facility] CARDIOLOGY  [address]  [address]  [facility] [address]  Dept: [phone]  Dept Fax: [phone]  Loc: [phone]    Visit Date: 8/1/2024    Nargis Chan is a 74 y.o. female who presents todayfor:  Chief Complaint   Patient presents with    Follow-up     BP has been up and down     Dizziness     Cardiologist: Emily Washington of mild CAD, HTN     HPI: f/u for BP concerns  Having significant BP swings up and down. Gets very dizzy with standing up or getting out of bed over the last month. Her BP at baseline seems very steady and mostly normal. Couple higher BP at times though. No cp or sob. No swelling or orthopnea.     Past Surgical History:   Procedure Laterality Date    BUNIONECTOMY Right 1980's    CARPAL TUNNEL RELEASE Bilateral 1990 2008 1991 2016     x2 right x2 left    [facility]OS[facility]  2017    [facility]    GASTRIC FUNDOPLICATION N/A 12/05/2018    ROBOTIC HIATAL HERNIA WITH NISSEN FUNDOPLICATION performed by Ester Livingston MD at [facility] OR    HERNIA REPAIR      HIP ARTHROPLASTY Left 02/2015    HIP SURGERY      HYSTERECTOMY (CERVIX STATUS UNKNOWN)  1980    JOINT REPLACEMENT Right     KNEE ARTHROPLASTY Left 2007    KNEE ARTHROPLASTY Right 2014    OVARY REMOVAL      UPPER GASTROINTESTINAL ENDOSCOPY      UPPER GASTROINTESTINAL ENDOSCOPY N/A 11/13/2018    EGD DIAGNOSTIC ONLY performed by Linda Carey MD at [facility] Endoscopy    UPPER GASTROINTESTINAL ENDOSCOPY N/A 09/09/2021    EGD FOREIGN BODY REMOVAL performed by Marceilno Justice MD at [facility] Endoscopy    VEIN SURGERY Bilateral 2019     Family History   Problem Relation Age of Onset    Arthritis Mother     High Blood Pressure Mother     Cancer Mother     Obesity Mother     Ovarian Cancer Sister 50    Arthritis Maternal Grandmother     Breast Cancer Maternal Grandmother 77    Cancer Maternal Grandfather         colon    Arthritis Maternal Grandfather      Social History     Tobacco Use    Smoking

## 2024-08-01 NOTE — PROGRESS NOTES
Reports dizziness and heart palpitations    Reports BP has been up and down for the last month.   Reports at times will have cold chills and it will pass quickly.      EKG completed today in office.       At her PCP office on 07/20/2024             171/57 sitting             71/47 standing             83/51 standing after 2 minutes             128/68 sitting

## 2024-08-01 NOTE — PATIENT INSTRUCTIONS
Hold atenolol. Try compression hose while up during the day.     Monitor blood pressure daily or if feeling bad.

## 2024-08-26 ENCOUNTER — OFFICE VISIT (OUTPATIENT)
Dept: CARDIOLOGY CLINIC | Age: 74
End: 2024-08-26
Payer: MEDICARE

## 2024-08-26 VITALS
SYSTOLIC BLOOD PRESSURE: 124 MMHG | DIASTOLIC BLOOD PRESSURE: 62 MMHG | HEIGHT: 68 IN | HEART RATE: 86 BPM | BODY MASS INDEX: 39.71 KG/M2 | WEIGHT: 262 LBS

## 2024-08-26 DIAGNOSIS — I25.10 CORONARY ARTERY DISEASE INVOLVING NATIVE CORONARY ARTERY OF NATIVE HEART WITHOUT ANGINA PECTORIS: ICD-10-CM

## 2024-08-26 DIAGNOSIS — R42 DIZZINESS: ICD-10-CM

## 2024-08-26 DIAGNOSIS — I10 PRIMARY HYPERTENSION: Primary | ICD-10-CM

## 2024-08-26 PROCEDURE — 99214 OFFICE O/P EST MOD 30 MIN: CPT | Performed by: NUCLEAR MEDICINE

## 2024-08-26 PROCEDURE — 1123F ACP DISCUSS/DSCN MKR DOCD: CPT | Performed by: NUCLEAR MEDICINE

## 2024-08-26 PROCEDURE — 3078F DIAST BP <80 MM HG: CPT | Performed by: NUCLEAR MEDICINE

## 2024-08-26 PROCEDURE — 3074F SYST BP LT 130 MM HG: CPT | Performed by: NUCLEAR MEDICINE

## 2024-08-26 NOTE — PROGRESS NOTES
[facility] [place] [facility]  [facility] CARDIOLOGY  [address]  [address]  [facility] [address]  Dept: [phone]  Dept Fax: [phone]  Loc: [phone]    Visit Date: 8/26/2024    Nargis Chan is a 74 y.o. female who presents todayfor:  Chief Complaint   Patient presents with    Check-Up    Hypertension    Coronary Artery Disease    Hyperlipidemia    Shortness of Breath   Having dizziness  Intermittent in nature  Near syncope  Meds adjusted   Not much better  Higher BP at times  No obvious too low numbers  Known mild CAD  No chest pain  Some baseline dyspnea  On statins for hyperlipidemia        HPI:  HPI  Past Medical History:   Diagnosis Date    Abdominal hernia     Cancer (HCC)     skin    SCOTT (dyspnea on exertion)     false positive stress test 2014 with normal cardiac cath 2014.    GERD (gastroesophageal reflux disease)     Headache     Migraines    Hyperglycemia 2015    borderline takes Metformin    Hyperlipidemia     Migraine headache     Morbid obesity (HCC)     Osteopenia     RLS (restless legs syndrome)     Type 1 diabetes mellitus (HCC)     UTI (urinary tract infection)     history of      Past Surgical History:   Procedure Laterality Date    BUNIONECTOMY Right 1980's    CARPAL TUNNEL RELEASE Bilateral 1990 2008 1991 2016     x2 right x2 left    COLONOSCOPY  2017    [facility]    GASTRIC FUNDOPLICATION N/A 12/05/2018    ROBOTIC HIATAL HERNIA WITH NISSEN FUNDOPLICATION performed by Ester Livingston MD at [facility] OR    HERNIA REPAIR      HIP ARTHROPLASTY Left 02/2015    HIP SURGERY      HYSTERECTOMY (CERVIX STATUS UNKNOWN)  1980    JOINT REPLACEMENT Right     KNEE ARTHROPLASTY Left 2007    KNEE ARTHROPLASTY Right 2014    OVARY REMOVAL      UPPER GASTROINTESTINAL ENDOSCOPY      UPPER GASTROINTESTINAL ENDOSCOPY N/A 11/13/2018    EGD DIAGNOSTIC ONLY performed by Linda Carey MD at [facility]    UPPER GASTROINTESTINAL ENDOSCOPY N/A 09/09/2021    EGD FOREIGN BODY  symptoms  ?/ other causes  ?/ carotid disease  Cardiac seems stable    Plan:  No follow-ups on file.  As above  Support hose  Carotid US  Echo   Continue risk factor modification and medical management  Thank you for allowing me to participate in the care of your patient. Please don't hesitate to contact me regarding any further issues related to the patient care    Orders Placed:  No orders of the defined types were placed in this encounter.      Prescribed:  No orders of the defined types were placed in this encounter.         Discussed use, benefit, and side effects of prescribed medications. All patient questions answered. Pt voicedunderstanding. Instructed to continue current medications, diet and exercise. Continue risk factor modification and medical management. Patient agreed with treatment plan. Follow up as directed.    Electronically signedby Shawn Rizvi MD on 8/26/2024 at 11:43 AM

## 2024-09-03 ENCOUNTER — HOSPITAL ENCOUNTER (OUTPATIENT)
Dept: INTERVENTIONAL RADIOLOGY/VASCULAR | Age: 74
Discharge: HOME | End: 2024-09-05
Attending: NUCLEAR MEDICINE
Payer: MEDICARE

## 2024-09-03 ENCOUNTER — HOSPITAL ENCOUNTER (OUTPATIENT)
Age: 74
Discharge: HOME | End: 2024-09-05
Attending: NUCLEAR MEDICINE
Payer: MEDICARE

## 2024-09-03 VITALS
WEIGHT: 262 LBS | BODY MASS INDEX: 39.71 KG/M2 | HEIGHT: 68 IN | DIASTOLIC BLOOD PRESSURE: 62 MMHG | SYSTOLIC BLOOD PRESSURE: 124 MMHG

## 2024-09-03 DIAGNOSIS — R42 DIZZINESS: ICD-10-CM

## 2024-09-03 DIAGNOSIS — I25.10 CORONARY ARTERY DISEASE INVOLVING NATIVE CORONARY ARTERY OF NATIVE HEART WITHOUT ANGINA PECTORIS: ICD-10-CM

## 2024-09-03 DIAGNOSIS — I10 PRIMARY HYPERTENSION: ICD-10-CM

## 2024-09-03 LAB
ECHO AO ASC DIAM: 3.1 CM
ECHO AO ASCENDING AORTA INDEX: 1.35 CM/M2
ECHO AV CUSP MM: 1.6 CM
ECHO AV PEAK GRADIENT: 10 MMHG
ECHO AV PEAK VELOCITY: 1.6 M/S
ECHO AV VELOCITY RATIO: 0.75
ECHO BSA: 2.39 M2
ECHO LA AREA 2C: 19.3 CM2
ECHO LA AREA 4C: 20.5 CM2
ECHO LA DIAMETER INDEX: 2.05 CM/M2
ECHO LA DIAMETER: 4.7 CM
ECHO LA MAJOR AXIS: 5.9 CM
ECHO LA MINOR AXIS: 5.4 CM
ECHO LA VOL BP: 59 ML (ref 22–52)
ECHO LA VOL MOD A2C: 56 ML (ref 22–52)
ECHO LA VOL MOD A4C: 58 ML (ref 22–52)
ECHO LA VOL/BSA BIPLANE: 26 ML/M2 (ref 16–34)
ECHO LA VOLUME INDEX MOD A2C: 24 ML/M2 (ref 16–34)
ECHO LA VOLUME INDEX MOD A4C: 25 ML/M2 (ref 16–34)
ECHO LV E' LATERAL VELOCITY: 5 CM/S
ECHO LV E' SEPTAL VELOCITY: 5 CM/S
ECHO LV EJECTION FRACTION BIPLANE: 68 % (ref 55–100)
ECHO LV FRACTIONAL SHORTENING: 38 % (ref 28–44)
ECHO LV INTERNAL DIMENSION DIASTOLE INDEX: 2.27 CM/M2
ECHO LV INTERNAL DIMENSION DIASTOLIC: 5.2 CM (ref 3.9–5.3)
ECHO LV INTERNAL DIMENSION SYSTOLIC INDEX: 1.4 CM/M2
ECHO LV INTERNAL DIMENSION SYSTOLIC: 3.2 CM
ECHO LV ISOVOLUMETRIC RELAXATION TIME (IVRT): 92 MS
ECHO LV IVSD: 0.9 CM (ref 0.6–0.9)
ECHO LV MASS 2D: 156.9 G (ref 67–162)
ECHO LV MASS INDEX 2D: 68.5 G/M2 (ref 43–95)
ECHO LV POSTERIOR WALL DIASTOLIC: 0.8 CM (ref 0.6–0.9)
ECHO LV RELATIVE WALL THICKNESS RATIO: 0.31
ECHO LVOT PEAK GRADIENT: 5 MMHG
ECHO LVOT PEAK VELOCITY: 1.2 M/S
ECHO MV A VELOCITY: 1.08 M/S
ECHO MV E DECELERATION TIME (DT): 215 MS
ECHO MV E VELOCITY: 0.68 M/S
ECHO MV E/A RATIO: 0.63
ECHO MV E/E' LATERAL: 13.6
ECHO MV E/E' RATIO (AVERAGED): 13.6
ECHO MV E/E' SEPTAL: 13.6
ECHO PV MAX VELOCITY: 0.4 M/S
ECHO PV PEAK GRADIENT: 1 MMHG
ECHO RV INTERNAL DIMENSION: 4.3 CM
ECHO RV TAPSE: 2.2 CM (ref 1.7–?)
ECHO TV E WAVE: 0.4 M/S
ECHO TV REGURGITANT MAX VELOCITY: 2.43 M/S
ECHO TV REGURGITANT PEAK GRADIENT: 24 MMHG

## 2024-09-03 PROCEDURE — 93306 TTE W/DOPPLER COMPLETE: CPT

## 2024-09-03 PROCEDURE — 93306 TTE W/DOPPLER COMPLETE: CPT | Performed by: NUCLEAR MEDICINE

## 2024-09-03 PROCEDURE — 93880 EXTRACRANIAL BILAT STUDY: CPT

## 2024-09-04 ENCOUNTER — TELEPHONE (OUTPATIENT)
Dept: CARDIOLOGY CLINIC | Age: 74
End: 2024-09-04

## 2024-09-04 DIAGNOSIS — I31.39 PERICARDIAL EFFUSION: Primary | ICD-10-CM

## 2024-11-04 ENCOUNTER — HOSPITAL ENCOUNTER (OUTPATIENT)
Age: 74
Discharge: HOME | End: 2024-11-06
Attending: NUCLEAR MEDICINE
Payer: MEDICARE

## 2024-11-04 DIAGNOSIS — I31.39 PERICARDIAL EFFUSION: ICD-10-CM

## 2024-11-04 LAB
ECHO AO ASC DIAM: 3.1 CM
ECHO AV CUSP MM: 1.6 CM
ECHO LA AREA 2C: 19 CM2
ECHO LA AREA 4C: 22.8 CM2
ECHO LA DIAMETER: 4.6 CM
ECHO LA MAJOR AXIS: 5.8 CM
ECHO LA MINOR AXIS: 5.6 CM
ECHO LA VOL BP: 63 ML (ref 22–52)
ECHO LA VOL MOD A2C: 53 ML (ref 22–52)
ECHO LA VOL MOD A4C: 73 ML (ref 22–52)
ECHO LV EDV A2C: 64 ML
ECHO LV EDV A4C: 73 ML
ECHO LV EJECTION FRACTION A2C: 61 %
ECHO LV EJECTION FRACTION A4C: 56 %
ECHO LV EJECTION FRACTION BIPLANE: 57 % (ref 55–100)
ECHO LV ESV A2C: 25 ML
ECHO LV ESV A4C: 32 ML
ECHO LV FRACTIONAL SHORTENING: 32 % (ref 28–44)
ECHO LV INTERNAL DIMENSION DIASTOLIC: 4.4 CM (ref 3.9–5.3)
ECHO LV INTERNAL DIMENSION SYSTOLIC: 3 CM
ECHO LV IVSD: 1 CM (ref 0.6–0.9)
ECHO LV MASS 2D: 147.8 G (ref 67–162)
ECHO LV POSTERIOR WALL DIASTOLIC: 1 CM (ref 0.6–0.9)
ECHO LV RELATIVE WALL THICKNESS RATIO: 0.45
ECHO RV INTERNAL DIMENSION: 3.4 CM
ECHO RV TAPSE: 1.7 CM (ref 1.7–?)

## 2024-11-04 PROCEDURE — 93306 TTE W/DOPPLER COMPLETE: CPT

## 2024-11-04 PROCEDURE — 93307 TTE W/O DOPPLER COMPLETE: CPT | Performed by: NUCLEAR MEDICINE

## 2025-02-18 RX ORDER — ATORVASTATIN CALCIUM 10 MG/1
10 TABLET, FILM COATED ORAL DAILY
Qty: 90 TABLET | Refills: 1 | Status: SHIPPED | OUTPATIENT
Start: 2025-02-18

## 2025-02-18 NOTE — TELEPHONE ENCOUNTER
Nargis Chan called requesting a refill on the following medications:  Requested Prescriptions     Pending Prescriptions Disp Refills    atorvastatin (LIPITOR) 10 MG tablet 30 tablet      Sig: Take 1 tablet by mouth daily     Pharmacy verified:[facility] [place] [phone]  .pv      Date of last visit: 08.26.2024  Date of next visit (if applicable): 07.25.2025

## 2025-03-13 ENCOUNTER — TELEPHONE (OUTPATIENT)
Dept: CARDIOLOGY CLINIC | Age: 75
End: 2025-03-13

## 2025-03-13 NOTE — TELEPHONE ENCOUNTER
Spoke with patient, I offered appt with Kailash or pankaj, she does not want, she said that her pcp will just go ahead and send her to [facility] as a new patient.

## 2025-03-13 NOTE — TELEPHONE ENCOUNTER
Patient is  requesting a appt with Dr. Rizvi. She says she has blacked out 3xs and her PCP (Mckenzie He)is wanting her seen for possibly needing a pacemaker. Please contact patient to schedule

## 2025-06-13 ENCOUNTER — APPOINTMENT (OUTPATIENT)
Dept: CT IMAGING | Age: 75
End: 2025-06-13
Payer: MEDICARE

## 2025-06-13 ENCOUNTER — HOSPITAL ENCOUNTER (EMERGENCY)
Age: 75
Discharge: HOME | End: 2025-06-13
Attending: STUDENT IN AN ORGANIZED HEALTH CARE EDUCATION/TRAINING PROGRAM
Payer: MEDICARE

## 2025-06-13 VITALS
OXYGEN SATURATION: 97 % | DIASTOLIC BLOOD PRESSURE: 84 MMHG | TEMPERATURE: 97.9 F | SYSTOLIC BLOOD PRESSURE: 132 MMHG | HEART RATE: 74 BPM | RESPIRATION RATE: 18 BRPM

## 2025-06-13 DIAGNOSIS — N93.9 VAGINAL BLEEDING: ICD-10-CM

## 2025-06-13 DIAGNOSIS — N81.9 FEMALE GENITAL PROLAPSE, UNSPECIFIED TYPE: Primary | ICD-10-CM

## 2025-06-13 LAB
ALBUMIN SERPL BCG-MCNC: 3.7 G/DL (ref 3.4–4.9)
ALP SERPL-CCNC: 71 U/L (ref 38–126)
ALT SERPL W/O P-5'-P-CCNC: 6 U/L (ref 10–35)
AMORPH SED URNS QL MICRO: ABNORMAL
ANION GAP SERPL CALC-SCNC: 11 MEQ/L (ref 8–16)
AST SERPL-CCNC: 14 U/L (ref 10–35)
BACTERIA URNS QL MICRO: ABNORMAL /HPF
BASOPHILS ABSOLUTE: 0 THOU/MM3 (ref 0–0.1)
BASOPHILS NFR BLD AUTO: 0.8 %
BILIRUB SERPL-MCNC: 0.8 MG/DL (ref 0.3–1.2)
BILIRUB UR QL STRIP.AUTO: NEGATIVE
BUN SERPL-MCNC: 9 MG/DL (ref 8–23)
CALCIUM SERPL-MCNC: 9.4 MG/DL (ref 8.8–10.2)
CASTS #/AREA URNS LPF: ABNORMAL /LPF
CASTS 2: ABNORMAL /LPF
CHARACTER UR: ABNORMAL
CHLORIDE SERPL-SCNC: 105 MEQ/L (ref 98–111)
CO2 SERPL-SCNC: 27 MEQ/L (ref 22–29)
COLOR, UA: YELLOW
CREAT SERPL-MCNC: 0.8 MG/DL (ref 0.5–0.9)
CRYSTALS URNS MICRO: ABNORMAL
DEPRECATED RDW RBC AUTO: 42.3 FL (ref 35–45)
EKG ATRIAL RATE: 76 BPM
EKG P AXIS: 55 DEGREES
EKG P-R INTERVAL: 140 MS
EKG Q-T INTERVAL: 454 MS
EKG QRS DURATION: 146 MS
EKG QTC CALCULATION (BAZETT): 510 MS
EKG R AXIS: -19 DEGREES
EKG T AXIS: 10 DEGREES
EKG VENTRICULAR RATE: 76 BPM
EOSINOPHIL NFR BLD AUTO: 4.5 %
EOSINOPHILS ABSOLUTE: 0.2 THOU/MM3 (ref 0–0.4)
EPITHELIAL CELLS, UA: ABNORMAL /HPF
ERYTHROCYTE [DISTWIDTH] IN BLOOD BY AUTOMATED COUNT: 12.6 % (ref 11.5–14.5)
GFR SERPL CREATININE-BSD FRML MDRD: 77 ML/MIN/1.73M2
GLUCOSE SERPL-MCNC: 104 MG/DL (ref 74–109)
GLUCOSE UR QL STRIP.AUTO: NEGATIVE MG/DL
HCT VFR BLD AUTO: 39.8 % (ref 37–47)
HGB BLD-MCNC: 13.1 GM/DL (ref 12–16)
HGB UR QL STRIP.AUTO: ABNORMAL
IMM GRANULOCYTES # BLD AUTO: 0.01 THOU/MM3 (ref 0–0.07)
IMM GRANULOCYTES NFR BLD AUTO: 0.2 %
KETONES UR QL STRIP.AUTO: ABNORMAL
LACTIC ACID, SEPSIS: 1 MMOL/L (ref 0.5–1.9)
LACTIC ACID, SEPSIS: 1.1 MMOL/L (ref 0.5–1.9)
LIPASE SERPL-CCNC: 25 U/L (ref 13–60)
LYMPHOCYTES ABSOLUTE: 1.5 THOU/MM3 (ref 1–4.8)
LYMPHOCYTES NFR BLD AUTO: 28.8 %
MCH RBC QN AUTO: 30 PG (ref 26–33)
MCHC RBC AUTO-ENTMCNC: 32.9 GM/DL (ref 32.2–35.5)
MCV RBC AUTO: 91.3 FL (ref 81–99)
MISCELLANEOUS 2: ABNORMAL
MONOCYTES ABSOLUTE: 0.4 THOU/MM3 (ref 0.4–1.3)
MONOCYTES NFR BLD AUTO: 7.4 %
MUCOUS THREADS URNS QL MICRO: ABNORMAL
NEUTROPHILS ABSOLUTE: 3.1 THOU/MM3 (ref 1.8–7.7)
NEUTROPHILS NFR BLD AUTO: 58.3 %
NITRITE UR QL STRIP: NEGATIVE
NRBC BLD AUTO-RTO: 0 /100 WBC
OSMOLALITY SERPL CALC.SUM OF ELEC: 284 MOSMOL/KG (ref 275–300)
PH UR STRIP.AUTO: 6 [PH] (ref 5–9)
PLATELET # BLD AUTO: 252 THOU/MM3 (ref 130–400)
PMV BLD AUTO: 9.9 FL (ref 9.4–12.4)
POTASSIUM SERPL-SCNC: 3.4 MEQ/L (ref 3.5–5.2)
PROT SERPL-MCNC: 6.2 G/DL (ref 6.4–8.3)
PROT UR STRIP.AUTO-MCNC: 30 MG/DL
RBC # BLD AUTO: 4.36 MILL/MM3 (ref 4.2–5.4)
RBC URINE: ABNORMAL /HPF
RENAL EPI CELLS #/AREA URNS HPF: ABNORMAL /[HPF]
SODIUM SERPL-SCNC: 143 MEQ/L (ref 135–145)
SP GR UR REFRACT.AUTO: 1.02 (ref 1–1.03)
UROBILINOGEN, URINE: 1 EU/DL (ref 0–1)
WBC # BLD AUTO: 5.3 THOU/MM3 (ref 4.8–10.8)
WBC #/AREA URNS HPF: ABNORMAL /HPF
WBC #/AREA URNS HPF: ABNORMAL /[HPF]
YEAST LIKE FUNGI URNS QL MICRO: ABNORMAL

## 2025-06-13 PROCEDURE — 83690 ASSAY OF LIPASE: CPT

## 2025-06-13 PROCEDURE — 99285 EMERGENCY DEPT VISIT HI MDM: CPT

## 2025-06-13 PROCEDURE — 85025 COMPLETE CBC W/AUTO DIFF WBC: CPT

## 2025-06-13 PROCEDURE — 93010 ELECTROCARDIOGRAM REPORT: CPT | Performed by: INTERNAL MEDICINE

## 2025-06-13 PROCEDURE — 6360000004 HC RX CONTRAST MEDICATION: Performed by: STUDENT IN AN ORGANIZED HEALTH CARE EDUCATION/TRAINING PROGRAM

## 2025-06-13 PROCEDURE — 80053 COMPREHEN METABOLIC PANEL: CPT

## 2025-06-13 PROCEDURE — 74177 CT ABD & PELVIS W/CONTRAST: CPT

## 2025-06-13 PROCEDURE — 36415 COLL VENOUS BLD VENIPUNCTURE: CPT

## 2025-06-13 PROCEDURE — 87086 URINE CULTURE/COLONY COUNT: CPT

## 2025-06-13 PROCEDURE — 81001 URINALYSIS AUTO W/SCOPE: CPT

## 2025-06-13 PROCEDURE — 93005 ELECTROCARDIOGRAM TRACING: CPT

## 2025-06-13 PROCEDURE — 83605 ASSAY OF LACTIC ACID: CPT

## 2025-06-13 RX ORDER — IOPAMIDOL 755 MG/ML
80 INJECTION, SOLUTION INTRAVASCULAR
Status: COMPLETED | OUTPATIENT
Start: 2025-06-13 | End: 2025-06-13

## 2025-06-13 RX ADMIN — IOPAMIDOL 80 ML: 755 INJECTION, SOLUTION INTRAVENOUS at 12:20

## 2025-06-13 ASSESSMENT — PAIN SCALES - GENERAL: PAINLEVEL_OUTOF10: 0

## 2025-06-13 NOTE — DISCHARGE INSTRUCTIONS
You were seen in the ED for cervical prolapse and bleeding.  Your discharge instructions to follow-up outpatient with OB/GYN.  Contact information attached.  It is important to follow-up with them as soon as possible for further evaluation.  You were encouraged not to practice any sexual activity in the meantime until you see OB/GYN.  However, if you continue experiencing worsening bleeding with associated lightheadedness, dizziness, chest pain, shortness of breath prior to OB/GYN visit, do not hesitate to call 911 and/or return to the ED.    [facility]  [address]  [address]  [place] [address]  Office [phone]  Fax [phone]      [facility]  Office Hours  Mon, Thu7:00 am - 8:00 pm  Tue, Wed, Fri7:00 am - 4:00 pm  Sat - SunClosed  Closed for lunch from:  11:00 -12:00 daily and also  4:00 to 5:00 on Mon and Thursday  [facility] Location:  [address] [place] [address] [id number] [place] [address] OB/GYN  [address] [facility]  [address] [place] [address]  [phone]

## 2025-06-13 NOTE — ED TRIAGE NOTES
Patient presents with concerns of constipation. Patient states she used an enema a few days ago and was able to have a small bowel movement. States this is a chronic problem for her. States she has had increased issues since starting the monjoro shot. States she was straining hard the other day and felt a \"rip\" and states she is now bleeding. Denies abdominal cramping or pain. Point tenderness noted on abdominal palpation of the LUQ. States she does have a history of diverticulosis.

## 2025-06-13 NOTE — ED PROVIDER NOTES
Height Weight         -- --             Physical Exam  Vitals and nursing note reviewed. Exam conducted with a chaperone present.   Constitutional:       Appearance: Normal appearance.   HENT:      Head: Normocephalic and atraumatic.      Right Ear: External ear normal.      Left Ear: External ear normal.      Nose: Nose normal.      Mouth/Throat:      Mouth: Mucous membranes are moist.   Eyes:      Conjunctiva/sclera: Conjunctivae normal.   Cardiovascular:      Rate and Rhythm: Normal rate and regular rhythm.      Heart sounds: No murmur heard.  Pulmonary:      Effort: Pulmonary effort is normal.      Breath sounds: Normal breath sounds.   Abdominal:      Palpations: Abdomen is soft.      Tenderness: There is abdominal tenderness.      Comments: Left lower quadrant abdominal tenderness   Genitourinary:     Comments: Pelvic exam performed in the presence of a chaperone.  Low-lying cervix noted in the vaginal orifice concerning for cervical prolapse.  No noted atrophy or bleeding on the sidewalls of the vagina.  Cervix was palpated without reported pain.  This was pushed back with a speculum with noted bloody extravasation from the cervical os.  No purulent discharge  Musculoskeletal:      Cervical back: Normal range of motion.      Right lower leg: No edema.      Left lower leg: No edema.   Skin:     General: Skin is warm and dry.      Capillary Refill: Capillary refill takes less than 2 seconds.   Neurological:      General: No focal deficit present.      Mental Status: She is alert and oriented to person, place, and time.         DIAGNOSTIC RESULTS     EKG:(none if blank)  All [facility]'s are [facility] who either signs or Co-signs this chart in the absence of a cardiologist.      RADIOLOGY: (none if blank)   Interpretation per the Radiologist below, if available at the time of this note:    CT ABDOMEN PELVIS W IV CONTRAST Additional Contrast? None   Final Result   1. No acute  administered this visit:    Medications   iopamidol (ISOVUE-370) 76 % injection 80 mL (80 mLs IntraVENous Given 6/13/25 1220)         Procedures: (None if blank)    Differential Diagnosis:  Considering the patient's presenting symptoms and physical examination findings, it was imperative to evaluate and/or consider multiple emergent medical conditions, such as cervical cancer, ovarian cancer, atrophic vaginitis, fistula, genitalia prolapse    Although some of these diagnoses may be less likely, they were factored into the medical decision-making process to ensure thorough assessment and appropriate management.      Plan:   Imaging  Labs  Pelvic exam  Outpatient follow-up           CLINICAL       1. Female genital prolapse, unspecified type    2. Vaginal bleeding          DISPOSITION/PLAN   DISPOSITION Decision To Discharge 06/13/2025 02:12:14 PM   DISPOSITION CONDITION Stable           PATIENT REFERRED TO:  Mckenzie Garrison APRN - NP  [address] [facility] [address]  [phone]    In 3 days        DISCHARGE MEDICATIONS:  Discharge Medication List as of 6/13/2025  2:12 PM                 (Please note that portions of this note were completed with a voice recognition program.  Efforts were made to edit the dictations but occasionally words are mis-transcribed.)

## 2025-06-14 LAB
BACTERIA UR CULT: ABNORMAL
ORGANISM: ABNORMAL

## 2025-07-18 ENCOUNTER — OFFICE VISIT (OUTPATIENT)
Dept: UROLOGY | Age: 75
End: 2025-07-18
Payer: MEDICARE

## 2025-07-18 VITALS — HEIGHT: 68 IN | TEMPERATURE: 98.2 F | BODY MASS INDEX: 39.25 KG/M2 | RESPIRATION RATE: 20 BRPM | WEIGHT: 259 LBS

## 2025-07-18 DIAGNOSIS — N36.2 URETHRAL POLYP: Primary | ICD-10-CM

## 2025-07-18 PROCEDURE — 1159F MED LIST DOCD IN RCRD: CPT | Performed by: UROLOGY

## 2025-07-18 PROCEDURE — 1123F ACP DISCUSS/DSCN MKR DOCD: CPT | Performed by: UROLOGY

## 2025-07-18 PROCEDURE — 99204 OFFICE O/P NEW MOD 45 MIN: CPT | Performed by: UROLOGY

## 2025-07-18 RX ORDER — IBUPROFEN 600 MG/1
600 TABLET, FILM COATED ORAL EVERY 8 HOURS PRN
COMMUNITY

## 2025-07-18 RX ORDER — HYDROCODONE BITARTRATE AND ACETAMINOPHEN 10; 325 MG/1; MG/1
TABLET ORAL
COMMUNITY

## 2025-07-18 RX ORDER — ESTRADIOL 0.1 MG/G
2 CREAM VAGINAL SEE ADMIN INSTRUCTIONS
Qty: 42.5 G | Refills: 5 | Status: SHIPPED | OUTPATIENT
Start: 2025-07-18

## 2025-07-18 RX ORDER — DICYCLOMINE HYDROCHLORIDE 10 MG/1
CAPSULE ORAL
COMMUNITY
Start: 2025-05-22

## 2025-07-18 RX ORDER — FLUDROCORTISONE ACETATE 0.1 MG/1
0.1 TABLET ORAL DAILY
COMMUNITY
Start: 2025-06-24

## 2025-07-18 NOTE — PROGRESS NOTES
Dr. Celestino Alexander Jr, MD MD  [facility] [place] [facility] Consultation / New Patient Visit    Patient:  Nargis Chan  YOB: 1950  Date: 7/18/2025  Consult requested from Mckenzie Garrison APRN - NP     HISTORY OF PRESENT ILLNESS:   The patient is a 75 y.o. female who presents today for follow-up for the following problem(s): urethral polyp  Overall the problem(s) : are worsening.  Associated Symptoms: No dysuria, gross hematuria.   Pain Severity:      Today visit:   7/18/25       Summary of old records:   (Patient's old records, notes and chart reviewed and summarized above.)    Urinalysis today:  No results found for this visit on 07/18/25.    Last BUN and creatinine:  Lab Results   Component Value Date    BUN 9 06/13/2025     Lab Results   Component Value Date    CREATININE 0.8 06/13/2025       Imaging Reviewed during this Office Visit:   (results were independently reviewed by physician and radiology report verified)    PAST MEDICAL, FAMILY AND SOCIAL HISTORY:  Past Medical History:   Diagnosis Date    Abdominal hernia     Cancer (HCC)     skin    SCOTT (dyspnea on exertion)     false positive stress test 2014 with normal cardiac cath 2014.    GERD (gastroesophageal reflux disease)     Headache     Migraines    Hyperglycemia 2015    borderline takes Metformin    Hyperlipidemia     Migraine headache     Morbid obesity (HCC)     Osteopenia     RLS (restless legs syndrome)     UTI (urinary tract infection)     history of     Past Surgical History:   Procedure Laterality Date    BUNIONECTOMY Right 1980's    CARPAL TUNNEL RELEASE Bilateral 1990 2008 1991 2016     x2 right x2 left    COLONOSCOPY  2017    [facility]    GASTRIC FUNDOPLICATION N/A 12/05/2018    ROBOTIC HIATAL HERNIA WITH NISSEN FUNDOPLICATION performed by Ester Livingston MD at [facility] OR    HERNIA REPAIR      HIP ARTHROPLASTY Left 02/2015    HIP SURGERY      HYSTERECTOMY (CERVIX STATUS UNKNOWN)  1980    JOINT REPLACEMENT Right     KNEE

## 2025-07-25 ENCOUNTER — TELEPHONE (OUTPATIENT)
Dept: UROLOGY | Age: 75
End: 2025-07-25

## 2025-08-01 RX ORDER — ATORVASTATIN CALCIUM 10 MG/1
10 TABLET, FILM COATED ORAL DAILY
Qty: 90 TABLET | Refills: 1 | Status: SHIPPED | OUTPATIENT
Start: 2025-08-01

## 2025-08-25 ENCOUNTER — HOSPITAL ENCOUNTER (OUTPATIENT)
Dept: UROLOGY | Age: 75
Discharge: HOME | End: 2025-08-25
Payer: MEDICARE

## 2025-08-25 VITALS
OXYGEN SATURATION: 97 % | HEART RATE: 79 BPM | BODY MASS INDEX: 35.33 KG/M2 | SYSTOLIC BLOOD PRESSURE: 122 MMHG | HEIGHT: 68 IN | TEMPERATURE: 98.1 F | DIASTOLIC BLOOD PRESSURE: 82 MMHG | WEIGHT: 233.1 LBS | RESPIRATION RATE: 16 BRPM

## 2025-08-25 LAB
BILIRUBIN, URINE: NEGATIVE
BLOOD URINE, POC: NEGATIVE
CHARACTER, URINE: ABNORMAL
COLOR, UA: ABNORMAL
GLUCOSE URINE: NEGATIVE MG/DL
KETONES, URINE: NEGATIVE
LEUKOCYTE CLUMPS, URINE: NEGATIVE
NITRITE, URINE: NEGATIVE
PH, URINE: 6 (ref 5–9)
PROTEIN, URINE: 30 MG/DL
SPECIFIC GRAVITY UA: 1.02 (ref 1–1.03)
UROBILINOGEN, URINE: 2 EU/DL (ref 0–1)

## 2025-08-25 PROCEDURE — 2720000010 HC SURG SUPPLY STERILE

## 2025-08-25 PROCEDURE — 52000 CYSTOURETHROSCOPY: CPT

## 2025-08-25 RX ORDER — DOXYCYCLINE HYCLATE 100 MG
100 TABLET ORAL 2 TIMES DAILY
Qty: 6 TABLET | Refills: 0 | Status: SHIPPED | OUTPATIENT
Start: 2025-08-25 | End: 2025-08-28

## (undated) DEVICE — SUTURE VCRL SZ 0 L18IN ABSRB VLT SUTUPAK PRECUT W/O NDL J106T

## (undated) DEVICE — Device

## (undated) DEVICE — BLADELESS OBTURATOR: Brand: WECK VISTA

## (undated) DEVICE — GOWN,SIRUS,NONRNF,SETINSLV,XL,20/CS: Brand: MEDLINE

## (undated) DEVICE — BIOGUARD A/W CLEANING ADAPTER

## (undated) DEVICE — TOTAL TRAY, DB, 100% SILI FOLEY, 16FR 10: Brand: MEDLINE

## (undated) DEVICE — GLOVE ORTHO 8   MSG9480

## (undated) DEVICE — TROCAR ENDOSCP L100MM DIA5MM BLDELSS STBL SL THRD OPT VW

## (undated) DEVICE — JELLY,LUBE,STERILE,FLIP TOP,TUBE,2-OZ: Brand: MEDLINE

## (undated) DEVICE — LINER SUCT CANSTR 1500CC SEMI RIG W/ POR HYDROPHOBIC SHUT

## (undated) DEVICE — COLUMN DRAPE

## (undated) DEVICE — SOLUTION IV 1000ML 0.9% SOD CHL PH 5 INJ USP VIAFLX PLAS

## (undated) DEVICE — NEEDLE INSUF L120MM DIA2MM DISP FOR PNEUMOPERI ENDOPATH

## (undated) DEVICE — TROCAR ENDOSCP BLDELSS 12X100 MM W/ HNDL STBL SL OPT TIP

## (undated) DEVICE — TETRA-FLEX CF WOVEN LATEX FREE ELASTIC BANDAGE 6" X 5.5 YD: Brand: TETRA-FLEX™CF

## (undated) DEVICE — CANNULA SEAL

## (undated) DEVICE — ELECTRO LUBE IS A SINGLE PATIENT USE DEVICE THAT IS INTENDED TO BE USED ON ELECTROSURGICAL ELECTRODES TO REDUCE STICKING.: Brand: KEY SURGICAL ELECTRO LUBE

## (undated) DEVICE — BASIC SINGLE BASIN BTC-LF: Brand: MEDLINE INDUSTRIES, INC.

## (undated) DEVICE — GLOVE ORANGE PI 7   MSG9070

## (undated) DEVICE — BLADE LARYNSCP SZ 4 ENH DIR INTUB GLIDESCOPE MCGRATH MAC

## (undated) DEVICE — SOLUTION ANTIFOG VIS SYS CLEARIFY LAPSCP

## (undated) DEVICE — TUBING FLTR PLUME AWAY EVAC W/ SUCT DEV DISP PUREVIEW

## (undated) DEVICE — SKIN AFFIX SURG ADHESIVE 72/CS 0.55ML: Brand: MEDLINE

## (undated) DEVICE — VESSEL SEALER EXTEND: Brand: ENDOWRIST

## (undated) DEVICE — [HIGH FLOW INSUFFLATOR,  DO NOT USE IF PACKAGE IS DAMAGED,  KEEP DRY,  KEEP AWAY FROM SUNLIGHT,  PROTECT FROM HEAT AND RADIOACTIVE SOURCES.]: Brand: PNEUMOSURE

## (undated) DEVICE — GOWN,SIRUS,NON REINFRCD,LARGE,SET IN SL: Brand: MEDLINE

## (undated) DEVICE — COVER ARMBRD W13XL28.5IN IMPERV BLU FOR OP RM

## (undated) DEVICE — SUTURE V-LOC 180 SZ 2-0 L9IN ABSRB VLT GS-21 L37MM 1/2 CIR VLOCM0345

## (undated) DEVICE — 3M™ WARMING BLANKET, UPPER BODY, 10 PER CASE, 42268: Brand: BAIR HUGGER™

## (undated) DEVICE — TIP APPL L35CM RIG FOR SEAL EVICEL

## (undated) DEVICE — ARM DRAPE

## (undated) DEVICE — PENROSE DRAIN 18 X .5" SILICONE: Brand: MEDLINE

## (undated) DEVICE — GENERAL LAPAROSCOPY PACK-LF: Brand: MEDLINE INDUSTRIES, INC.

## (undated) DEVICE — PUMP SUC IRR TBNG L10FT W/ HNDPC ASSEMB STRYKEFLOW 2

## (undated) DEVICE — YANKAUER,BULB TIP,W/O VENT,RIGID,STERILE: Brand: MEDLINE

## (undated) DEVICE — TIP COVER ACCESSORY

## (undated) DEVICE — 35 ML SYRINGE LUER-LOCK TIP: Brand: MONOJECT